# Patient Record
Sex: FEMALE | Race: WHITE | NOT HISPANIC OR LATINO | Employment: OTHER | URBAN - METROPOLITAN AREA
[De-identification: names, ages, dates, MRNs, and addresses within clinical notes are randomized per-mention and may not be internally consistent; named-entity substitution may affect disease eponyms.]

---

## 2017-01-17 ENCOUNTER — TELEPHONE (OUTPATIENT)
Dept: PEDIATRICS | Facility: CLINIC | Age: 47
End: 2017-01-17

## 2017-01-17 DIAGNOSIS — N39.46 MIXED INCONTINENCE URGE AND STRESS (MALE)(FEMALE): Primary | ICD-10-CM

## 2017-01-18 NOTE — TELEPHONE ENCOUNTER
Received notice from pharmacy that medication is not covered.     Medication: Tolterodine 4MG  Insurance phone # : [phone]  Insurance ID: [id number]    Elena Malone MA

## 2017-01-20 NOTE — TELEPHONE ENCOUNTER
PA sent via Cover My Meds for Tolterodine ER 4 mg qd. Will await response.       lv loco (Jensen: [id number])  Tolterodine Tartrate ER 4MG er capsules  Status: Sent to Plan  Created: January 20th, 2017  Sent: January 20th, 2017

## 2017-01-21 ENCOUNTER — MYC REFILL (OUTPATIENT)
Dept: PEDIATRICS | Facility: CLINIC | Age: 47
End: 2017-01-21

## 2017-01-21 ENCOUNTER — MYC MEDICAL ADVICE (OUTPATIENT)
Dept: PEDIATRICS | Facility: CLINIC | Age: 47
End: 2017-01-21

## 2017-01-21 DIAGNOSIS — G47.33 OSA (OBSTRUCTIVE SLEEP APNEA): ICD-10-CM

## 2017-01-23 NOTE — TELEPHONE ENCOUNTER
Message from [employer]:  Original authorizing provider: MD Leti Mack would like a refill of the following medications:  order for DME [Mary Ellen Wise MD]    Preferred pharmacy: [facility]    Comment:

## 2017-01-23 NOTE — TELEPHONE ENCOUNTER
Patient sent a separate message through [employer] Refill request for this DME. See other encounter.

## 2017-01-23 NOTE — TELEPHONE ENCOUNTER
Patient sent another [employer] message asking for CPAP supplies.     Faxed DME to [facility]. Updated patient.

## 2017-01-26 NOTE — TELEPHONE ENCOUNTER
Received denial as patient needs to try formulary alternatives; Oxybutynin, Oxybutynin ER, Trospium, or Over the counter generic oxytrol for women.     Routing to provider for review.     Elena Malone MA

## 2017-01-27 RX ORDER — OXYBUTYNIN CHLORIDE 10 MG/1
10 TABLET, EXTENDED RELEASE ORAL DAILY
Qty: 90 TABLET | Refills: 3 | Status: SHIPPED | OUTPATIENT
Start: 2017-01-27 | End: 2018-02-22

## 2017-01-27 RX ORDER — OXYBUTYNIN CHLORIDE 5 MG/1
TABLET, EXTENDED RELEASE ORAL
Qty: 70 TABLET | Refills: 0 | Status: CANCELLED | OUTPATIENT
Start: 2017-01-27

## 2017-01-27 NOTE — TELEPHONE ENCOUNTER
Patient states she is ok with trying Oxybutynin. She would like a dose comparable to a higher dose of Detrol LA 4 mg, to better manage her symptoms, was still having some incontinence with taking 4mg.     Routing to Dr. Wise to advise.     Ok to call back [phone], ok to leave detailed message on voicemail or with .

## 2017-02-03 ENCOUNTER — MYC MEDICAL ADVICE (OUTPATIENT)
Dept: PEDIATRICS | Facility: CLINIC | Age: 47
End: 2017-02-03

## 2017-02-03 DIAGNOSIS — L98.9 NON-HEALING SKIN LESION OF NOSE: Primary | ICD-10-CM

## 2017-03-09 ENCOUNTER — OFFICE VISIT (OUTPATIENT)
Dept: PEDIATRICS | Facility: CLINIC | Age: 47
End: 2017-03-09
Payer: COMMERCIAL

## 2017-03-09 VITALS
HEART RATE: 69 BPM | HEIGHT: 62 IN | OXYGEN SATURATION: 98 % | BODY MASS INDEX: 28.89 KG/M2 | WEIGHT: 157 LBS | TEMPERATURE: 97.5 F | SYSTOLIC BLOOD PRESSURE: 106 MMHG | DIASTOLIC BLOOD PRESSURE: 76 MMHG

## 2017-03-09 DIAGNOSIS — M79.641 BILATERAL HAND PAIN: ICD-10-CM

## 2017-03-09 DIAGNOSIS — F33.1 MAJOR DEPRESSIVE DISORDER, RECURRENT EPISODE, MODERATE (H): Primary | ICD-10-CM

## 2017-03-09 DIAGNOSIS — M79.642 BILATERAL HAND PAIN: ICD-10-CM

## 2017-03-09 PROCEDURE — 99214 OFFICE O/P EST MOD 30 MIN: CPT | Performed by: PEDIATRICS

## 2017-03-09 RX ORDER — FLUOXETINE 10 MG/1
30 CAPSULE ORAL DAILY
Qty: 270 CAPSULE | Refills: 3 | Status: SHIPPED | OUTPATIENT
Start: 2017-03-09 | End: 2017-10-24

## 2017-03-09 ASSESSMENT — ANXIETY QUESTIONNAIRES
IF YOU CHECKED OFF ANY PROBLEMS ON THIS QUESTIONNAIRE, HOW DIFFICULT HAVE THESE PROBLEMS MADE IT FOR YOU TO DO YOUR WORK, TAKE CARE OF THINGS AT HOME, OR GET ALONG WITH OTHER PEOPLE: SOMEWHAT DIFFICULT
GAD7 TOTAL SCORE: 2
6. BECOMING EASILY ANNOYED OR IRRITABLE: SEVERAL DAYS
5. BEING SO RESTLESS THAT IT IS HARD TO SIT STILL: SEVERAL DAYS
2. NOT BEING ABLE TO STOP OR CONTROL WORRYING: NOT AT ALL
7. FEELING AFRAID AS IF SOMETHING AWFUL MIGHT HAPPEN: NOT AT ALL
3. WORRYING TOO MUCH ABOUT DIFFERENT THINGS: NOT AT ALL
1. FEELING NERVOUS, ANXIOUS, OR ON EDGE: NOT AT ALL

## 2017-03-09 ASSESSMENT — PATIENT HEALTH QUESTIONNAIRE - PHQ9: 5. POOR APPETITE OR OVEREATING: NOT AT ALL

## 2017-03-09 NOTE — MR AVS SNAPSHOT
After Visit Summary   3/9/2017    Leti Grover    MRN: [id number]           Patient Information     Date Of Birth          1970        Visit Information        Provider Department      3/9/2017 9:00 AM Mary Ellen Wise MD [facility]        Today's Diagnoses     Major depressive disorder, recurrent episode, moderate (H)    -  1      Care Instructions    Continue to troubleshoot things that you see as problems    Stay active    Trial of increased dose of prozac - go to 30mg daily.  Send me a message with how this is going in about 4 weeks - sooner with problems        Follow-ups after your visit        Your next 10 appointments already scheduled     Mar 21, 2017 11:15 AM CDT   New Visit with Mary Ellen Moyer MD   [facility] ([facility]    [address]  [address]  [place] [facility] [place] [address] [phone]              Who to contact     If you have questions or need follow up information about today's clinic visit or your schedule please contact [facility] directly at [phone].  Normal or non-critical lab and imaging results will be communicated to you by MyChart, letter or phone within 4 business days after the clinic has received the results. If you do not hear from us within 7 days, please contact the clinic through [employer] or phone. If you have a critical or abnormal lab result, we will notify you by phone as soon as possible.  Submit refill requests through [employer] or call your pharmacy and they will forward the refill request to us. Please allow 3 business days for your refill to be completed.          Additional Information About Your Visit        MyChart Information     [employer] gives you secure access to your electronic health record. If you see a primary care provider, you can also send messages to your care team and make appointments. If you have questions, please call your primary care clinic.  If you do not have  "a primary care provider, please call [phone] and they will assist you.        Care EveryWhere ID     This is your Care EveryWhere ID. This could be used by other organizations to access your [facility] medical records  [id number]        Your Vitals Were     Pulse Temperature Height Pulse Oximetry BMI (Body Mass Index)       69 97.5  F (36.4  C) (Tympanic) 5' 2\" (1.575 m) 98% 28.72 kg/m2        Blood Pressure from Last 3 Encounters:   03/09/17 106/76   09/12/16 110/70   03/28/16 120/78    Weight from Last 3 Encounters:   03/09/17 157 lb (71.2 kg)   09/12/16 154 lb 11.2 oz (70.2 kg)   03/28/16 154 lb (69.9 kg)              We Performed the Following     DEPRESSION ACTION PLAN (DAP)          Today's Medication Changes          These changes are accurate as of: 3/9/17  9:29 AM.  If you have any questions, ask your nurse or doctor.               These medicines have changed or have updated prescriptions.        Dose/Directions    FLUoxetine 10 MG capsule   Commonly known as:  PROzac   This may have changed:    - medication strength  - how much to take   Used for:  Major depressive disorder, recurrent episode, moderate (H)   Changed by:  Mary Ellen Wise MD        Dose:  30 mg   Take 3 capsules (30 mg) by mouth daily   Quantity:  270 capsule   Refills:  3            Where to get your medicines      These medications were sent to [facility] [id number] [place] [address] E AT [facility] [address]  [address] [place] [address] [address], [facility] [address]     Phone:  [phone]     FLUoxetine 10 MG capsule                Primary Care Provider Office Phone # Fax #    Mary Ellen Wise [phone]       [facility] [address] [place] [address]        Thank you!     Thank you for choosing [facility]  for your care. Our goal is always to provide you with excellent care. Hearing back from our patients is one way we can continue to improve our services. " Please take a few minutes to complete the written survey that you may receive in the mail after your visit with us. Thank you!             Your Updated Medication List - Protect others around you: Learn how to safely use, store and throw away your medicines at www.disposemymeds.org.          This list is accurate as of: 3/9/17  9:29 AM.  Always use your most recent med list.                   Brand Name Dispense Instructions for use    blood glucose calibration solution    no brand specified    1 each    Use to calibrate blood glucose monitor as directed.       blood glucose monitoring meter device kit     1 kit    Use to test blood sugar 1 times daily or as directed.       blood glucose monitoring test strip    no brand specified    100 each    Use to test blood sugars 1 times daily or as directed       CHOLEST OFF PO          FLUoxetine 10 MG capsule    PROzac    270 capsule    Take 3 capsules (30 mg) by mouth daily       loratadine 10 MG tablet    CLARITIN    90 tablet    Take 1 tablet (10 mg) by mouth daily       Multi-vitamin Tabs tablet     100 tablet    Take 1 tablet by mouth daily       order for DME     1 Package    Equipment being ordered: CPAP supplies. Length of Need: Lifetime       oxybutynin 10 MG 24 hr tablet    DITROPAN XL    90 tablet    Take 1 tablet (10 mg) by mouth daily       thin lancets    NO BRAND SPECIFIED    1 Box    Use to test blood sugar 1 times daily or as directed.

## 2017-03-09 NOTE — PATIENT INSTRUCTIONS
Continue to troubleshoot things that you see as problems    Stay active    Trial of increased dose of prozac - go to 30mg daily.  Send me a message with how this is going in about 4 weeks - sooner with problems

## 2017-03-09 NOTE — LETTER
My Depression Action Plan  Name: Leti Grover   Date of Birth 1970  Date: 3/9/2017    My doctor: Mary Ellen Wise   My clinic: [address]  [address]  [facility] [address] [phone]          GREEN    ZONE   Good Control    What it looks like:     Things are going generally well. You have normal up s and down s. You may even feel depressed from time to time, but bad moods usually last less than a day.   What you need to do:  1. Continue to care for yourself (see self care plan)  2. Check your depression survival kit and update it as needed  3. Follow your physician s recommendations including any medication.  4. Do not stop taking medication unless you consult with your physician first.           YELLOW         ZONE Getting Worse    What it looks like:     Depression is starting to interfere with your life.     It may be hard to get out of bed; you may be starting to isolate yourself from others.    Symptoms of depression are starting to last most all day and this has happened for several days.     You may have suicidal thoughts but they are not constant.   What you need to do:     1. Call your care team, your response to treatment will improve if you keep your care team informed of your progress. Yellow periods are signs an adjustment may need to be made.     2. Continue your self-care, even if you have to fake it!    3. Talk to someone in your support network    4. Open up your depression survival kit           RED    ZONE Medical Alert - Get Help    What it looks like:     Depression is seriously interfering with your life.     You may experience these or other symptoms: You can t get out of bed most days, can t work or engage in other necessary activities, you have trouble taking care of basic hygiene, or basic responsibilities, thoughts of suicide or death that will not go away, self-injurious behavior.     What you need to do:  1. Call  your care team and request a same-day appointment. If they are not available (weekends or after hours) call your local crisis line, emergency room or 911.      Electronically signed by: Elena Malone, March 9, 2017    Depression Self Care Plan / Survival Kit    Self-Care for Depression  Here s the deal. Your body and mind are really not as separate as most people think.  What you do and think affects how you feel and how you feel influences what you do and think. This means if you do things that people who feel good do, it will help you feel better.  Sometimes this is all it takes.  There is also a place for medication and therapy depending on how severe your depression is, so be sure to consult with your medical provider and/ or Behavioral Health Consultant if your symptoms are worsening or not improving.     In order to better manage my stress, I will:    Exercise  Get some form of exercise, every day. This will help reduce pain and release endorphins, the  feel good  chemicals in your brain. This is almost as good as taking antidepressants!  This is not the same as joining a gym and then never going! (they count on that by the way ) It can be as simple as just going for a walk or doing some gardening, anything that will get you moving.      Hygiene   Maintain good hygiene (Get out of bed in the morning, Make your bed, Brush your teeth, Take a shower, and Get dressed like you were going to work, even if you are unemployed).  If your clothes don't fit try to get ones that do.    Diet  I will strive to eat foods that are good for me, drink plenty of water, and avoid excessive sugar, caffeine, alcohol, and other mood-altering substances.  Some foods that are helpful in depression are: complex carbohydrates, B vitamins, flaxseed, fish or fish oil, fresh fruits and vegetables.    Psychotherapy  I agree to participate in Individual Therapy (if recommended).    Medication  If prescribed medications, I agree  to take them.  Missing doses can result in serious side effects.  I understand that drinking alcohol, or other illicit drug use, may cause potential side effects.  I will not stop my medication abruptly without first discussing it with my provider.    Staying Connected With Others  I will stay in touch with my friends, family members, and my primary care provider/team.    Use your imagination  Be creative.  We all have a creative side; it doesn t matter if it s oil painting, sand castles, or mud pies! This will also kick up the endorphins.    Witness Beauty  (AKA stop and smell the roses) Take a look outside, even in mid-winter. Notice colors, textures. Watch the squirrels and birds.     Service to others  Be of service to others.  There is always someone else in need.  By helping others we can  get out of ourselves  and remember the really important things.  This also provides opportunities for practicing all the other parts of the program.    Humor  Laugh and be silly!  Adjust your TV habits for less news and crime-drama and more comedy.    Control your stress  Try breathing deep, massage therapy, biofeedback, and meditation. Find time to relax each day.     My support system    Clinic Contact:  Phone number:    Contact 1:  Phone number:    Contact 2:  Phone number:    Amish/:  Phone number:    Therapist:  Phone number:    Local crisis center:    Phone number:    Other community support:  Phone number:

## 2017-03-09 NOTE — NURSING NOTE
"Chief Complaint   Patient presents with     Depression     Recheck Medication       Initial /76 (BP Location: Right arm, Patient Position: Chair, Cuff Size: Adult Regular)  Pulse 69  Temp 97.5  F (36.4  C) (Tympanic)  Ht 5' 2\" (1.575 m)  Wt 157 lb (71.2 kg)  SpO2 98%  BMI 28.72 kg/m2 Estimated body mass index is 28.72 kg/(m^2) as calculated from the following:    Height as of this encounter: 5' 2\" (1.575 m).    Weight as of this encounter: 157 lb (71.2 kg).  Medication Reconciliation: complete  "

## 2017-03-09 NOTE — PROGRESS NOTES
"  SUBJECTIVE:                                                    Leti Grover is a 46 year old female who presents to clinic today for the following health issues:      Depression Followup    Status since last visit: Stable     See PHQ-9 for current symptoms.  Other associated symptoms: None    Complicating factors:   Significant life event:  No   Current substance abuse:  None  Anxiety or Panic symptoms:  No    PHQ-9  English PHQ-9   Any Language            Amount of exercise or physical activity: 1-2 times a week     Problems taking medications regularly: No    Medication side effects: none  Diet: regular (no restrictions)    Mood stable.  Biggest concern now is overeating.  Focusing on scripture reading and devotional on food. [employer] is a huge support to her - also in women's bible study group.   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    Has been feeling ok on the prozac, but has some down days and thinks she may benefit from a dose increase.  No thoughts of self harm.  No new anxiety concerns.      Has been having some increased hand aching.  No swelling or redness.  No increased warmth or stiffness.    Otherwise, has been feeling well with no recent infections.    Problem list and histories reviewed & adjusted, as indicated.  Additional history: as documented    Reviewed and updated as needed this visit by clinical staff  Tobacco  Allergies  Meds  Med Hx  Surg Hx  Fam Hx  Soc Hx      Reviewed and updated as needed this visit by Provider           OBJECTIVE:                                                    /76 (BP Location: Right arm, Patient Position: Chair, Cuff Size: Adult Regular)  Pulse 69  Temp 97.5  F (36.4  C) (Tympanic)  Ht 5' 2\" (1.575 m)  Wt 157 lb (71.2 kg)  SpO2 98%  BMI 28.72 kg/m2  Body mass index is 28.72 kg/(m^2).  GENERAL: healthy, alert and no distress  MS: hands with full range of motion, no swelling or " warmth  PSYCH: mentation appears normal, affect normal/bright       ASSESSMENT/PLAN:                                                        ICD-10-CM    1. Major depressive disorder, recurrent episode, moderate (H) F33.1 FLUoxetine (PROZAC) 10 MG capsule    Plan to increase dose of prozac slightly to 30mg daily in effort to boost mood.  Patient to contact me with how she is doing via [employer] in 4 weeks.     2. Bilateral hand pain M79.641 Discussed today - reviewed signs/symptoms for which to watch and reasons to return.  Suspect start of osteoarthritis symptoms as the cause of her current complaints.    M79.642        See Patient Instructions    Mary Ellen Wise MD  [facility]

## 2017-03-10 ASSESSMENT — ANXIETY QUESTIONNAIRES: GAD7 TOTAL SCORE: 2

## 2017-03-10 ASSESSMENT — PATIENT HEALTH QUESTIONNAIRE - PHQ9: SUM OF ALL RESPONSES TO PHQ QUESTIONS 1-9: 5

## 2017-03-21 ENCOUNTER — OFFICE VISIT (OUTPATIENT)
Dept: DERMATOLOGY | Facility: CLINIC | Age: 47
End: 2017-03-21
Payer: COMMERCIAL

## 2017-03-21 DIAGNOSIS — L57.0 AK (ACTINIC KERATOSIS): Primary | ICD-10-CM

## 2017-03-21 DIAGNOSIS — B07.8 OTHER VIRAL WARTS: ICD-10-CM

## 2017-03-21 PROCEDURE — 99243 OFF/OP CNSLTJ NEW/EST LOW 30: CPT | Mod: 25 | Performed by: DERMATOLOGY

## 2017-03-21 PROCEDURE — 17110 DESTRUCTION B9 LES UP TO 14: CPT | Performed by: DERMATOLOGY

## 2017-03-21 RX ORDER — FLUOROURACIL 50 MG/G
CREAM TOPICAL
Qty: 40 G | Refills: 1 | Status: SHIPPED | OUTPATIENT
Start: 2017-03-21 | End: 2017-09-18

## 2017-03-21 NOTE — PATIENT INSTRUCTIONS
Pediatric Dermatology  [facility]  [address]  1st [place]  [address]  Phone: [phone]    Pediatric & Adult Dermatology  [facility]  [address]   2nd Floor  [facility] [address]  [phone]                  General information: Dr. Mary Ellen Moyer is a board-certified dermatologist with subspecialty certification in pediatric dermatology.     Scheduling and Nurse Triage: Dr. Moyer sees pediatric patients on Mondays in [place] and adult and pediatric patients on Tuesdays in [place]. The remainder of the week she practices at the [facility]. Please call the above phone numbers to schedule or to talk to a nurse.     -For scheduling at the [place] or [place] locations, or to talk to the triage nurse please call the above phone number at the clinic where you were seen.     -For medication refills, please call your pharmacy.           -Small amount of efudex to nose nightly for 3 weeks.   -Home wart treatment below  -return in 4-6 weeks        Over The Counter at Home Wart Instructions:    Please follow instructions closely and do not skip days of treatment.  1. Soak warts for 10 minutes in warm water (this can be while bathing or showering).   2. Pat area dry with a towel.   3. Gently remove any whitish dead skin from the surface of the warts. Stop if it becomes painful or starts to bleed.   a. Nail files or pumice stones can be used, but should not be reused on normal skin and should not be used with others.   4. Apply Dr. Isai vences, Compound W, DuoFilm, Wart-off or other 17% salicylic acid-containing product to cover each wart.  a. Do not apply to normal surrounding skin.  5. Cover warts with duct tape. Most patients choose to apply this at bedtime and leave overnight.   6. Repeat the steps daily if possible.     What is NORMAL?     When the tape is  removed, it may pull off dead layers of skin from the wart and surrounding normal skin.     A  whitish  color to the wart and surrounding normal skin is to be expected.      Stop treatment if skin becomes too irritated.     You should continue treatment until the warts are no longer present.

## 2017-03-21 NOTE — LETTER
3/21/2017      RE: Leti Grover  [address]  [facility] [address]       Dermatology Clinic Note    CC: warts    DPL:  1. Hand warts  2. AK nose    HPI:   Leti Grover is a 46 year old initial presenting for initial evaluation of warts. The patient is seen a the request of Mary Ellen Wise MD. Lesions have been present for >5-10 years.     Past treatments: Cryotherapy, salicyclic acid  Symptoms: irritation  Locations: L hand  Patient has had many treatments with cryotherapy by Dr. Greene. 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     Other Concerns: Scaling on the nose. Present for many months. No past hx of skin cancer. No past treatment. Has had sunburns to the area.     Patient Active Problem List   Diagnosis     Hyperlipidemia     Elevated blood pressure reading without diagnosis of hypertension     Moderate Major Depression - Jennifer Deactivated     YAKELIN (obstructive sleep apnea)     HYPERLIPIDEMIA LDL GOAL <160     ASCUS favoring dysplasia     Chronic rhinitis     Obesity     History of gestational diabetes       No Known Allergies      Current Outpatient Prescriptions   Medication     fluorouracil (EFUDEX) 5 % cream     Plant Sterols and Stanols (CHOLEST OFF PO)     FLUoxetine (PROZAC) 10 MG capsule     oxybutynin (DITROPAN XL) 10 MG 24 hr tablet     order for DME     blood glucose calibration (NO BRAND SPECIFIED) solution     blood glucose monitoring (NO BRAND SPECIFIED) test strip     blood glucose monitoring (TOM CONTOUR NEXT MONITOR) meter device kit     loratadine (CLARITIN) 10 MG tablet     thin (NO BRAND SPECIFIED) lancets     multivitamin, therapeutic with minerals (MULTI-VITAMIN) TABS     No current facility-administered medications for this visit.        Social HX:  . Lives in [place]      Family History: No other family members with skin infections.      ROS: Negative for fever, weight loss, change in appetite, bone  pain/swelling, headaches, vision or hearing problems, cough, rhinorrhea, nausea, vomiting, diarrhea, or mood changes.     PHYSICAL EXAMINATION:     VITAL SIGNS:  There were no vitals taken for this visit.  GENERAL:  Well appearing and well nourished, in no acute distress.     HEAD:  Normocephalic, atraumatic.   EYES:  Clear.  Conjunctivae normal.     NECK:  Supple.   RESPIRATORY:  Patient is breathing comfortably in room air.   CARDIOVASCULAR:  Well perfused in all extremities.  No peripheral edema.    ABDOMEN:  Nondistended.   EXTREMITIES:  No clubbing or cyanosis.  Nails normal.   SKIN:  Exam of the face and hands:  Exam was notable for:  --Verrucous hyperkeratotic plaques on the L dorsal 3rd finger and thumb approx 1 cm. Verrucous 4 mm papule at the base of the L thumb4 mm,  --Several gritty papules on the nasal tip and dorsum    Assessment and Plan:  1. 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 No past biopsy. The presence of multiple lesions argues against verrucous carcinoma, but biopsy should be considered if they do not respond to this line of therapy. Discussed a variety of options. Will used combined home tx with salicyclic acid nightly. Ln applied to a single wart on the L thumb base for 3 10 sec freeze cycles. A total of 0.2 ml of candida antigen was injected into a single plaque on the L dorsal 3rd finger    2. AKs on nose: Recommended nightly efudex for 3 weeks. Will recheck at next visit.     RTC in 4-6 weeks.     Thank you for involving me in this patient's care.     Mary Ellen Moyer MD  Pediatric Dermatology Staff    CC: Dr. Wise.

## 2017-03-21 NOTE — MR AVS SNAPSHOT
After Visit Summary   3/21/2017    Leti Grover    MRN: [id number]           Patient Information     Date Of Birth          1970        Visit Information        Provider Department      3/21/2017 11:15 AM Mary Ellen Moyer MD [facility]        Today's Diagnoses     AK (actinic keratosis)    -  1      Care Instructions                    Pediatric Dermatology  [facility]  [address]  1st [place]  [address]  Phone: [phone]    Pediatric & Adult Dermatology  [facility]  [address]  2nd Floor  [facility] [address]  [phone]                  General information: Dr. Mary Ellen Moyer is a board-certified dermatologist with subspecialty certification in pediatric dermatology.     Scheduling and Nurse Triage: Dr. Moyer sees pediatric patients on Mondays in [place] and adult and pediatric patients on Tuesdays in [place]. The remainder of the week she practices at the [facility]. Please call the above phone numbers to schedule or to talk to a nurse.     -For scheduling at the [place] or [place] locations, or to talk to the triage nurse please call the above phone number at the clinic where you were seen.     -For medication refills, please call your pharmacy.           -Small amount of efudex to nose nightly for 3 weeks.   -Home wart treatment below  -return in 4-6 weeks        Over The Counter at Home Wart Instructions:    Please follow instructions closely and do not skip days of treatment.  1. Soak warts for 10 minutes in warm water (this can be while bathing or showering).   2. Pat area dry with a towel.   3. Gently remove any whitish dead skin from the surface of the warts. Stop if it becomes painful or starts to bleed.   a. Nail files or pumice stones can be used, but should not be reused on normal skin and should not be used with  others.   4. Apply Dr. Isai vences, Compound W, DuoFilm, Wart-off or other 17% salicylic acid-containing product to cover each wart.  a. Do not apply to normal surrounding skin.  5. Cover warts with duct tape. Most patients choose to apply this at bedtime and leave overnight.   6. Repeat the steps daily if possible.     What is NORMAL?     When the tape is removed, it may pull off dead layers of skin from the wart and surrounding normal skin.     A  whitish  color to the wart and surrounding normal skin is to be expected.      Stop treatment if skin becomes too irritated.     You should continue treatment until the warts are no longer present.           Follow-ups after your visit        Who to contact     If you have questions or need follow up information about today's clinic visit or your schedule please contact [facility] directly at [phone].  Normal or non-critical lab and imaging results will be communicated to you by MyChart, letter or phone within 4 business days after the clinic has received the results. If you do not hear from us within 7 days, please contact the clinic through [employer]en[employer] or phone. If you have a critical or abnormal lab result, we will notify you by phone as soon as possible.  Submit refill requests through [employer]ios or call your pharmacy and they will forward the refill request to us. Please allow 3 business days for your refill to be completed.          Additional Information About Your Visit        MyChart Information     D[employer]ios gives you secure access to your electronic health record. If you see a primary care provider, you can also send messages to your care team and make appointments. If you have questions, please call your primary care clinic.  If you do not have a primary care provider, please call [phone] and they will assist you.        Care EveryWhere ID     This is your Care EveryWhere ID. This could be used by other organizations to access your [facility]  records  [id number]         Blood Pressure from Last 3 Encounters:   03/09/17 106/76   09/12/16 110/70   03/28/16 120/78    Weight from Last 3 Encounters:   03/09/17 157 lb (71.2 kg)   09/12/16 154 lb 11.2 oz (70.2 kg)   03/28/16 154 lb (69.9 kg)              Today, you had the following     No orders found for display         Today's Medication Changes          These changes are accurate as of: 3/21/17 11:51 AM.  If you have any questions, ask your nurse or doctor.               Start taking these medicines.        Dose/Directions    fluorouracil 5 % cream   Commonly known as:  EFUDEX   Used for:  AK (actinic keratosis)   Started by:  Mary Ellen Moyer MD        Small amount to nose nightly for 3 weeks   Quantity:  40 g   Refills:  1            Where to get your medicines      These medications were sent to [facility] [address] [place] [address] AT [facility] [address]  [address] [place] [address], [facility] [address]     Phone:  [phone]     fluorouracil 5 % cream                Primary Care Provider Office Phone # Fax #    Mary Ellen Wise [phone]       [address] [place] [address]        Thank you!     Thank you for choosing [facility]  for your care. Our goal is always to provide you with excellent care. Hearing back from our patients is one way we can continue to improve our services. Please take a few minutes to complete the written survey that you may receive in the mail after your visit with us. Thank you!             Your Updated Medication List - Protect others around you: Learn how to safely use, store and throw away your medicines at www.disposemymeds.org.          This list is accurate as of: 3/21/17 11:51 AM.  Always use your most recent med list.                   Brand Name Dispense Instructions for use    blood glucose calibration solution    no brand specified    1 each    Use to calibrate  blood glucose monitor as directed.       blood glucose monitoring meter device kit     1 kit    Use to test blood sugar 1 times daily or as directed.       blood glucose monitoring test strip    no brand specified    100 each    Use to test blood sugars 1 times daily or as directed       CHOLEST OFF PO          fluorouracil 5 % cream    EFUDEX    40 g    Small amount to nose nightly for 3 weeks       FLUoxetine 10 MG capsule    PROzac    270 capsule    Take 3 capsules (30 mg) by mouth daily       loratadine 10 MG tablet    CLARITIN    90 tablet    Take 1 tablet (10 mg) by mouth daily       Multi-vitamin Tabs tablet     100 tablet    Take 1 tablet by mouth daily       order for DME     1 Package    Equipment being ordered: CPAP supplies. Length of Need: Lifetime       oxybutynin 10 MG 24 hr tablet    DITROPAN XL    90 tablet    Take 1 tablet (10 mg) by mouth daily       thin lancets    NO BRAND SPECIFIED    1 Box    Use to test blood sugar 1 times daily or as directed.

## 2017-03-21 NOTE — NURSING NOTE
"Chief Complaint   Patient presents with     Consult     spots on nose and warts on left hand        Initial There were no vitals taken for this visit. Estimated body mass index is 28.72 kg/(m^2) as calculated from the following:    Height as of 3/9/17: 5' 2\" (1.575 m).    Weight as of 3/9/17: 157 lb (71.2 kg).  Medication Reconciliation: complete   Jessica Zabala MA      "

## 2017-03-21 NOTE — PROGRESS NOTES
Dermatology Clinic Note    CC: warts    DPL:  1. Hand warts  2. AK nose    HPI:   Leti Grover is a 46 year old initial presenting for initial evaluation of warts. The patient is seen a the request of Mary Ellen Wise MD. Lesions have been present for >5-10 years.     Past treatments: Cryotherapy, salicyclic acid  Symptoms: irritation  Locations: L hand  Patient has had many treatments with cryotherapy by Dr. Greene. 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     Other Concerns: Scaling on the nose. Present for many months. No past hx of skin cancer. No past treatment. Has had sunburns to the area.     Patient Active Problem List   Diagnosis     Hyperlipidemia     Elevated blood pressure reading without diagnosis of hypertension     Moderate Major Depression - Jennifer Deactivated     YAKELIN (obstructive sleep apnea)     HYPERLIPIDEMIA LDL GOAL <160     ASCUS favoring dysplasia     Chronic rhinitis     Obesity     History of gestational diabetes       No Known Allergies      Current Outpatient Prescriptions   Medication     fluorouracil (EFUDEX) 5 % cream     Plant Sterols and Stanols (CHOLEST OFF PO)     FLUoxetine (PROZAC) 10 MG capsule     oxybutynin (DITROPAN XL) 10 MG 24 hr tablet     order for DME     blood glucose calibration (NO BRAND SPECIFIED) solution     blood glucose monitoring (NO BRAND SPECIFIED) test strip     blood glucose monitoring (TOM CONTOUR NEXT MONITOR) meter device kit     loratadine (CLARITIN) 10 MG tablet     thin (NO BRAND SPECIFIED) lancets     multivitamin, therapeutic with minerals (MULTI-VITAMIN) TABS     No current facility-administered medications for this visit.        Social HX:  . Lives in [place]      Family History: No other family members with skin infections.      ROS: Negative for fever, weight loss, change in appetite, bone pain/swelling, headaches, vision or hearing problems, cough, rhinorrhea, nausea, vomiting, diarrhea,  or mood changes.     PHYSICAL EXAMINATION:     VITAL SIGNS:  There were no vitals taken for this visit.  GENERAL:  Well appearing and well nourished, in no acute distress.     HEAD:  Normocephalic, atraumatic.   EYES:  Clear.  Conjunctivae normal.     NECK:  Supple.   RESPIRATORY:  Patient is breathing comfortably in room air.   CARDIOVASCULAR:  Well perfused in all extremities.  No peripheral edema.    ABDOMEN:  Nondistended.   EXTREMITIES:  No clubbing or cyanosis.  Nails normal.   SKIN:  Exam of the face and hands:  Exam was notable for:  --Verrucous hyperkeratotic plaques on the L dorsal 3rd finger and thumb approx 1 cm. Verrucous 4 mm papule at the base of the L thumb4 mm,  --Several gritty papules on the nasal tip and dorsum    Assessment and Plan:  1. 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 No past biopsy. The presence of multiple lesions argues against verrucous carcinoma, but biopsy should be considered if they do not respond to this line of therapy. Discussed a variety of options. Will used combined home tx with salicyclic acid nightly. Ln applied to a single wart on the L thumb base for 3 10 sec freeze cycles. A total of 0.2 ml of candida antigen was injected into a single plaque on the L dorsal 3rd finger    2. AKs on nose: Recommended nightly efudex for 3 weeks. Will recheck at next visit.     RTC in 4-6 weeks.     Thank you for involving me in this patient's care.     Mary Ellen Moyer MD  Pediatric Dermatology Staff    CC: Dr. Wise.

## 2017-03-25 ENCOUNTER — MYC MEDICAL ADVICE (OUTPATIENT)
Dept: PEDIATRICS | Facility: CLINIC | Age: 47
End: 2017-03-25

## 2017-03-25 DIAGNOSIS — R73.03 PREDIABETES: ICD-10-CM

## 2017-03-28 RX ORDER — LANCETS
EACH MISCELLANEOUS
Qty: 1 BOX | Refills: 3 | Status: SHIPPED | OUTPATIENT
Start: 2017-03-28 | End: 2019-10-04

## 2017-03-28 NOTE — TELEPHONE ENCOUNTER
Patient asking for tests strips and lancets.    Prescription approved per [facility] Refill Protocol.  Traci James RN  Message handled by Nurse Triage.

## 2017-04-18 ENCOUNTER — OFFICE VISIT (OUTPATIENT)
Dept: DERMATOLOGY | Facility: CLINIC | Age: 47
End: 2017-04-18
Payer: COMMERCIAL

## 2017-04-18 VITALS — BODY MASS INDEX: 28.79 KG/M2 | WEIGHT: 157.41 LBS

## 2017-04-18 DIAGNOSIS — B07.8 OTHER VIRAL WARTS: ICD-10-CM

## 2017-04-18 DIAGNOSIS — L57.0 AK (ACTINIC KERATOSIS): Primary | ICD-10-CM

## 2017-04-18 PROCEDURE — 99213 OFFICE O/P EST LOW 20 MIN: CPT | Mod: 25 | Performed by: DERMATOLOGY

## 2017-04-18 PROCEDURE — 17110 DESTRUCTION B9 LES UP TO 14: CPT | Performed by: DERMATOLOGY

## 2017-04-18 NOTE — LETTER
4/18/2017      RE: Leti Grover  [address]  [facility] [address]       Dermatology Clinic Note    CC: warts    DPL:  1. Hand warts  2. AK nose- S/p efudex 3/17    HPI:   Leit Grover is a 46 year old initial presenting for re-evaluation of warts. Lesions have been present for >5-10 years. At last visit one month ago I advised use of home sal acid nightly. Injected candida and treated with liquid nitrogen. Warts slightly smaller.     Leti completed 3 weeks of efudex to nose which resulted in erythema and scaling.     Past treatments: Cryotherapy, salicyclic acid  Symptoms: irritation  Locations: L hand  Patient has had many treatments with cryotherapy by Dr. Greene. 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         Patient Active Problem List   Diagnosis     Hyperlipidemia     Elevated blood pressure reading without diagnosis of hypertension     Moderate Major Depression - Jennifer Deactivated     YAKELIN (obstructive sleep apnea)     HYPERLIPIDEMIA LDL GOAL <160     ASCUS favoring dysplasia     Chronic rhinitis     Obesity     History of gestational diabetes       No Known Allergies      Current Outpatient Prescriptions   Medication     blood glucose monitoring (NO BRAND SPECIFIED) test strip     thin (NO BRAND SPECIFIED) lancets     fluorouracil (EFUDEX) 5 % cream     Plant Sterols and Stanols (CHOLEST OFF PO)     FLUoxetine (PROZAC) 10 MG capsule     oxybutynin (DITROPAN XL) 10 MG 24 hr tablet     order for DME     blood glucose calibration (NO BRAND SPECIFIED) solution     blood glucose monitoring (TOM CONTOUR NEXT MONITOR) meter device kit     loratadine (CLARITIN) 10 MG tablet     multivitamin, therapeutic with minerals (MULTI-VITAMIN) TABS     No current facility-administered medications for this visit.        Social HX:  . Lives in [place]. 2 children.       Family History: No other family members with skin infections.      ROS: Feeling  well, no other skin concerns.     PHYSICAL EXAMINATION:     VITAL SIGNS:  Wt 157 lb 6.5 oz (71.4 kg)  BMI 28.79 kg/m2  GENERAL:  Well appearing and well nourished, in no acute distress.     HEAD:  Normocephalic, atraumatic.   EYES:  Clear.  Conjunctivae normal.     NECK:  Supple.   RESPIRATORY:  Patient is breathing comfortably in room air.   CARDIOVASCULAR:  Well perfused in all extremities.  No peripheral edema.    ABDOMEN:  Nondistended.   EXTREMITIES:  No clubbing or cyanosis.  Nails normal.   SKIN:  Exam of the face and hands:  Exam was notable for:  --Verrucous hyperkeratotic plaques on the L dorsal 3rd finger and thumb approx 1 cm. Verrucous 4 mm papule at the base of the L thumb4 mm,  --Nasal dorsum and tip with erythema and RBC crusting.     Assessment and Plan:  1. 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 No past biopsy.  Will used combined home tx with salicyclic acid nightly. Ln applied to a  wart on the L thumb base and L dorsal 3rd finger for 2 10 sec freeze cycles. A total of 0.2 ml of candida antigen was injected into a single plaque on the L dorsal 3rd finger    2. AKs on nose: Good response to efudex with erythema and peeling. Advised Vaseline BID until clear.     RTC in 4-6 weeks.     Thank you for involving me in this patient's care.     Mary Ellen Moyer MD  Pediatric Dermatology Staff    CC: Dr. Wise.

## 2017-04-18 NOTE — NURSING NOTE
"Chief Complaint   Patient presents with     RECHECK     follow up on warts have improved but still are on left thumb and middle ringer and AK's       Initial Wt 157 lb 6.5 oz (71.4 kg)  BMI 28.79 kg/m2 Estimated body mass index is 28.79 kg/(m^2) as calculated from the following:    Height as of 3/9/17: 5' 2\" (157.5 cm).    Weight as of this encounter: 157 lb 6.5 oz (71.4 kg).  Medication Reconciliation: complete   Jessica Zabala MA      "

## 2017-04-18 NOTE — MR AVS SNAPSHOT
After Visit Summary   4/18/2017    Leti Grover    MRN: [id number]           Patient Information     Date Of Birth          1970        Visit Information        Provider Department      4/18/2017 9:00 AM Mary Ellen Moyer MD [facility]        Today's Diagnoses     AK (actinic keratosis)    -  1    Other viral warts           Follow-ups after your visit        Your next 10 appointments already scheduled     May 23, 2017  9:15 AM CDT   Office Visit with Mary Ellen Moyer MD   [facility] ([facility])    [address] [place] [address]  [address]  [facility] [address]   [phone]           Bring a current list of meds and any records pertaining to this visit.  For Physicals, please bring immunization records and any forms needing to be filled out.  Please arrive 10 minutes early to complete paperwork.              Who to contact     If you have questions or need follow up information about today's clinic visit or your schedule please contact [facility] directly at [phone].  Normal or non-critical lab and imaging results will be communicated to you by Agendahart, letter or phone within 4 business days after the clinic has received the results. If you do not hear from us within 7 days, please contact the clinic through [employer] or phone. If you have a critical or abnormal lab result, we will notify you by phone as soon as possible.  Submit refill requests through [employer] or call your pharmacy and they will forward the refill request to us. Please allow 3 business days for your refill to be completed.          Additional Information About Your Visit        Agendahart Information     [employer] gives you secure access to your electronic health record. If you see a primary care provider, you can also send messages to your care team and make appointments. If you have questions, please call your primary care clinic.  If you do not have a primary care  provider, please call [phone] and they will assist you.        Care EveryWhere ID     This is your Care EveryWhere ID. This could be used by other organizations to access your [place] medical records  [id number]        Your Vitals Were     BMI (Body Mass Index)                   28.79 kg/m2            Blood Pressure from Last 3 Encounters:   03/09/17 106/76   09/12/16 110/70   03/28/16 120/78    Weight from Last 3 Encounters:   04/18/17 157 lb 6.5 oz (71.4 kg)   03/09/17 157 lb (71.2 kg)   09/12/16 154 lb 11.2 oz (70.2 kg)              We Performed the Following     DESTRUCT BENIGN LESION, UP TO 14        Primary Care Provider Office Phone # Fax #    Mary Ellen Wise [phone]       [facility] [address] [place] [address] [address] [place] [address]        Thank you!     Thank you for choosing [facility]  for your care. Our goal is always to provide you with excellent care. Hearing back from our patients is one way we can continue to improve our services. Please take a few minutes to complete the written survey that you may receive in the mail after your visit with us. Thank you!             Your Updated Medication List - Protect others around you: Learn how to safely use, store and throw away your medicines at www.disposemymeds.org.          This list is accurate as of: 4/18/17  9:15 AM.  Always use your most recent med list.                   Brand Name Dispense Instructions for use    blood glucose calibration solution    no brand specified    1 each    Use to calibrate blood glucose monitor as directed.       blood glucose monitoring meter device kit     1 kit    Use to test blood sugar 1 times daily or as directed.       blood glucose monitoring test strip    no brand specified    100 each    Use to test blood sugars 1 times daily or as directed       CHOLEST OFF PO          fluorouracil 5 % cream    EFUDEX    40 g    Small amount to nose nightly for 3 weeks        FLUoxetine 10 MG capsule    PROzac    270 capsule    Take 3 capsules (30 mg) by mouth daily       loratadine 10 MG tablet    CLARITIN    90 tablet    Take 1 tablet (10 mg) by mouth daily       Multi-vitamin Tabs tablet     100 tablet    Take 1 tablet by mouth daily       order for DME     1 Package    Equipment being ordered: CPAP supplies. Length of Need: Lifetime       oxybutynin 10 MG 24 hr tablet    DITROPAN XL    90 tablet    Take 1 tablet (10 mg) by mouth daily       thin lancets    NO BRAND SPECIFIED    1 Box    Use to test blood sugar 1 times daily or as directed.

## 2017-04-18 NOTE — PROGRESS NOTES
Dermatology Clinic Note    CC: warts    DPL:  1. Hand warts  2. 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 Lesions have been present for >5-10 years. At last visit one month ago I advised use of home sal acid nightly. Injected candida and treated with liquid nitrogen. Warts slightly smaller.     Leti completed 3 weeks of efudex to nose which resulted in erythema and scaling.     Past treatments: Cryotherapy, salicyclic acid  Symptoms: irritation  Locations: L hand  Patient has had many treatments with cryotherapy by Dr. Greene. 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         Patient Active Problem List   Diagnosis     Hyperlipidemia     Elevated blood pressure reading without diagnosis of hypertension     Moderate Major Depression - Jennifer Deactivated     YAKELIN (obstructive sleep apnea)     HYPERLIPIDEMIA LDL GOAL <160     ASCUS favoring dysplasia     Chronic rhinitis     Obesity     History of gestational diabetes       No Known Allergies      Current Outpatient Prescriptions   Medication     blood glucose monitoring (NO BRAND SPECIFIED) test strip     thin (NO BRAND SPECIFIED) lancets     fluorouracil (EFUDEX) 5 % cream     Plant Sterols and Stanols (CHOLEST OFF PO)     FLUoxetine (PROZAC) 10 MG capsule     oxybutynin (DITROPAN XL) 10 MG 24 hr tablet     order for DME     blood glucose calibration (NO BRAND SPECIFIED) solution     blood glucose monitoring (TOM CONTOUR NEXT MONITOR) meter device kit     loratadine (CLARITIN) 10 MG tablet     multivitamin, therapeutic with minerals (MULTI-VITAMIN) TABS     No current facility-administered medications for this visit.        Social HX:  . Lives in [place]. 2 children.       Family History: No other family members with skin infections.      ROS: Feeling well, no other skin concerns.     PHYSICAL EXAMINATION:     VITAL SIGNS:  Wt 157 lb 6.5 oz  (71.4 kg)  BMI 28.79 kg/m2  GENERAL:  Well appearing and well nourished, in no acute distress.     HEAD:  Normocephalic, atraumatic.   EYES:  Clear.  Conjunctivae normal.     NECK:  Supple.   RESPIRATORY:  Patient is breathing comfortably in room air.   CARDIOVASCULAR:  Well perfused in all extremities.  No peripheral edema.    ABDOMEN:  Nondistended.   EXTREMITIES:  No clubbing or cyanosis.  Nails normal.   SKIN:  Exam of the face and hands:  Exam was notable for:  --Verrucous hyperkeratotic plaques on the L dorsal 3rd finger and thumb approx 1 cm. Verrucous 4 mm papule at the base of the L thumb4 mm,  --Nasal dorsum and tip with erythema and RBC crusting.     Assessment and Plan:  1. 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 No past biopsy.  Will used combined home tx with salicyclic acid nightly. Ln applied to a  wart on the L thumb base and L dorsal 3rd finger for 2 10 sec freeze cycles. A total of 0.2 ml of candida antigen was injected into a single plaque on the L dorsal 3rd finger    2. AKs on nose: Good response to efudex with erythema and peeling. Advised Vaseline BID until clear.     RTC in 4-6 weeks.     Thank you for involving me in this patient's care.     Mary Ellen Moyer MD  Pediatric Dermatology Staff    CC: Dr. Wise.

## 2017-05-23 ENCOUNTER — OFFICE VISIT (OUTPATIENT)
Dept: DERMATOLOGY | Facility: CLINIC | Age: 47
End: 2017-05-23
Payer: COMMERCIAL

## 2017-05-23 DIAGNOSIS — B07.8 OTHER VIRAL WARTS: Primary | ICD-10-CM

## 2017-05-23 PROCEDURE — 11900 INJECT SKIN LESIONS </W 7: CPT | Performed by: DERMATOLOGY

## 2017-05-23 RX ORDER — CANDIDA ALBICANS 1000 [PNU]/ML
0.1 INJECTION, SOLUTION INTRADERMAL ONCE
Qty: 1 ML | Refills: 0 | OUTPATIENT
Start: 2017-05-23 | End: 2017-05-23

## 2017-05-23 NOTE — LETTER
5/23/2017      RE: Leti Grover  [address]  [facility] [address]       Dermatology Clinic Note    CC: warts    DPL:  1. Hand warts  2. 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 Lesions have been present for >5-10 years. At last visit one month ago I advised use of home sal acid nightly. Injected candida and treated with liquid nitrogen.  Patient uses home cryotherapy under duct tape, but no sa.       Past treatments: Cryotherapy, salicyclic acid  Symptoms: irritation  Locations: L hand  Patient has had many treatments with cryotherapy by Dr. Greene. 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         Patient Active Problem List   Diagnosis     Hyperlipidemia     Elevated blood pressure reading without diagnosis of hypertension     Moderate Major Depression - Jennifer Deactivated     YAKELIN (obstructive sleep apnea)     HYPERLIPIDEMIA LDL GOAL <160     ASCUS favoring dysplasia     Chronic rhinitis     Obesity     History of gestational diabetes       No Known Allergies      Current Outpatient Prescriptions   Medication     blood glucose monitoring (NO BRAND SPECIFIED) test strip     thin (NO BRAND SPECIFIED) lancets     fluorouracil (EFUDEX) 5 % cream     Plant Sterols and Stanols (CHOLEST OFF PO)     FLUoxetine (PROZAC) 10 MG capsule     oxybutynin (DITROPAN XL) 10 MG 24 hr tablet     order for DME     blood glucose calibration (NO BRAND SPECIFIED) solution     blood glucose monitoring (TOM CONTOUR NEXT MONITOR) meter device kit     loratadine (CLARITIN) 10 MG tablet     multivitamin, therapeutic with minerals (MULTI-VITAMIN) TABS     No current facility-administered medications for this visit.        Social HX:  . Lives in [place]. 2 children.       Family History: son with hand warts    ROS: Feeling well, no other skin concerns.     PHYSICAL EXAMINATION:     VITAL SIGNS:   There were no vitals taken for this visit.  GENERAL:  Well appearing and well nourished, in no acute distress.     HEAD:  Normocephalic, atraumatic.   EYES:  Clear.  Conjunctivae normal.     NECK:  Supple.   RESPIRATORY:  Patient is breathing comfortably in room air.   CARDIOVASCULAR:  Well perfused in all extremities.  No peripheral edema.    ABDOMEN:  Nondistended.   EXTREMITIES:  No clubbing or cyanosis.  Nails normal.   SKIN:  Exam of the face and hands:  Exam was notable for:  --Verrucous hyperkeratotic plaques on the L dorsal 3rd finger and thumb approx 1 cm. Verrucous 4 mm papule at the base of the L thumb4 mm,    Assessment and Plan:  1. 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 No past biopsy.  Will used combined home tx with salicyclic acid nightly. Ln applied to a  wart on the L thumb base, L dorsal thumb, and L dorsal 3rd finger for 2 10 sec freeze cycles. A total of 0.2 ml of candida antigen was injected into a single plaque on the L 1st finger. Advised home SA treatment under tape.       RTC in 4-6 weeks.     Thank you for involving me in this patient's care.     Mary Ellen Moyer MD  Pediatric Dermatology Staff    CC: Dr. Wise.

## 2017-05-23 NOTE — NURSING NOTE
"Chief Complaint   Patient presents with     RECHECK     viral warts slight improvement but still the same no new concerns        Initial There were no vitals taken for this visit. Estimated body mass index is 28.79 kg/(m^2) as calculated from the following:    Height as of 3/9/17: 5' 2\" (1.575 m).    Weight as of 4/18/17: 157 lb 6.5 oz (71.4 kg).  Medication Reconciliation: complete   Jessica Zabala MA      "

## 2017-05-23 NOTE — MR AVS SNAPSHOT
After Visit Summary   5/23/2017    Leti Grover    MRN: [id number]           Patient Information     Date Of Birth          1970        Visit Information        Provider Department      5/23/2017 9:15 AM Mary Ellen Moyer MD [facility]        Today's Diagnoses     Other viral warts    -  1       Follow-ups after your visit        Your next 10 appointments already scheduled     Jul 11, 2017  9:15 AM CDT   Return Visit with Mary Ellen Moyer MD   [facility] ([facility])    [address]  [address]  [facility] [address] [phone]              Who to contact     If you have questions or need follow up information about today's clinic visit or your schedule please contact [facility] directly at [phone].  Normal or non-critical lab and imaging results will be communicated to you by MyChart, letter or phone within 4 business days after the clinic has received the results. If you do not hear from us within 7 days, please contact the clinic through MyChart or phone. If you have a critical or abnormal lab result, we will notify you by phone as soon as possible.  Submit refill requests through [facility] or call your pharmacy and they will forward the refill request to us. Please allow 3 business days for your refill to be completed.          Additional Information About Your Visit        MyChart Information     [facility] gives you secure access to your electronic health record. If you see a primary care provider, you can also send messages to your care team and make appointments. If you have questions, please call your primary care clinic.  If you do not have a primary care provider, please call [phone] and they will assist you.        Care EveryWhere ID     This is your Care EveryWhere ID. This could be used by other organizations to access your [place] medical records  [id number]         Blood Pressure from Last 3  Encounters:   03/09/17 106/76   09/12/16 110/70   03/28/16 120/78    Weight from Last 3 Encounters:   04/18/17 157 lb 6.5 oz (71.4 kg)   03/09/17 157 lb (71.2 kg)   09/12/16 154 lb 11.2 oz (70.2 kg)              We Performed the Following     INJECTION INTO SKIN LESIONS <=7          Today's Medication Changes          These changes are accurate as of: 5/23/17 10:15 AM.  If you have any questions, ask your nurse or doctor.               Start taking these medicines.        Dose/Directions    candida albicans skin test injection   Used for:  Other viral warts   Started by:  Mary Ellen Moyer MD        Dose:  0.1 mL   Inject 0.1 mLs into the skin once for 1 dose   Quantity:  1 mL   Refills:  0            Where to get your medicines      Some of these will need a paper prescription and others can be bought over the counter.  Ask your nurse if you have questions.     You don't need a prescription for these medications     candida albicans skin test injection                Primary Care Provider Office Phone # Fax #    Mary Ellen Wise [phone]       [address] [place] [address]        Thank you!     Thank you for choosing [facility]  for your care. Our goal is always to provide you with excellent care. Hearing back from our patients is one way we can continue to improve our services. Please take a few minutes to complete the written survey that you may receive in the mail after your visit with us. Thank you!             Your Updated Medication List - Protect others around you: Learn how to safely use, store and throw away your medicines at www.disposemymeds.org.          This list is accurate as of: 5/23/17 10:15 AM.  Always use your most recent med list.                   Brand Name Dispense Instructions for use    blood glucose calibration solution    no brand specified    1 each    Use to calibrate blood glucose monitor as directed.        blood glucose monitoring meter device kit     1 kit    Use to test blood sugar 1 times daily or as directed.       blood glucose monitoring test strip    no brand specified    100 each    Use to test blood sugars 1 times daily or as directed       candida albicans skin test injection     1 mL    Inject 0.1 mLs into the skin once for 1 dose       CHOLEST OFF PO          fluorouracil 5 % cream    EFUDEX    40 g    Small amount to nose nightly for 3 weeks       FLUoxetine 10 MG capsule    PROzac    270 capsule    Take 3 capsules (30 mg) by mouth daily       loratadine 10 MG tablet    CLARITIN    90 tablet    Take 1 tablet (10 mg) by mouth daily       Multi-vitamin Tabs tablet     100 tablet    Take 1 tablet by mouth daily       order for DME     1 Package    Equipment being ordered: CPAP supplies. Length of Need: Lifetime       oxybutynin 10 MG 24 hr tablet    DITROPAN XL    90 tablet    Take 1 tablet (10 mg) by mouth daily       thin lancets    NO BRAND SPECIFIED    1 Box    Use to test blood sugar 1 times daily or as directed.

## 2017-05-23 NOTE — PROGRESS NOTES
Dermatology Clinic Note    CC: warts    DPL:  1. Hand warts  2. 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 Lesions have been present for >5-10 years. At last visit one month ago I advised use of home sal acid nightly. Injected candida and treated with liquid nitrogen.  Patient uses home cryotherapy under duct tape, but no sa.       Past treatments: Cryotherapy, salicyclic acid  Symptoms: irritation  Locations: L hand  Patient has had many treatments with cryotherapy by Dr. Greene. 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         Patient Active Problem List   Diagnosis     Hyperlipidemia     Elevated blood pressure reading without diagnosis of hypertension     Moderate Major Depression - Jennifer Deactivated     YAKELIN (obstructive sleep apnea)     HYPERLIPIDEMIA LDL GOAL <160     ASCUS favoring dysplasia     Chronic rhinitis     Obesity     History of gestational diabetes       No Known Allergies      Current Outpatient Prescriptions   Medication     blood glucose monitoring (NO BRAND SPECIFIED) test strip     thin (NO BRAND SPECIFIED) lancets     fluorouracil (EFUDEX) 5 % cream     Plant Sterols and Stanols (CHOLEST OFF PO)     FLUoxetine (PROZAC) 10 MG capsule     oxybutynin (DITROPAN XL) 10 MG 24 hr tablet     order for DME     blood glucose calibration (NO BRAND SPECIFIED) solution     blood glucose monitoring (TOM CONTOUR NEXT MONITOR) meter device kit     loratadine (CLARITIN) 10 MG tablet     multivitamin, therapeutic with minerals (MULTI-VITAMIN) TABS     No current facility-administered medications for this visit.        Social HX:  . Lives in [place]. 2 children.       Family History: son with hand warts    ROS: Feeling well, no other skin concerns.     PHYSICAL EXAMINATION:     VITAL SIGNS:  There were no vitals taken for this visit.  GENERAL:  Well appearing and well nourished, in no  acute distress.     HEAD:  Normocephalic, atraumatic.   EYES:  Clear.  Conjunctivae normal.     NECK:  Supple.   RESPIRATORY:  Patient is breathing comfortably in room air.   CARDIOVASCULAR:  Well perfused in all extremities.  No peripheral edema.    ABDOMEN:  Nondistended.   EXTREMITIES:  No clubbing or cyanosis.  Nails normal.   SKIN:  Exam of the face and hands:  Exam was notable for:  --Verrucous hyperkeratotic plaques on the L dorsal 3rd finger and thumb approx 1 cm. Verrucous 4 mm papule at the base of the L thumb4 mm,    Assessment and Plan:  1. 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 No past biopsy.  Will used combined home tx with salicyclic acid nightly. Ln applied to a  wart on the L thumb base, L dorsal thumb, and L dorsal 3rd finger for 2 10 sec freeze cycles. A total of 0.2 ml of candida antigen was injected into a single plaque on the L 1st finger. Advised home SA treatment under tape.       RTC in 4-6 weeks.     Thank you for involving me in this patient's care.     Mary Ellen Moyer MD  Pediatric Dermatology Staff    CC: Dr. Wise.

## 2017-05-30 DIAGNOSIS — R09.82 POST-NASAL DRIP: ICD-10-CM

## 2017-05-30 RX ORDER — LORATADINE 10 MG/1
10 TABLET ORAL DAILY
Qty: 90 TABLET | Refills: 3 | Status: SHIPPED | OUTPATIENT
Start: 2017-05-30 | End: 2018-05-15

## 2017-05-30 NOTE — TELEPHONE ENCOUNTER
LORATADINE 10MG      Last Written Prescription Date: 5/13/2016  Last Fill Quantity: 90,  # refills: 3   Last Office Visit with [facility], [facility] or [facility] prescribing provider: 3/9/2017                                         Next 5 appointments (look out 90 days)     Jul 11, 2017  9:15 AM CDT   Return Visit with Mary Ellen Moyer MD   [facility] ([facility])    [address] [place] [address]  [address] [place] [address] [phone]

## 2017-07-11 ENCOUNTER — OFFICE VISIT (OUTPATIENT)
Dept: DERMATOLOGY | Facility: CLINIC | Age: 47
End: 2017-07-11
Payer: COMMERCIAL

## 2017-07-11 DIAGNOSIS — B07.8 OTHER VIRAL WARTS: ICD-10-CM

## 2017-07-11 DIAGNOSIS — D48.5 NEOPLASM OF UNCERTAIN BEHAVIOR OF SKIN: Primary | ICD-10-CM

## 2017-07-11 PROCEDURE — 88305 TISSUE EXAM BY PATHOLOGIST: CPT | Performed by: DERMATOLOGY

## 2017-07-11 PROCEDURE — 11100 HC BIOPSY SKIN/SUBQ/MUC MEM, SINGLE LESION: CPT | Mod: 59 | Performed by: DERMATOLOGY

## 2017-07-11 PROCEDURE — 17110 DESTRUCTION B9 LES UP TO 14: CPT | Performed by: DERMATOLOGY

## 2017-07-11 RX ORDER — LIDOCAINE HYDROCHLORIDE AND EPINEPHRINE 10; 10 MG/ML; UG/ML
3 INJECTION, SOLUTION INFILTRATION; PERINEURAL ONCE
Qty: 3 ML | Refills: 0 | OUTPATIENT
Start: 2017-07-11 | End: 2017-07-11

## 2017-07-11 NOTE — MR AVS SNAPSHOT
After Visit Summary   7/11/2017    Leti Grover    MRN: [id number]           Patient Information     Date Of Birth          1970        Visit Information        Provider Department      7/11/2017 9:15 AM Mary Ellen Moyer MD [facility]        Today's Diagnoses     Neoplasm of uncertain behavior of skin    -  1      Care Instructions                    Pediatric Dermatology  [facility] [address] [facility]  [address]  1st [place]  [address]  Phone: [phone]    Pediatric & Adult Dermatology  [facility]  [address]   2nd Floor  [facility] [address]  [phone]                  General information: Dr. Mary Ellen Moyer is a board-certified dermatologist with subspecialty certification in pediatric dermatology.     Scheduling and Nurse Triage: Dr. Moyer sees pediatric patients on Mondays in [place] and adult and pediatric patients on Tuesdays in [place]. The remainder of the week she practices at the [facility]. Please call the above phone numbers to schedule or to talk to a nurse.     -For scheduling at the [place] or [place] locations, or to talk to the triage nurse please call the above phone number at the clinic where you were seen.     -For medication refills, please call your pharmacy.   WOUND CARE: Wound Care After a Biopsy    How should I care for my wound for the first 24 hours?    If it bleeds, hold direct pressure on the area for 10 minutes    Acetaminophen (TylenolTM) as needed for pain    How should I care for the wound after 24 hours?    Remove the bandage     You may bathe or shower as normal    How do I clean my wound?    Use white petroleum/Vaseline  to keep sutures moist twice daily.     Replace the Bandaid /bandage to keep the wound covered until it is completely healed (not needed in the scalp)    What should I use to clean my wound?      White petroleum jelly (Vaseline )     Bandaids   as needed    How should I care for my wound after a shave biopsy?    Keep up with wound care for one week or until the area is healed     How should I care for my wound after a punch biopsy?    Complete wound care until the stitches are removed     Stitches need to be removed in 14 days. You may return to our clinic for this or you may have it done locally at your doctor s office.    When should I call my doctor?    If you have increased:   o Pain or swelling    o Pus or drainage  o Temperature over 101? F (38.3 ? C)   Redness or warmth  around wound              Follow-ups after your visit        Who to contact     If you have questions or need follow up information about today's clinic visit or your schedule please contact [facility] directly at [phone].  Normal or non-critical lab and imaging results will be communicated to you by [email or web address], letter or phone within 4 business days after the clinic has received the results. If you do not hear from us within 7 days, please contact the clinic through [email or web address] or phone. If you have a critical or abnormal lab result, we will notify you by phone as soon as possible.  Submit refill requests through [employer] or call your pharmacy and they will forward the refill request to us. Please allow 3 business days for your refill to be completed.          Additional Information About Your Visit        [employer] Information     [employer] gives you secure access to your electronic health record. If you see a primary care provider, you can also send messages to your care team and make appointments. If you have questions, please call your primary care clinic.  If you do not have a primary care provider, please call [phone] and they will assist you.        Care EveryWhere ID     This is your Care EveryWhere ID. This could be used by other organizations to access your [place] medical records  [id number]         Blood  Pressure from Last 3 Encounters:   03/09/17 106/76   09/12/16 110/70   03/28/16 120/78    Weight from Last 3 Encounters:   04/18/17 157 lb 6.5 oz (71.4 kg)   03/09/17 157 lb (71.2 kg)   09/12/16 154 lb 11.2 oz (70.2 kg)              We Performed the Following     Surgical pathology exam        Primary Care Provider Office Phone # Fax #    Mary Ellen Wise [phone]       [facility] [address] [place] [address]        Equal Access to Services     [facility]: Hadii aad ku hadasho Soomaali, waaxda luqadaha, qaybta kaalmada adeegyada, gabriela yu hayclinton stein . So [facility] [phone].    ATENCIÓN: Si habla español, tiene a lin disposición servicios gratuitos de asistencia lingüística. Llame al [phone].    We comply with applicable federal civil rights laws and [place] laws. We do not discriminate on the basis of race, color, national origin, age, disability sex, sexual orientation or gender identity.            Thank you!     Thank you for choosing [facility]  for your care. Our goal is always to provide you with excellent care. Hearing back from our patients is one way we can continue to improve our services. Please take a few minutes to complete the written survey that you may receive in the mail after your visit with us. Thank you!             Your Updated Medication List - Protect others around you: Learn how to safely use, store and throw away your medicines at www.disposemymeds.org.          This list is accurate as of: 7/11/17 10:09 AM.  Always use your most recent med list.                   Brand Name Dispense Instructions for use Diagnosis    blood glucose calibration solution    no brand specified    1 each    Use to calibrate blood glucose monitor as directed.    Prediabetes       blood glucose monitoring meter device kit     1 kit    Use to test blood sugar 1 times daily or as directed.    Prediabetes       blood glucose  monitoring test strip    no brand specified    100 each    Use to test blood sugars 1 times daily or as directed    Prediabetes       CHOLEST OFF PO           COMPOUND W EX           fluorouracil 5 % cream    EFUDEX    40 g    Small amount to nose nightly for 3 weeks    AK (actinic keratosis)       FLUoxetine 10 MG capsule    PROzac    270 capsule    Take 3 capsules (30 mg) by mouth daily    Major depressive disorder, recurrent episode, moderate (H)       loratadine 10 MG tablet    CLARITIN    90 tablet    Take 1 tablet (10 mg) by mouth daily    Post-nasal drip       Multi-vitamin Tabs tablet     100 tablet    Take 1 tablet by mouth daily        order for DME     1 Package    Equipment being ordered: CPAP supplies. Length of Need: Lifetime    YAKELIN (obstructive sleep apnea)       oxybutynin 10 MG 24 hr tablet    DITROPAN XL    90 tablet    Take 1 tablet (10 mg) by mouth daily    Mixed incontinence urge and stress (male)(female)       thin lancets    NO BRAND SPECIFIED    1 Box    Use to test blood sugar 1 times daily or as directed.    Prediabetes

## 2017-07-11 NOTE — LETTER
7/11/2017      RE: Leti Grover  [address]  [facility] [address]       Dermatology Clinic Note    CC: warts    DPL:  1. Hand warts  2. 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 Lesions have been present for >5-10 years. At last visit one month ago I advised use of home sal acid nightly. Injected candida and treated with liquid nitrogenx3 .  Warts persist and may be increased in size. Salicylic acid causing irritation.         Patient Active Problem List   Diagnosis     Hyperlipidemia     Elevated blood pressure reading without diagnosis of hypertension     Moderate Major Depression - Jennifer Deactivated     AYKELIN (obstructive sleep apnea)     HYPERLIPIDEMIA LDL GOAL <160     ASCUS favoring dysplasia     Chronic rhinitis     Obesity     History of gestational diabetes       No Known Allergies      Current Outpatient Prescriptions   Medication     Salicylic Acid (COMPOUND W EX)     loratadine (CLARITIN) 10 MG tablet     blood glucose monitoring (NO BRAND SPECIFIED) test strip     thin (NO BRAND SPECIFIED) lancets     fluorouracil (EFUDEX) 5 % cream     Plant Sterols and Stanols (CHOLEST OFF PO)     FLUoxetine (PROZAC) 10 MG capsule     oxybutynin (DITROPAN XL) 10 MG 24 hr tablet     order for DME     blood glucose calibration (NO BRAND SPECIFIED) solution     blood glucose monitoring (TOM CONTOUR NEXT MONITOR) meter device kit     multivitamin, therapeutic with minerals (MULTI-VITAMIN) TABS     No current facility-administered medications for this visit.        Social HX:  . Lives in [place]. 2 children.       Family History: son with hand warts    ROS: Feeling well, no other skin concerns.     PHYSICAL EXAMINATION:     VITAL SIGNS:  There were no vitals taken for this visit.  GENERAL:  Well appearing and well nourished, in no acute distress.     HEAD:  Normocephalic, atraumatic.   EYES:  Clear.  Conjunctivae  normal.     NECK:  Supple.   RESPIRATORY:  Patient is breathing comfortably in room air.   CARDIOVASCULAR:  Well perfused in all extremities.  No peripheral edema.    ABDOMEN:  Nondistended.   EXTREMITIES:  No clubbing or cyanosis.  Nails normal.   SKIN:  Exam of the face and hands:  Exam was notable for:  --Verrucous hyperkeratotic plaques on the L dorsal 3rd finger and thumb approx 1 cm. Verrucous 4 mm papule at the base of the L thumb4 mm. Erythema surrounding all lesions.     Shave biopsy procedure note:    After discussion of benefits and risks including but not limited to bleeding, infection, scar, incomplete removal, recurrence, and non-diagnostic biopsy, written consent and photographs were obtained. The area was cleaned with isopropyl alcohol. 1mL of 1% lidocaine with epinephrine was injected to obtain adequate anesthesia of the lesion on the L 3rd finger. A shave biopsy was performed. Hemostasis was achieved with aluminium chloride. Vaseline and a sterile dressing were applied. The patient tolerated the procedure and no complications were noted. The patient was provided with verbal and written post care instructions.       Assessment and Plan:  1. 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 Biopsy performed on the 3rd finger due to refractory nature to ensure no signs of  Verrucous carcinoma. All lesions treated with liquid nitrogen x3 cycles.       RTC in 4-6 weeks.     Thank you for involving me in this patient's care.     Mary Ellen Moyer MD  Pediatric Dermatology Staff    CC: Dr. Wise.

## 2017-07-11 NOTE — NURSING NOTE
"Chief Complaint   Patient presents with     RECHECK     follow up on warts on left hand on middle and thumb finger, middle finger is worse and thumb is the same, peeling and white tx: compound w gel       Initial There were no vitals taken for this visit. Estimated body mass index is 28.79 kg/(m^2) as calculated from the following:    Height as of 3/9/17: 5' 2\" (157.5 cm).    Weight as of 4/18/17: 157 lb 6.5 oz (71.4 kg).  Medication Reconciliation: complete   Jessica Zabala MA      "

## 2017-07-11 NOTE — PATIENT INSTRUCTIONS
Pediatric Dermatology  [facility]  [address]  1st [place]  [address]  Phone: [phone]    Pediatric & Adult Dermatology  [facility]  [email or web address]   2nd [address]  [facility] [address]  [phone]                  General information: Dr. Mary Ellen Moyer is a board-certified dermatologist with subspecialty certification in pediatric dermatology.     Scheduling and Nurse Triage: Dr. Moyer sees pediatric patients on Mondays in [place] and adult and pediatric patients on Tuesdays in [place]. The remainder of the week she practices at the [facility]. Please call the above phone numbers to schedule or to talk to a nurse.     -For scheduling at the [place] or [place] locations, or to talk to the triage nurse please call the above phone number at the clinic where you were seen.     -For medication refills, please call your pharmacy.   WOUND CARE: Wound Care After a Biopsy    How should I care for my wound for the first 24 hours?    If it bleeds, hold direct pressure on the area for 10 minutes    Acetaminophen (TylenolTM) as needed for pain    How should I care for the wound after 24 hours?    Remove the bandage     You may bathe or shower as normal    How do I clean my wound?    Use white petroleum/Vaseline  to keep sutures moist twice daily.     Replace the Bandaid /bandage to keep the wound covered until it is completely healed (not needed in the scalp)    What should I use to clean my wound?     White petroleum jelly (Vaseline )     Bandaids   as needed    How should I care for my wound after a shave biopsy?    Keep up with wound care for one week or until the area is healed     How should I care for my wound after a punch biopsy?    Complete wound care until the stitches are removed     Stitches need to be removed in 14 days. You may return to our clinic for  this or you may have it done locally at your doctor s office.    When should I call my doctor?    If you have increased:   o Pain or swelling    o Pus or drainage  o Temperature over 101? F (38.3 ? C)   Redness or warmth  around wound

## 2017-07-11 NOTE — PROGRESS NOTES
Dermatology Clinic Note    CC: warts    DPL:  1. Hand warts  2. 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 Lesions have been present for >5-10 years. At last visit one month ago I advised use of home sal acid nightly. Injected candida and treated with liquid nitrogenx3 .  Warts persist and may be increased in size. Salicylic acid causing irritation.         Patient Active Problem List   Diagnosis     Hyperlipidemia     Elevated blood pressure reading without diagnosis of hypertension     Moderate Major Depression - Jennifer Deactivated     YAKELIN (obstructive sleep apnea)     HYPERLIPIDEMIA LDL GOAL <160     ASCUS favoring dysplasia     Chronic rhinitis     Obesity     History of gestational diabetes       No Known Allergies      Current Outpatient Prescriptions   Medication     Salicylic Acid (COMPOUND W EX)     loratadine (CLARITIN) 10 MG tablet     blood glucose monitoring (NO BRAND SPECIFIED) test strip     thin (NO BRAND SPECIFIED) lancets     fluorouracil (EFUDEX) 5 % cream     Plant Sterols and Stanols (CHOLEST OFF PO)     FLUoxetine (PROZAC) 10 MG capsule     oxybutynin (DITROPAN XL) 10 MG 24 hr tablet     order for DME     blood glucose calibration (NO BRAND SPECIFIED) solution     blood glucose monitoring (TOM CONTOUR NEXT MONITOR) meter device kit     multivitamin, therapeutic with minerals (MULTI-VITAMIN) TABS     No current facility-administered medications for this visit.        Social HX:  . Lives in [place]. 2 children.       Family History: son with hand warts    ROS: Feeling well, no other skin concerns.     PHYSICAL EXAMINATION:     VITAL SIGNS:  There were no vitals taken for this visit.  GENERAL:  Well appearing and well nourished, in no acute distress.     HEAD:  Normocephalic, atraumatic.   EYES:  Clear.  Conjunctivae normal.     NECK:  Supple.   RESPIRATORY:  Patient is breathing comfortably in room air.    CARDIOVASCULAR:  Well perfused in all extremities.  No peripheral edema.    ABDOMEN:  Nondistended.   EXTREMITIES:  No clubbing or cyanosis.  Nails normal.   SKIN:  Exam of the face and hands:  Exam was notable for:  --Verrucous hyperkeratotic plaques on the L dorsal 3rd finger and thumb approx 1 cm. Verrucous 4 mm papule at the base of the L thumb4 mm. Erythema surrounding all lesions.     Shave biopsy procedure note:    After discussion of benefits and risks including but not limited to bleeding, infection, scar, incomplete removal, recurrence, and non-diagnostic biopsy, written consent and photographs were obtained. The area was cleaned with isopropyl alcohol. 1mL of 1% lidocaine with epinephrine was injected to obtain adequate anesthesia of the lesion on the L 3rd finger. A shave biopsy was performed. Hemostasis was achieved with aluminium chloride. Vaseline and a sterile dressing were applied. The patient tolerated the procedure and no complications were noted. The patient was provided with verbal and written post care instructions.       Assessment and Plan:  1. 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 Biopsy performed on the 3rd finger due to refractory nature to ensure no signs of  Verrucous carcinoma. All lesions treated with liquid nitrogen x3 cycles.       RTC in 4-6 weeks.     Thank you for involving me in this patient's care.     Mary Ellen Moyer MD  Pediatric Dermatology Staff    CC: Dr. Wise.

## 2017-07-14 LAB — COPATH REPORT: NORMAL

## 2017-08-29 ENCOUNTER — OFFICE VISIT (OUTPATIENT)
Dept: DERMATOLOGY | Facility: CLINIC | Age: 47
End: 2017-08-29
Payer: COMMERCIAL

## 2017-08-29 DIAGNOSIS — B07.8 OTHER VIRAL WARTS: Primary | ICD-10-CM

## 2017-08-29 PROCEDURE — 17110 DESTRUCTION B9 LES UP TO 14: CPT | Performed by: DERMATOLOGY

## 2017-08-29 NOTE — PROGRESS NOTES
Dermatology Clinic Note    CC: warts    DPL:  1. Hand warts x 5-10 yr. S/p biopsy, candida x3, home sa, starting home 5FU, cryotherapy monthly  2. 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 Lesions have been present for >5-10 years. At last visit one month ago biopsy performed that showed wart. Ran out of sal acid which she repurchased today. No improvement in warts.         Patient Active Problem List   Diagnosis     Hyperlipidemia     Elevated blood pressure reading without diagnosis of hypertension     Moderate Major Depression - Jennifer Deactivated     YAKELIN (obstructive sleep apnea)     HYPERLIPIDEMIA LDL GOAL <160     ASCUS favoring dysplasia     Chronic rhinitis     Obesity     History of gestational diabetes       No Known Allergies      Current Outpatient Prescriptions   Medication     Salicylic Acid (COMPOUND W EX)     loratadine (CLARITIN) 10 MG tablet     blood glucose monitoring (NO BRAND SPECIFIED) test strip     thin (NO BRAND SPECIFIED) lancets     fluorouracil (EFUDEX) 5 % cream     Plant Sterols and Stanols (CHOLEST OFF PO)     FLUoxetine (PROZAC) 10 MG capsule     oxybutynin (DITROPAN XL) 10 MG 24 hr tablet     order for DME     blood glucose calibration (NO BRAND SPECIFIED) solution     blood glucose monitoring (TOM CONTOUR NEXT MONITOR) meter device kit     multivitamin, therapeutic with minerals (MULTI-VITAMIN) TABS     No current facility-administered medications for this visit.        Social HX:  . Lives in [place]. 2 children.       Family History: son with hand warts    ROS: Feeling well, no other skin concerns.     PHYSICAL EXAMINATION:     VITAL SIGNS:  There were no vitals taken for this visit.  GENERAL:  Well appearing and well nourished, in no acute distress.     HEAD:  Normocephalic, atraumatic.   EYES:  Clear.  Conjunctivae normal.     NECK:  Supple.   RESPIRATORY:  Patient is breathing comfortably in  room air.   CARDIOVASCULAR:  Well perfused in all extremities.  No peripheral edema.    ABDOMEN:  Nondistended.   EXTREMITIES:  No clubbing or cyanosis.  Nails normal.   SKIN:  Exam of the face and hands:  Exam was notable for:  --Verrucous hyperkeratotic plaques on the L dorsal 3rd finger and thumb approx 1 cm.     Assessment and Plan:  1. 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 After paring warts on the L 1st and 3rd fingers were treated with liquid nitrogen x3 cycles. Advised used of Sal acid alternating with 5FU nightly under occlusion.       RTC in 4-6 weeks.     Thank you for involving me in this patient's care.     Mary Ellen Moyer MD  Pediatric Dermatology Staff    CC: Dr. Wise.

## 2017-08-29 NOTE — LETTER
8/29/2017      RE: Leti Grover  [address]  [facility] [address]       Dermatology Clinic Note    CC: warts    DPL:  1. Hand warts x 5-10 yr. S/p biopsy, candida x3, home sa, starting home 5FU, cryotherapy monthly  2. 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 Lesions have been present for >5-10 years. At last visit one month ago biopsy performed that showed wart. Ran out of sal acid which she repurchased today. No improvement in warts.         Patient Active Problem List   Diagnosis     Hyperlipidemia     Elevated blood pressure reading without diagnosis of hypertension     Moderate Major Depression - Jennifer Deactivated     YAKELIN (obstructive sleep apnea)     HYPERLIPIDEMIA LDL GOAL <160     ASCUS favoring dysplasia     Chronic rhinitis     Obesity     History of gestational diabetes       No Known Allergies      Current Outpatient Prescriptions   Medication     Salicylic Acid (COMPOUND W EX)     loratadine (CLARITIN) 10 MG tablet     blood glucose monitoring (NO BRAND SPECIFIED) test strip     thin (NO BRAND SPECIFIED) lancets     fluorouracil (EFUDEX) 5 % cream     Plant Sterols and Stanols (CHOLEST OFF PO)     FLUoxetine (PROZAC) 10 MG capsule     oxybutynin (DITROPAN XL) 10 MG 24 hr tablet     order for DME     blood glucose calibration (NO BRAND SPECIFIED) solution     blood glucose monitoring (TOM CONTOUR NEXT MONITOR) meter device kit     multivitamin, therapeutic with minerals (MULTI-VITAMIN) TABS     No current facility-administered medications for this visit.        Social HX:  . Lives in [place]. 2 children.       Family History: son with hand warts    ROS: Feeling well, no other skin concerns.     PHYSICAL EXAMINATION:     VITAL SIGNS:  There were no vitals taken for this visit.  GENERAL:  Well appearing and well nourished, in no acute distress.     HEAD:  Normocephalic, atraumatic.   EYES:  Clear.   Conjunctivae normal.     NECK:  Supple.   RESPIRATORY:  Patient is breathing comfortably in room air.   CARDIOVASCULAR:  Well perfused in all extremities.  No peripheral edema.    ABDOMEN:  Nondistended.   EXTREMITIES:  No clubbing or cyanosis.  Nails normal.   SKIN:  Exam of the face and hands:  Exam was notable for:  --Verrucous hyperkeratotic plaques on the L dorsal 3rd finger and thumb approx 1 cm.     Assessment and Plan:  1. 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 After paring warts on the L 1st and 3rd fingers were treated with liquid nitrogen x3 cycles. Advised used of Sal acid alternating with 5FU nightly under occlusion.       RTC in 4-6 weeks.     Thank you for involving me in this patient's care.     Mary Ellen Moyer MD  Pediatric Dermatology Staff    CC: Dr. Wise.

## 2017-08-29 NOTE — MR AVS SNAPSHOT
After Visit Summary   8/29/2017    Leti Grover    MRN: [id number]           Patient Information     Date Of Birth          1970        Visit Information        Provider Department      8/29/2017 9:00 AM Mary Ellen Moyer MD [facility]        Today's Diagnoses     Other viral warts    -  1       Follow-ups after your visit        Your next 10 appointments already scheduled     Sep 18, 2017  8:20 AM CDT   MyChart Long with Mary Ellen Wise MD   [facility]    [address] [place] [address]  [address]  [place] [address] [phone]            Sep 26, 2017  1:30 PM CDT   Return Visit with Mary Ellen Moyer MD   [facility] ([facility]    [address] [place] [address]  [address]  [facility] [address] [phone]              Who to contact     If you have questions or need follow up information about today's clinic visit or your schedule please contact [facility] directly at [phone].  Normal or non-critical lab and imaging results will be communicated to you by MyChart, letter or phone within 4 business days after the clinic has received the results. If you do not hear from us within 7 days, please contact the clinic through [employer] or phone. If you have a critical or abnormal lab result, we will notify you by phone as soon as possible.  Submit refill requests through [employer] or call your pharmacy and they will forward the refill request to us. Please allow 3 business days for your refill to be completed.          Additional Information About Your Visit        MyChart Information     [employer] gives you secure access to your electronic health record. If you see a primary care provider, you can also send messages to your care team and make appointments. If you have questions, please call your primary care clinic.  If you do not have a primary care provider, please call [phone] and they  will assist you.        Care EveryWhere ID     This is your Care EveryWhere ID. This could be used by other organizations to access your [place] medical records  [id number]         Blood Pressure from Last 3 Encounters:   03/09/17 106/76   09/12/16 110/70   03/28/16 120/78    Weight from Last 3 Encounters:   04/18/17 157 lb 6.5 oz (71.4 kg)   03/09/17 157 lb (71.2 kg)   09/12/16 154 lb 11.2 oz (70.2 kg)              We Performed the Following     DESTRUCT BENIGN LESION, UP TO 14        Primary Care Provider Office Phone # Fax #    Mary Ellen Wise [phone] [address] [place] [address]        Equal Access to Services     [facility]: Hadii aad ku hadasho Soomaali, waaxda luqadaha, qaybta kaalmada adeegyada, waxjohnny whipplein hayclinton stein . So [facility] [phone].    ATENCIÓN: Si habla español, tiene a lin disposición servicios gratuitos de asistencia lingüística. Llame al [phone].    We comply with applicable federal civil rights laws and [place] laws. We do not discriminate on the basis of race, color, national origin, age, disability sex, sexual orientation or gender identity.            Thank you!     Thank you for choosing [facility]  for your care. Our goal is always to provide you with excellent care. Hearing back from our patients is one way we can continue to improve our services. Please take a few minutes to complete the written survey that you may receive in the mail after your visit with us. Thank you!             Your Updated Medication List - Protect others around you: Learn how to safely use, store and throw away your medicines at www.disposemymeds.org.          This list is accurate as of: 8/29/17  9:26 AM.  Always use your most recent med list.                   Brand Name Dispense Instructions for use Diagnosis    blood glucose calibration solution    no brand specified    1 each    Use to calibrate blood glucose monitor as  directed.    Prediabetes       blood glucose monitoring meter device kit     1 kit    Use to test blood sugar 1 times daily or as directed.    Prediabetes       blood glucose monitoring test strip    no brand specified    100 each    Use to test blood sugars 1 times daily or as directed    Prediabetes       CHOLEST OFF PO           COMPOUND W EX           fluorouracil 5 % cream    EFUDEX    40 g    Small amount to nose nightly for 3 weeks    AK (actinic keratosis)       FLUoxetine 10 MG capsule    PROzac    270 capsule    Take 3 capsules (30 mg) by mouth daily    Major depressive disorder, recurrent episode, moderate (H)       loratadine 10 MG tablet    CLARITIN    90 tablet    Take 1 tablet (10 mg) by mouth daily    Post-nasal drip       Multi-vitamin Tabs tablet     100 tablet    Take 1 tablet by mouth daily        order for DME     1 Package    Equipment being ordered: CPAP supplies. Length of Need: Lifetime    YAKELIN (obstructive sleep apnea)       oxybutynin 10 MG 24 hr tablet    DITROPAN XL    90 tablet    Take 1 tablet (10 mg) by mouth daily    Mixed incontinence urge and stress (male)(female)       thin lancets    NO BRAND SPECIFIED    1 Box    Use to test blood sugar 1 times daily or as directed.    Prediabetes

## 2017-08-29 NOTE — NURSING NOTE
"Chief Complaint   Patient presents with     RECHECK     return for warts, left hand and middle finger still there with slight improvement not spreading or growing in size        Initial There were no vitals taken for this visit. Estimated body mass index is 28.79 kg/(m^2) as calculated from the following:    Height as of 3/9/17: 5' 2\" (157.5 cm).    Weight as of 4/18/17: 157 lb 6.5 oz (71.4 kg).  Medication Reconciliation: complete   Jessica Zabala MA      "

## 2017-09-13 ENCOUNTER — TELEPHONE (OUTPATIENT)
Dept: PEDIATRICS | Facility: CLINIC | Age: 47
End: 2017-09-13

## 2017-09-13 NOTE — TELEPHONE ENCOUNTER
PA was requested for Fluoxetine 10mg Capsules; called to initiate PA and was informed there has been a paid claim through the pharmacy.     Called pharmacy and they confirmed I do not need to complete a PA.   Closing encounter.     Olya Fields CMA ([facility])

## 2017-09-18 ENCOUNTER — OFFICE VISIT (OUTPATIENT)
Dept: PEDIATRICS | Facility: CLINIC | Age: 47
End: 2017-09-18
Payer: COMMERCIAL

## 2017-09-18 VITALS
WEIGHT: 151.7 LBS | OXYGEN SATURATION: 96 % | TEMPERATURE: 97.9 F | HEART RATE: 64 BPM | SYSTOLIC BLOOD PRESSURE: 120 MMHG | HEIGHT: 62 IN | BODY MASS INDEX: 27.92 KG/M2 | DIASTOLIC BLOOD PRESSURE: 78 MMHG

## 2017-09-18 DIAGNOSIS — F33.1 MODERATE RECURRENT MAJOR DEPRESSION (H): Primary | ICD-10-CM

## 2017-09-18 DIAGNOSIS — J06.9 VIRAL URI WITH COUGH: ICD-10-CM

## 2017-09-18 DIAGNOSIS — R76.0 ANTINUCLEAR ANTIBODY (ANA) TITER GREATER THAN 1:80: ICD-10-CM

## 2017-09-18 DIAGNOSIS — M13.0 POLYARTHROPATHY: ICD-10-CM

## 2017-09-18 DIAGNOSIS — R73.01 IMPAIRED FASTING GLUCOSE: ICD-10-CM

## 2017-09-18 DIAGNOSIS — Z23 NEED FOR PROPHYLACTIC VACCINATION AND INOCULATION AGAINST INFLUENZA: ICD-10-CM

## 2017-09-18 LAB
ANION GAP SERPL CALCULATED.3IONS-SCNC: 11 MMOL/L (ref 3–14)
BUN SERPL-MCNC: 11 MG/DL (ref 7–30)
CALCIUM SERPL-MCNC: 9 MG/DL (ref 8.5–10.1)
CHLORIDE SERPL-SCNC: 103 MMOL/L (ref 94–109)
CO2 SERPL-SCNC: 24 MMOL/L (ref 20–32)
CREAT SERPL-MCNC: 0.82 MG/DL (ref 0.52–1.04)
ERYTHROCYTE [SEDIMENTATION RATE] IN BLOOD BY WESTERGREN METHOD: 16 MM/H (ref 0–20)
GFR SERPL CREATININE-BSD FRML MDRD: 74 ML/MIN/1.7M2
GLUCOSE SERPL-MCNC: 84 MG/DL (ref 70–99)
HBA1C MFR BLD: 6.3 % (ref 4.3–6)
POTASSIUM SERPL-SCNC: 3.8 MMOL/L (ref 3.4–5.3)
SODIUM SERPL-SCNC: 138 MMOL/L (ref 133–144)

## 2017-09-18 PROCEDURE — 86039 ANTINUCLEAR ANTIBODIES (ANA): CPT | Performed by: PEDIATRICS

## 2017-09-18 PROCEDURE — 90471 IMMUNIZATION ADMIN: CPT | Performed by: PEDIATRICS

## 2017-09-18 PROCEDURE — 80048 BASIC METABOLIC PNL TOTAL CA: CPT | Performed by: PEDIATRICS

## 2017-09-18 PROCEDURE — 90686 IIV4 VACC NO PRSV 0.5 ML IM: CPT | Performed by: PEDIATRICS

## 2017-09-18 PROCEDURE — 86038 ANTINUCLEAR ANTIBODIES: CPT | Performed by: PEDIATRICS

## 2017-09-18 PROCEDURE — 83036 HEMOGLOBIN GLYCOSYLATED A1C: CPT | Performed by: PEDIATRICS

## 2017-09-18 PROCEDURE — 85652 RBC SED RATE AUTOMATED: CPT | Performed by: PEDIATRICS

## 2017-09-18 PROCEDURE — 86200 CCP ANTIBODY: CPT | Performed by: PEDIATRICS

## 2017-09-18 PROCEDURE — 86431 RHEUMATOID FACTOR QUANT: CPT | Performed by: PEDIATRICS

## 2017-09-18 PROCEDURE — 36415 COLL VENOUS BLD VENIPUNCTURE: CPT | Performed by: PEDIATRICS

## 2017-09-18 PROCEDURE — 99214 OFFICE O/P EST MOD 30 MIN: CPT | Mod: 25 | Performed by: PEDIATRICS

## 2017-09-18 ASSESSMENT — ANXIETY QUESTIONNAIRES
6. BECOMING EASILY ANNOYED OR IRRITABLE: SEVERAL DAYS
IF YOU CHECKED OFF ANY PROBLEMS ON THIS QUESTIONNAIRE, HOW DIFFICULT HAVE THESE PROBLEMS MADE IT FOR YOU TO DO YOUR WORK, TAKE CARE OF THINGS AT HOME, OR GET ALONG WITH OTHER PEOPLE: SOMEWHAT DIFFICULT
2. NOT BEING ABLE TO STOP OR CONTROL WORRYING: NOT AT ALL
1. FEELING NERVOUS, ANXIOUS, OR ON EDGE: NOT AT ALL
GAD7 TOTAL SCORE: 2
7. FEELING AFRAID AS IF SOMETHING AWFUL MIGHT HAPPEN: NOT AT ALL
5. BEING SO RESTLESS THAT IT IS HARD TO SIT STILL: NOT AT ALL
3. WORRYING TOO MUCH ABOUT DIFFERENT THINGS: NOT AT ALL

## 2017-09-18 ASSESSMENT — PATIENT HEALTH QUESTIONNAIRE - PHQ9
SUM OF ALL RESPONSES TO PHQ QUESTIONS 1-9: 5
5. POOR APPETITE OR OVEREATING: SEVERAL DAYS

## 2017-09-18 NOTE — PROGRESS NOTES
SUBJECTIVE:   Leti Grover is a 47 year old female who presents to clinic today for the following health issues:      Depression Followup    Status since last visit: Stable     See PHQ-9 for current symptoms.  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    Depression symptoms have been relatively well controlled - no thoughts of self harm.    No side effects.      Allergies/cold - nose stuffed up for about one week. Having a little bit of sinus pain/pressure.  No ear pain.  Little bit of cough.  No improvement over last several days.  Using allergy meds.  Can't tolerate any nasal sprays.        Musculoskeletal problem/pain      Duration: ongoing for the last few months     Description  Location: knees, wrist, ankles     Intensity:  moderate    Accompanying signs and symptoms: left side, aching feeling     History  Previous similar problem: no   Previous evaluation:  none    Precipitating or alleviating factors:  Trauma or overuse: no   Aggravating factors include: none    Therapies tried and outcome: massage and stretching      Joints have become more sore - mostly on the left side.  They are achy for the last few months.  Symptoms wax and wane.  Is helpful to stretch feet.  No joint redness or swelling.   Gets stiff in the morning - unclear amount of time.   Joints bothered - small joints in fingers, feet, and ankles bilaterally.  No known family hx of arthritis.  Feels like losing a little bit of hand strength.          Problem list and histories reviewed & adjusted, as indicated.  Additional history: as documented      Reviewed and updated as needed this visit by clinical staffTobacco  " Allergies  Med Hx  Surg Hx  Fam Hx  Soc Hx      Reviewed and updated as needed this visit by Provider         ROS: negative for other CV, HEENT, pulm, psych, joint, neuro complaints apart from those above    OBJECTIVE:     /78 (BP Location: Right arm, Patient Position: Right side, Cuff Size: Adult Regular)  Pulse 64  Temp 97.9  F (36.6  C) (Tympanic)  Ht 5' 2\" (1.575 m)  Wt 151 lb 11.2 oz (68.8 kg)  SpO2 96%  BMI 27.75 kg/m2  Body mass index is 27.75 kg/(m^2).  GENERAL: alert and no distress  EYES: Eyes grossly normal to inspection, PERRL and conjunctivae and sclerae normal  HENT: normal cephalic/atraumatic, ear canals and TM's normal, nasal mucosa edematous , oropharynx clear, oral mucous membranes moist and sinuses: not tender  NECK: no adenopathy, no asymmetry, masses, or scars and thyroid normal to palpation  RESP: lungs clear to auscultation - no rales, rhonchi or wheezes  CV: regular rate and rhythm, normal S1 S2, no S3 or S4, no murmur, click or rub, no peripheral edema and peripheral pulses strong  MS: no gross musculoskeletal defects noted, no edema, no synovitis, full range of motion in hands, ankles, feet without any swelling or bruising observed  SKIN: no suspicious lesions or rashes  PSYCH: mentation appears normal, affect normal/bright    Diagnostic Test Results:  pending    ASSESSMENT/PLAN:         ICD-10-CM    1. Moderate recurrent major depression (H) F33.1 Well controlled, continue current medications     2. Impaired fasting glucose R73.01 HEMOGLOBIN A1C  Congratulated on weight loss - recheck labs today   3. 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        4. " Viral URI with cough J06.9 To alert me if not improving or worsening over next week for antibiotic consideration - 2 boys in school - multiple sick contacts    B97.89    5. Need for prophylactic vaccination and inoculation against influenza Z23 HC FLU VAC PRESRV FREE QUAD SPLIT VIR 3+YRS IM     Vaccine Administration, Initial [[id number]]       See Patient Instructions    Mary Ellen Wise MD  [facility]

## 2017-09-18 NOTE — PROGRESS NOTES
Injectable Influenza Immunization Documentation    1.  Is the person to be vaccinated sick today? no    2. Does the person to be vaccinated have an allergy to a component   of the vaccine? no    3. Has the person to be vaccinated ever had a serious reaction   to influenza vaccine in the past? no    4. Has the person to be vaccinated ever had Guillain-Barré syndrome? no    Form completed by patient

## 2017-09-18 NOTE — MR AVS SNAPSHOT
After Visit Summary   9/18/2017    Leti Grover    MRN: [id number]           Patient Information     Date Of Birth          1970        Visit Information        Provider Department      9/18/2017 8:20 AM Mary Ellen Wise MD [facility]        Today's Diagnoses     Moderate recurrent major depression (H)    -  1    Impaired fasting glucose        Polyarthropathy        Viral URI with cough          Care Instructions    Continue current dose of fluoxetine    Alert me if your cold/cough do not improve over the next week and we will start you on augmentin for a sinus infection    Stop for labs on your way out today -we will add arthritis labs.   Try using over the counter topical capsaicin creams for your joint pain.    Flu shot today          Follow-ups after your visit        Your next 10 appointments already scheduled     Sep 26, 2017  1:30 PM CDT   Return Visit with Mary Ellen Moyer MD   [facility] ([facility])    [address] [place] [address]  [address]  [facility] [address] [phone]              Who to contact     If you have questions or need follow up information about today's clinic visit or your schedule please contact [facility] directly at [phone].  Normal or non-critical lab and imaging results will be communicated to you by MyChart, letter or phone within 4 business days after the clinic has received the results. If you do not hear from us within 7 days, please contact the clinic through [employer] or phone. If you have a critical or abnormal lab result, we will notify you by phone as soon as possible.  Submit refill requests through [employer] or call your pharmacy and they will forward the refill request to us. Please allow 3 business days for your refill to be completed.          Additional Information About Your Visit        MyChart Information     [employer] gives you secure access to your electronic health record. If  "you see a primary care provider, you can also send messages to your care team and make appointments. If you have questions, please call your primary care clinic.  If you do not have a primary care provider, please call [phone] and they will assist you.        Care EveryWhere ID     This is your Care EveryWhere ID. This could be used by other organizations to access your [facility] medical records  [id number]        Your Vitals Were     Pulse Temperature Height Pulse Oximetry BMI (Body Mass Index)       64 97.9  F (36.6  C) (Tympanic) 5' 2\" (1.575 m) 96% 27.75 kg/m2        Blood Pressure from Last 3 Encounters:   09/18/17 120/78   03/09/17 106/76   09/12/16 110/70    Weight from Last 3 Encounters:   09/18/17 151 lb 11.2 oz (68.8 kg)   04/18/17 157 lb 6.5 oz (71.4 kg)   03/09/17 157 lb (71.2 kg)              We Performed the Following     ANNETTE Scrn Rflx to Titer and Ptrn ([facility])     Basic metabolic panel     Cyclic Citrullinated Peptide Antibody IgG     Erythrocyte sedimentation rate auto     HEMOGLOBIN A1C     Rheumatoid factor        Primary Care Provider Office Phone # Fax #    Mary Ellen Wise [phone]       [facility] [place] [address]        Equal Access to Services     LEDA RAMIREZ AH: Hadii aad ku hadasho Soomaali, waaxda luqadaha, qaybta kaalmada adeegyada, waxay idiin hayaan sandra khyordan la'ericn . So [facility] [phone].    ATENCIÓN: Si habla español, tiene a lin disposición servicios gratuitos de asistencia lingüística. Llame al [phone].    We comply with applicable federal civil rights laws and [place] laws. We do not discriminate on the basis of race, color, national origin, age, disability sex, sexual orientation or gender identity.            Thank you!     Thank you for choosing [facility]  for your care. Our goal is always to provide you with excellent care. Hearing back from our patients is one way we can continue to improve our services. " Please take a few minutes to complete the written survey that you may receive in the mail after your visit with us. Thank you!             Your Updated Medication List - Protect others around you: Learn how to safely use, store and throw away your medicines at www.disposemymeds.org.          This list is accurate as of: 9/18/17  8:55 AM.  Always use your most recent med list.                   Brand Name Dispense Instructions for use Diagnosis    blood glucose calibration solution    no brand specified    1 each    Use to calibrate blood glucose monitor as directed.    Prediabetes       blood glucose monitoring meter device kit     1 kit    Use to test blood sugar 1 times daily or as directed.    Prediabetes       blood glucose monitoring test strip    no brand specified    100 each    Use to test blood sugars 1 times daily or as directed    Prediabetes       CHOLEST OFF PO           COMPOUND W EX           FLUoxetine 10 MG capsule    PROzac    270 capsule    Take 3 capsules (30 mg) by mouth daily    Major depressive disorder, recurrent episode, moderate (H)       loratadine 10 MG tablet    CLARITIN    90 tablet    Take 1 tablet (10 mg) by mouth daily    Post-nasal drip       Multi-vitamin Tabs tablet     100 tablet    Take 1 tablet by mouth daily        order for DME     1 Package    Equipment being ordered: CPAP supplies. Length of Need: Lifetime    YAKELIN (obstructive sleep apnea)       oxybutynin 10 MG 24 hr tablet    DITROPAN XL    90 tablet    Take 1 tablet (10 mg) by mouth daily    Mixed incontinence urge and stress (male)(female)       thin lancets    NO BRAND SPECIFIED    1 Box    Use to test blood sugar 1 times daily or as directed.    Prediabetes

## 2017-09-18 NOTE — PATIENT INSTRUCTIONS
Continue current dose of fluoxetine    Alert me if your cold/cough do not improve over the next week and we will start you on augmentin for a sinus infection    Stop for labs on your way out today -we will add arthritis labs.   Try using over the counter topical capsaicin creams for your joint pain.    Flu shot today

## 2017-09-18 NOTE — NURSING NOTE
"Chief Complaint   Patient presents with     Depression     Recheck Medication       Initial /78 (BP Location: Right arm, Patient Position: Right side, Cuff Size: Adult Regular)  Pulse 64  Temp 97.9  F (36.6  C) (Tympanic)  Ht 5' 2\" (1.575 m)  Wt 151 lb 11.2 oz (68.8 kg)  SpO2 96%  BMI 27.75 kg/m2 Estimated body mass index is 27.75 kg/(m^2) as calculated from the following:    Height as of this encounter: 5' 2\" (1.575 m).    Weight as of this encounter: 151 lb 11.2 oz (68.8 kg).  Medication Reconciliation: complete  "

## 2017-09-19 LAB
CCP AB SER IA-ACNC: 1 U/ML
RHEUMATOID FACT SER NEPH-ACNC: <20 IU/ML (ref 0–20)

## 2017-09-19 ASSESSMENT — ANXIETY QUESTIONNAIRES: GAD7 TOTAL SCORE: 2

## 2017-09-20 LAB
ANA PAT SER IF-IMP: ABNORMAL
ANA SER QL IF: POSITIVE
ANA TITR SER IF: ABNORMAL {TITER}

## 2017-09-22 DIAGNOSIS — R76.0 ANTINUCLEAR ANTIBODY (ANA) TITER GREATER THAN 1:80: ICD-10-CM

## 2017-09-22 PROCEDURE — 86235 NUCLEAR ANTIGEN ANTIBODY: CPT | Performed by: PEDIATRICS

## 2017-09-22 PROCEDURE — 86225 DNA ANTIBODY NATIVE: CPT | Performed by: PEDIATRICS

## 2017-09-22 PROCEDURE — 36415 COLL VENOUS BLD VENIPUNCTURE: CPT | Performed by: PEDIATRICS

## 2017-09-25 LAB
DSDNA AB SER-ACNC: 6 IU/ML
ENA RNP IGG SER IA-ACNC: <0.2 AI (ref 0–0.9)
ENA SCL70 IGG SER IA-ACNC: <0.2 AI (ref 0–0.9)
ENA SM IGG SER-ACNC: <0.2 AI (ref 0–0.9)
ENA SS-A IGG SER IA-ACNC: 0.6 AI (ref 0–0.9)
ENA SS-B IGG SER IA-ACNC: <0.2 AI (ref 0–0.9)

## 2017-09-26 ENCOUNTER — OFFICE VISIT (OUTPATIENT)
Dept: DERMATOLOGY | Facility: CLINIC | Age: 47
End: 2017-09-26
Payer: COMMERCIAL

## 2017-09-26 VITALS
OXYGEN SATURATION: 97 % | HEART RATE: 76 BPM | RESPIRATION RATE: 16 BRPM | BODY MASS INDEX: 27.44 KG/M2 | WEIGHT: 150 LBS

## 2017-09-26 DIAGNOSIS — B07.8 OTHER VIRAL WARTS: Primary | ICD-10-CM

## 2017-09-26 DIAGNOSIS — L30.9 DERMATITIS: ICD-10-CM

## 2017-09-26 PROCEDURE — 99213 OFFICE O/P EST LOW 20 MIN: CPT | Performed by: DERMATOLOGY

## 2017-09-26 RX ORDER — TRIAMCINOLONE ACETONIDE 1 MG/G
OINTMENT TOPICAL
Qty: 30 G | Refills: 1 | Status: SHIPPED | OUTPATIENT
Start: 2017-09-26 | End: 2019-10-01

## 2017-09-26 NOTE — NURSING NOTE
"Chief Complaint   Patient presents with     Wart     checking up on warts, states that there hasn't been much change since last visit. Also notes she would like to have a spot behind left ear looked at.       Initial Pulse 76  Resp 16  Wt 150 lb (68 kg)  SpO2 97%  BMI 27.44 kg/m2 Estimated body mass index is 27.44 kg/(m^2) as calculated from the following:    Height as of 9/18/17: 5' 2\" (157.5 cm).    Weight as of this encounter: 150 lb (68 kg).  Medication Reconciliation: complete  Aniyah Mckeon, KEN  "

## 2017-09-26 NOTE — MR AVS SNAPSHOT
After Visit Summary   9/26/2017    Leti Grover    MRN: [id number]           Patient Information     Date Of Birth          1970        Visit Information        Provider Department      9/26/2017 1:30 PM Mary Ellen Moyer MD [facility]        Today's Diagnoses     Dermatitis    -  1      Care Instructions    Pediatric Dermatology  [address] [place] [address]  [phone]    [facility]      You have been prescribed a medication(s) that [facility]     Please call the pharmacy at [phone] with 24-48 hours of being seen in clinic to get registered in their system and provide them with your insurance information.    Once you are registered in their system, they will do a benefits check and contact you with cost or questions before any medication is billed or mailed.     The medication will be mailed to you. This is done at no additional cost to you.    Squaric Acid Dibutylester (SADBE) for Home Immunotherapy of Common Warts    General Information:  Squaric acid is an immunotherapy used to treat common wart or alopecia areata. This treatment is non-toxic and easy to use. The treatment activates your immune system which may cause irritation or dermatitis.  The immune response helps your body recognize and attack the virus that is causing the warts. This reaction can be managed with a topical steroid ointment. Blistering and lightening of the skin may occur, but rarely.    FIRST STEP: Office Sensitization: 2% SABDE will be applied to a dime-sized area of the upper arm by the doctor or nurse.  This will remain in place until the following morning at which time you can wash it off, sooner if significant redness or irritation noted. A small amount of topical steroid, such as over the counter hydrocortisone 1% ointment, can be used to treat this if it gets red or itchy.     SECOND STEP: Get squaric acid from  the pharmacy: Your doctor will send a prescription of diluted squaric acid to the compounding pharmacy. Once the medication is ready, it will be mailed to your home. From the time it is ordered, it takes 7-14 days for the pharmacy to prepare it and mail it to your home. Sometimes this medication is not covered by insurance.  If this happens and you are unable or do not wish to pay out of pocket for the medication, please call our clinic to discuss different treatment options.    Starting therapy:  1. Patient to start applying a small amount of 0.2% squaric acid (SADBE) by using a cotton-tipped applicator. You should start out using it every other night. If tolerating and not experiencing significant redness and irritation, increase application to nightly.  2. Apply a small amount with a cotton-tipped applicator to warts each day.  3. Avoid application to facial or genital warts unless instructed to do so by the doctor.  4. The medicine is drippy and evaporates easily, so application should be quick and be performed with care not to spill it or drip it. Keep this medication in the refrigerator.    Note: If you develop blistering or severe irritation during therapy with SADBE, discontinue therapy and contact the Pediatric Dermatology Clinic for instructions.    What to expect: Responders can expect improvements as early as one month, and clearance as early as three months. No change will generally be seen for the first 4 weeks of therapy. If there is no reaction, do not stop the medication because you do not think it is working. Just be patient.     For questions: If you have questions or concerns, please contact the clinic at [phone].            Follow-ups after your visit        Your next 10 appointments already scheduled     Oct 09, 2017  4:30 PM CDT   New Visit with Jason Mccarty MD   [facility] ([facility])    [address] [address]    [phone]              Who to contact     If you have questions or need follow up information about today's clinic visit or your schedule please contact [facility] directly at [phone].  Normal or non-critical lab and imaging results will be communicated to you by MyChart, letter or phone within 4 business days after the clinic has received the results. If you do not hear from us within 7 days, please contact the clinic through [employer] or phone. If you have a critical or abnormal lab result, we will notify you by phone as soon as possible.  Submit refill requests through [employer] or call your pharmacy and they will forward the refill request to us. Please allow 3 business days for your refill to be completed.          Additional Information About Your Visit        [employer] Information     [employer] gives you secure access to your electronic health record. If you see a primary care provider, you can also send messages to your care team and make appointments. If you have questions, please call your primary care clinic.  If you do not have a primary care provider, please call [phone] and they will assist you.        Care EveryWhere ID     This is your Care EveryWhere ID. This could be used by other organizations to access your [facility] medical records  [id number]        Your Vitals Were     Pulse Respirations Pulse Oximetry BMI (Body Mass Index)          76 16 97% 27.44 kg/m2         Blood Pressure from Last 3 Encounters:   09/18/17 120/78   03/09/17 106/76   09/12/16 110/70    Weight from Last 3 Encounters:   09/26/17 150 lb (68 kg)   09/18/17 151 lb 11.2 oz (68.8 kg)   04/18/17 157 lb 6.5 oz (71.4 kg)              Today, you had the following     No orders found for display         Today's Medication Changes          These changes are accurate as of: 9/26/17  2:10 PM.  If you have any questions, ask your nurse or doctor.               Start taking these medicines.        Dose/Directions     triamcinolone 0.1 % ointment   Commonly known as:  KENALOG   Used for:  Dermatitis   Started by:  Mary Ellen Moyer MD        To area behind ear twice daily until rash is clear.   Quantity:  30 g   Refills:  1            Where to get your medicines      These medications were sent to [id number] - [place], [place] - [address] AT [facility] [address]  [address], [facility] [address]     Phone:  [phone]     triamcinolone 0.1 % ointment                Primary Care Provider Office Phone # Fax #    Mary Ellen Wise [phone] [address] [place] [address]        Equal Access to Services     LEONID RAMIREZ AH: Hadii aad ku hadasho Soomaali, waaxda luqadaha, qaybta kaalmada adeegyada, waxay idiin hayericn sandra stein . So [facility] [phone].    ATENCIÓN: Si habla español, tiene a lin disposición servicios gratuitos de asistencia lingüística. [facility] [phone].    We comply with applicable federal civil rights laws and [place] laws. We do not discriminate on the basis of race, color, national origin, age, disability sex, sexual orientation or gender identity.            Thank you!     Thank you for choosing [facility]  for your care. Our goal is always to provide you with excellent care. Hearing back from our patients is one way we can continue to improve our services. Please take a few minutes to complete the written survey that you may receive in the mail after your visit with us. Thank you!             Your Updated Medication List - Protect others around you: Learn how to safely use, store and throw away your medicines at www.disposemymeds.org.          This list is accurate as of: 9/26/17  2:10 PM.  Always use your most recent med list.                   Brand Name Dispense Instructions for use Diagnosis    blood glucose calibration solution    no brand specified    1 each    Use to calibrate blood glucose monitor  as directed.    Prediabetes       blood glucose monitoring meter device kit     1 kit    Use to test blood sugar 1 times daily or as directed.    Prediabetes       blood glucose monitoring test strip    no brand specified    100 each    Use to test blood sugars 1 times daily or as directed    Prediabetes       CHOLEST OFF PO           COMPOUND W EX           FLUoxetine 10 MG capsule    PROzac    270 capsule    Take 3 capsules (30 mg) by mouth daily    Major depressive disorder, recurrent episode, moderate (H)       loratadine 10 MG tablet    CLARITIN    90 tablet    Take 1 tablet (10 mg) by mouth daily    Post-nasal drip       Multi-vitamin Tabs tablet     100 tablet    Take 1 tablet by mouth daily        order for DME     1 Package    Equipment being ordered: CPAP supplies. Length of Need: Lifetime    YAKELIN (obstructive sleep apnea)       oxybutynin 10 MG 24 hr tablet    DITROPAN XL    90 tablet    Take 1 tablet (10 mg) by mouth daily    Mixed incontinence urge and stress (male)(female)       thin lancets    NO BRAND SPECIFIED    1 Box    Use to test blood sugar 1 times daily or as directed.    Prediabetes       triamcinolone 0.1 % ointment    KENALOG    30 g    To area behind ear twice daily until rash is clear.    Dermatitis

## 2017-09-26 NOTE — LETTER
9/26/2017      RE: Leti Grover  [address]  [facility] [address]       DERMATOLOGY CLINIC VISIT NOTE      CHIEF COMPLAINT:  Followup warts.      HISTORY OF PRESENT ILLNESS:  Ms. Grover is a 47-year-old female returning to Dermatology Clinic for ongoing evaluation and treatment of chronic hand warts.  She was last seen in clinic on 08/29/2017.  Since that time, she has noted no improvement in her warts.      DERMATOLOGY PROBLEM LIST:   1.  Hand warts for 5-10 years, status post biopsy, Candida antigen x3, home salicylic acid, home 5-fluoroucil, cryotherapy monthly.   2.  Actinic keratosis on the nose, status post Efudex in 03/2017.      FAMILY HISTORY:  Son with hand wart.      SOCIAL HISTORY:  , lives in [place], 2 children.      REVIEW OF SYSTEMS:  Feels well without additional skin concerns.      PHYSICAL EXAMINATION:   Pulse 76  Resp 16  Wt 150 lb (68 kg)  SpO2 97%  BMI 27.44 kg/m2    GENERAL:  The patient is a healthy-appearing 47-year-old female in no distress.   HEENT:  Conjunctivae are clear.   PULMONARY:  Breathing comfortably on room air.   SKIN:  Exam was focused on the hands.  Examination of the left third dorsal finger as well as the left first finger with approximately 1 cm hyperkeratotic verrucous plaques.      ASSESSMENT AND PLAN:   1.  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  Recommended a trial of squaric acid.  Prescribed 0.2% squaric acid compounded in acetone to be applied every other night to warts.  I advised that if excessive irritation develops patient to decreased frequency of use.  The patient to return to clinic in 2 months' time for reevaluation.      Mary Ellen Moyer MD  Pediatric Dermatology Staff

## 2017-09-26 NOTE — LETTER
9/26/2017      RE: Leti Grover  [address]  [facility] [address]       DERMATOLOGY CLINIC VISIT NOTE      CHIEF COMPLAINT:  Followup warts.      HISTORY OF PRESENT ILLNESS:  Ms. Grover is a 47-year-old female returning to Dermatology Clinic for ongoing evaluation and treatment of chronic hand warts.  She was last seen in clinic on 08/29/2017.  Since that time, she has noted no improvement in her warts.      DERMATOLOGY PROBLEM LIST:   1.  Hand warts for 5-10 years, status post biopsy, Candida antigen x3, home salicylic acid, home 5-fluoroucil, cryotherapy monthly.   2.  Actinic keratosis on the nose, status post Efudex in 03/2017.      FAMILY HISTORY:  Son with hand wart.      SOCIAL HISTORY:  , lives in [place], 2 children.      REVIEW OF SYSTEMS:  Feels well without additional skin concerns.      PHYSICAL EXAMINATION:   Pulse 76  Resp 16  Wt 150 lb (68 kg)  SpO2 97%  BMI 27.44 kg/m2    GENERAL:  The patient is a healthy-appearing 47-year-old female in no distress.   HEENT:  Conjunctivae are clear.   PULMONARY:  Breathing comfortably on room air.   SKIN:  Exam was focused on the hands.  Examination of the left third dorsal finger as well as the left first finger with approximately 1 cm hyperkeratotic verrucous plaques.      ASSESSMENT AND PLAN:   1.  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  Recommended a trial of squaric acid.  Prescribed 0.2% squaric acid compounded in acetone to be applied every other night to warts.  I advised that if excessive irritation develops patient to decreased frequency of use.  The patient to return to clinic in 2 months' time for reevaluation.      Mary Ellen Moyer MD  Pediatric Dermatology Staff        Mary Ellen Moyer MD

## 2017-09-27 NOTE — PROGRESS NOTES
DERMATOLOGY CLINIC VISIT NOTE      CHIEF COMPLAINT:  Followup warts.      HISTORY OF PRESENT ILLNESS:  Ms. Grover is a 47-year-old female returning to Dermatology Clinic for ongoing evaluation and treatment of chronic hand warts.  She was last seen in clinic on 08/29/2017.  Since that time, she has noted no improvement in her warts.      DERMATOLOGY PROBLEM LIST:   1.  Hand warts for 5-10 years, status post biopsy, Candida antigen x3, home salicylic acid, home 5-fluoroucil, cryotherapy monthly.   2.  Actinic keratosis on the nose, status post Efudex in 03/2017.      FAMILY HISTORY:  Son with hand wart.      SOCIAL HISTORY:  , lives in [place], 2 children.      REVIEW OF SYSTEMS:  Feels well without additional skin concerns.      PHYSICAL EXAMINATION:   Pulse 76  Resp 16  Wt 150 lb (68 kg)  SpO2 97%  BMI 27.44 kg/m2    GENERAL:  The patient is a healthy-appearing 47-year-old female in no distress.   HEENT:  Conjunctivae are clear.   PULMONARY:  Breathing comfortably on room air.   SKIN:  Exam was focused on the hands.  Examination of the left third dorsal finger as well as the left first finger with approximately 1 cm hyperkeratotic verrucous plaques.      ASSESSMENT AND PLAN:   1.  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  Recommended a trial of squaric acid.  Prescribed 0.2% squaric acid compounded in acetone to be applied every other night to warts.  I advised that if excessive irritation develops patient to decreased frequency of use.  The patient to return to clinic in 2 months' time for reevaluation.      Mary Ellen Moyer MD  Pediatric Dermatology Staff

## 2017-10-06 NOTE — PROGRESS NOTES
[facility]ology Clinic Visit     Leti Grover MRN# [id number]   YOB: 1970    Primary care provider: Mary Ellen Wise  Oct 9, 2017          Assessment and Plan:   # Polyarthralgia X 6 weeks  # Weight loss  # ANNETTE 1:640 homogenous; dsDNA, THOMPSON panel neg.   # ALT elevation    RF and CCP antibodies are normal.   Sed rate within normal limits     Weight loss present. But appetite is good. We will watch this.   Mammogram - 2016- negative  Pap smear - due    We will do the following work up for polyarthralgia in the setting of positive ANNETTE.   Patient was not interested in starting any daily med for polyarthalgia. We will reassess in 6 weeks.      The labs, imaging from patient records are reviewed.     I will be back in touch with the patient through mychart/letter when results are available.     Most Recent Immunizations   Administered Date(s) Administered     HEPA 08/12/2009     HepB 05/11/2005     Influenza Vaccine IM 3yrs+ 4 Valent IIV4 09/18/2017     Mantoux 05/11/2005     Rhogam 09/23/2011     TD (ADULT, 7+) 05/11/2005     TDAP Vaccine (Adacel) 08/12/2009       Orders Placed This Encounter   Procedures     Complement C4     Complement C3     CBC with platelets differential     TSH with free T4 reflex     Return in about 4 weeks (around 11/6/2017).    Medications Discontinued During This Encounter   Medication Reason     COMPOUNDED NON-CONTROLLED SUBSTANCE (CMPD RX) - PHARMACY TO MIX COMPOUNDED MEDICATION      Salicylic Acid (COMPOUND W EX)      Current Outpatient Prescriptions   Medication Sig Dispense Refill     triamcinolone (KENALOG) 0.1 % ointment To area behind ear twice daily until rash is clear. 30 g 1     loratadine (CLARITIN) 10 MG tablet Take 1 tablet (10 mg) by mouth daily 90 tablet 3     blood glucose monitoring (NO BRAND SPECIFIED) test strip Use to test blood sugars 1 times daily or as directed 100 each 3     thin (NO BRAND SPECIFIED) lancets Use to test blood sugar 1 times  daily or as directed. 1 Box 3     Plant Sterols and Stanols (CHOLEST OFF PO)        FLUoxetine (PROZAC) 10 MG capsule Take 3 capsules (30 mg) by mouth daily 270 capsule 3     oxybutynin (DITROPAN XL) 10 MG 24 hr tablet Take 1 tablet (10 mg) by mouth daily 90 tablet 3     order for DME Equipment being ordered: CPAP supplies. Length of Need: Lifetime 1 Package 99     blood glucose calibration (NO BRAND SPECIFIED) solution Use to calibrate blood glucose monitor as directed. 1 each 0     blood glucose monitoring ([employer] CONTOUR NEXT MONITOR) meter device kit Use to test blood sugar 1 times daily or as directed. 1 kit 0     multivitamin, therapeutic with minerals (MULTI-VITAMIN) TABS Take 1 tablet by mouth daily 100 tablet 3       Jason Mccarty MD  [facility]          Active Problem List:     Patient Active Problem List    Diagnosis Date Noted     History of gestational diabetes 09/12/2016     Priority: Medium     Obesity 03/10/2014     Priority: Medium     Problem list name updated by automated process. Provider to review       Chronic rhinitis 09/20/2013     Priority: Medium     ASCUS favoring dysplasia 12/02/2010     Priority: Medium     8/12/09 pap = ASC-H  10/22/09 COLP = RADHA 1  5/13/10 pap = ASCUS, HPV neg  6/24/10 COLP = RADHA 1  12/2/10 pap = ASCUS, HPV neg  6/3/11 pap = Neg pap and neg hpv screening---r/p pap in 1 yr or at postpartum visit (EDC -12/23/11)  05/10/13 MyChart reminder letter sent. Patient plans to repeat pap in July 2013  09/15/14 Pap= NIL, Neg HPV. 3 yr co-testing       HYPERLIPIDEMIA LDL GOAL <160 02/10/2010     Priority: Medium     YAKELIN (obstructive sleep apnea) 01/28/2010     Priority: Medium     Moderate Major Depression - Jennifer Deactivated 12/27/2009     Priority: Medium     Elevated blood pressure reading without diagnosis of hypertension 08/28/2008     Priority: Medium     Hyperlipidemia      Priority: Medium     Problem list name updated by automated process. Provider  to review              History of Present Illness:     Chief Complaint   Patient presents with     [facility] Care       October 6, 2017  Have you ever seen a rheumatologist No Who NA When NA  Joint pain history  Onset: pt states that she has stiff and sore joints  Involved joints: fingers (PIPs), ankles, knees X > 6 weeks (even before she started a new job)  Pain scale:  6/10     Wakes the patient from sleep : No  Morning stiffness:Yes for 5 minutes  Meds used:none      Interim history  Since last visit:  1. Infections - No  2. New symptoms/medical problem - No  3. Any side effects from Rheum medications -NA  3. ER visits/Hospitalizations/surgeries - No  4. Last PCP visit: 9/18/17    More active in the last one month  Wt Readings from Last 4 Encounters:   10/09/17 67.9 kg (149 lb 12.8 oz)   09/26/17 68 kg (150 lb)   09/18/17 68.8 kg (151 lb 11.2 oz)   04/18/17 71.4 kg (157 lb 6.5 oz)    fatigue because of sleep issues per patient    No h/o loss of appetite, fevers, rash, swollen glands  No h/o gout  No family or personal history of psoriasis, ulcerative colitis or chron's disease. No h/o iritis.   Patient denies any raynauds, malar rash, photosensitivity, recurrent mouth ulcers or arterial/venous thrombosis in the past  No h/o persistent shortness of breath, cough, chest pain  No h/o persistent nausea, vomiting, constipation, diarrhea, abdominal pain  No h/o hematochezia, hematuria, hemoptysis, hematemesis  No h/o seizures or strokes  No h/o cancer    BP Readings from Last 3 Encounters:   10/09/17 118/84   09/18/17 120/78   03/09/17 106/76              Review of Systems:   Complete ROS negative except for symptoms mentioned in the HPI          Past Medical History:     Past Medical History:   Diagnosis Date     Abnormal Pap smear      Cervical dysplasia 10/22/09    RADHA 1     Other and unspecified hyperlipidemia      Preeclampsia      Wounds and injuries      Past Surgical History:   Procedure Laterality Date     NO  HISTORY OF SURGERY              Social History:     Social History     Occupational History      Homemaker           [employer]     Social History Main Topics     Smoking status: Never Smoker     Smokeless tobacco: Never Used     Alcohol use No     Drug use: No     Sexual activity: Yes     Partners: Male      Comment:  had a vasectomy          Family History:     Family History   Problem Relation Age of Onset     CANCER Father      skin cancer     DIABETES Other      C.A.D. No family hx of      Breast Cancer No family hx of      Cancer - colorectal No family hx of             Allergies:   No Known Allergies         Medications:     Current Outpatient Prescriptions   Medication Sig Dispense Refill     triamcinolone (KENALOG) 0.1 % ointment To area behind ear twice daily until rash is clear. 30 g 1     loratadine (CLARITIN) 10 MG tablet Take 1 tablet (10 mg) by mouth daily 90 tablet 3     blood glucose monitoring (NO BRAND SPECIFIED) test strip Use to test blood sugars 1 times daily or as directed 100 each 3     thin (NO BRAND SPECIFIED) lancets Use to test blood sugar 1 times daily or as directed. 1 Box 3     Plant Sterols and Stanols (CHOLEST OFF PO)        FLUoxetine (PROZAC) 10 MG capsule Take 3 capsules (30 mg) by mouth daily 270 capsule 3     oxybutynin (DITROPAN XL) 10 MG 24 hr tablet Take 1 tablet (10 mg) by mouth daily 90 tablet 3     order for DME Equipment being ordered: CPAP supplies. Length of Need: Lifetime 1 Package 99     blood glucose calibration (NO BRAND SPECIFIED) solution Use to calibrate blood glucose monitor as directed. 1 each 0     blood glucose monitoring ([employer] CONTOUR NEXT MONITOR) meter device kit Use to test blood sugar 1 times daily or as directed. 1 kit 0     multivitamin, therapeutic with minerals (MULTI-VITAMIN) TABS Take 1 tablet by mouth daily 100 tablet 3            Physical Exam:   Blood pressure 118/84, pulse 70, temperature 97.8  F (36.6  C), temperature source Oral,  "height 1.575 m (5' 2\"), weight 67.9 kg (149 lb 12.8 oz), SpO2 97 %, not currently breastfeeding.  Wt Readings from Last 4 Encounters:   10/09/17 67.9 kg (149 lb 12.8 oz)   09/26/17 68 kg (150 lb)   09/18/17 68.8 kg (151 lb 11.2 oz)   04/18/17 71.4 kg (157 lb 6.5 oz)       Constitutional: well-developed, appearing stated age; cooperative  Eyes: PERRLA, normal conjunctiva, sclera  ENT: nl external ears, nose, lips.No mucous membrane lesions, normal saliva pool  Neck: no cervical lymphadenopathy  Resp: lungs clear to auscultation,   CV: RRR, no added sounds, no edema  GI: Abdomen soft and no tenderness  : not tested  Lymph: no cervical, supraclavicular or epitrochlear nodes  MS: warts in left 1st and 3rd fingers. Tenderness in left 2nd MCP.   All shoulder, elbow, wrist, PIP/DIP, hip, knee, ankle, and foot MTP/IP joints were examined and  found normal. No active synovitis or deformity. Full ROM.  Fist 100%.  No dactylitis,  tenosynovitis, enthesopathy.  Skin: no rash in exposed areas  Psych: nl judgement, orientation, memory, affect.         Data:     CBC RESULTS:   Recent Labs   Lab Test  12/01/11   0605  11/30/11   0855   WBC   --   12.1*   RBC   --   5.01   HGB  13.8  15.2   HCT   --   46.3   MCV   --   92   MCH   --   30.3   MCHC   --   32.8   RDW   --   14.1   PLT   --   177       Creatinine   Date Value Ref Range Status   09/18/2017 0.82 0.52 - 1.04 mg/dL Final   ]    Uric Acid   Date Value Ref Range Status   11/30/2011 3.9 2.5 - 7.0 mg/dL Final   ]    HLA-B27  No results for input(s): [id number], B1 in the last 12396 hours.  RF/CCP  Recent Labs   Lab Test  09/18/17   0903   CCPIGG  1   RHF  <20     ANNETTE/RNP/Sm/SSA/SSB  Recent Labs   Lab Test  09/22/17   0853  05/24/11   0929   RNPIGG  <0.2   --    SMIGG  <0.2   --    SSAIGG  0.6   --    SSBIGG  <0.2   --    SCLIGG  <0.2   --    TREPAB   --   Negative       Jason Mccarty MD    [facility]    "

## 2017-10-09 ENCOUNTER — OFFICE VISIT (OUTPATIENT)
Dept: RHEUMATOLOGY | Facility: CLINIC | Age: 47
End: 2017-10-09
Attending: PEDIATRICS
Payer: COMMERCIAL

## 2017-10-09 VITALS
BODY MASS INDEX: 27.57 KG/M2 | WEIGHT: 149.8 LBS | SYSTOLIC BLOOD PRESSURE: 118 MMHG | HEART RATE: 70 BPM | HEIGHT: 62 IN | DIASTOLIC BLOOD PRESSURE: 84 MMHG | TEMPERATURE: 97.8 F | OXYGEN SATURATION: 97 %

## 2017-10-09 DIAGNOSIS — R76.8 POSITIVE ANA (ANTINUCLEAR ANTIBODY): Primary | ICD-10-CM

## 2017-10-09 LAB
BASOPHILS # BLD AUTO: 0.1 10E9/L (ref 0–0.2)
BASOPHILS NFR BLD AUTO: 0.4 %
DIFFERENTIAL METHOD BLD: ABNORMAL
EOSINOPHIL # BLD AUTO: 0.3 10E9/L (ref 0–0.7)
EOSINOPHIL NFR BLD AUTO: 2.2 %
ERYTHROCYTE [DISTWIDTH] IN BLOOD BY AUTOMATED COUNT: 12.7 % (ref 10–15)
HCT VFR BLD AUTO: 44.7 % (ref 35–47)
HGB BLD-MCNC: 14.7 G/DL (ref 11.7–15.7)
LYMPHOCYTES # BLD AUTO: 3.9 10E9/L (ref 0.8–5.3)
LYMPHOCYTES NFR BLD AUTO: 32.4 %
MCH RBC QN AUTO: 30.6 PG (ref 26.5–33)
MCHC RBC AUTO-ENTMCNC: 32.9 G/DL (ref 31.5–36.5)
MCV RBC AUTO: 93 FL (ref 78–100)
MONOCYTES # BLD AUTO: 0.5 10E9/L (ref 0–1.3)
MONOCYTES NFR BLD AUTO: 4.4 %
NEUTROPHILS # BLD AUTO: 7.3 10E9/L (ref 1.6–8.3)
NEUTROPHILS NFR BLD AUTO: 60.6 %
PLATELET # BLD AUTO: 233 10E9/L (ref 150–450)
RBC # BLD AUTO: 4.81 10E12/L (ref 3.8–5.2)
WBC # BLD AUTO: 12.1 10E9/L (ref 4–11)

## 2017-10-09 PROCEDURE — 36415 COLL VENOUS BLD VENIPUNCTURE: CPT | Performed by: INTERNAL MEDICINE

## 2017-10-09 PROCEDURE — 85025 COMPLETE CBC W/AUTO DIFF WBC: CPT | Performed by: INTERNAL MEDICINE

## 2017-10-09 PROCEDURE — 84443 ASSAY THYROID STIM HORMONE: CPT | Performed by: INTERNAL MEDICINE

## 2017-10-09 PROCEDURE — 86160 COMPLEMENT ANTIGEN: CPT | Performed by: INTERNAL MEDICINE

## 2017-10-09 PROCEDURE — 99204 OFFICE O/P NEW MOD 45 MIN: CPT | Performed by: INTERNAL MEDICINE

## 2017-10-09 ASSESSMENT — ROUTINE ASSESSMENT OF PATIENT INDEX DATA (RAPID3)
RAPID3 INTERPRETATION: HIGH > 12.0
TOTAL RAPID3 SCORE: 13.3

## 2017-10-09 NOTE — MR AVS SNAPSHOT
After Visit Summary   10/9/2017    Leti Grover    MRN: [id number]           Patient Information     Date Of Birth          1970        Visit Information        Provider Department      10/9/2017 4:30 PM Jason Mccarty MD [facility]        Today's Diagnoses     Positive ANNETTE (antinuclear antibody)    -  1       Follow-ups after your visit        Follow-up notes from your care team     Return in about 4 weeks (around 11/6/2017).      Your next 10 appointments already scheduled     Nov 14, 2017  8:00 AM CST   Return Visit with Mary Ellen Moyer MD   [facility]    [address]  [address]  [place] [address] [phone]              Who to contact     If you have questions or need follow up information about today's clinic visit or your schedule please contact [facility] directly at [phone].  Normal or non-critical lab and imaging results will be communicated to you by MyChart, letter or phone within 4 business days after the clinic has received the results. If you do not hear from us within 7 days, please contact the clinic through [employer] or phone. If you have a critical or abnormal lab result, we will notify you by phone as soon as possible.  Submit refill requests through [employer] or call your pharmacy and they will forward the refill request to us. Please allow 3 business days for your refill to be completed.          Additional Information About Your Visit        MyChart Information     [employer] gives you secure access to your electronic health record. If you see a primary care provider, you can also send messages to your care team and make appointments. If you have questions, please call your primary care clinic.  If you do not have a primary care provider, please call [phone] and they will assist you.        Care EveryWhere ID     This is your Care EveryWhere ID. This could be used by  "other organizations to access your [facility] medical records  [id number]        Your Vitals Were     Pulse Temperature Height Pulse Oximetry BMI (Body Mass Index)       70 97.8  F (36.6  C) (Oral) 1.575 m (5' 2\") 97% 27.4 kg/m2        Blood Pressure from Last 3 Encounters:   10/09/17 118/84   09/18/17 120/78   03/09/17 106/76    Weight from Last 3 Encounters:   10/09/17 67.9 kg (149 lb 12.8 oz)   09/26/17 68 kg (150 lb)   09/18/17 68.8 kg (151 lb 11.2 oz)              We Performed the Following     CBC with platelets differential     Complement C3     Complement C4     TSH with free T4 reflex          Today's Medication Changes          These changes are accurate as of: 10/9/17  5:10 PM.  If you have any questions, ask your nurse or doctor.               Stop taking these medicines if you haven't already. Please contact your care team if you have questions.     COMPOUND W EX   Stopped by:  Jason Mccarty MD           COMPOUNDED NON-CONTROLLED SUBSTANCE - PHARMACY TO MIX COMPOUNDED MEDICATION   Commonly known as:  CMPD RX   Stopped by:  Jason Mccarty MD                    Primary Care Provider Office Phone # Fax #    Mary Ellen Wise [phone] [address] [place] [address]        Equal Access to Services     [facility] AH: Hadii uli rosen hadleslio Somaverick, waaxda luqadaha, qaybta kaalmada gabriela xie. So [facility] [phone].    ATENCIÓN: Si habla español, tiene a lin disposición servicios gratuitos de asistencia lingüística. Lljahaira al [phone].    We comply with applicable federal civil rights laws and [place] laws. We do not discriminate on the basis of race, color, national origin, age, disability, sex, sexual orientation, or gender identity.            Thank you!     Thank you for choosing [facility]  for your care. Our goal is always to provide you with excellent care. Hearing back from our " patients is one way we can continue to improve our services. Please take a few minutes to complete the written survey that you may receive in the mail after your visit with us. Thank you!             Your Updated Medication List - Protect others around you: Learn how to safely use, store and throw away your medicines at www.disposemymeds.org.          This list is accurate as of: 10/9/17  5:10 PM.  Always use your most recent med list.                   Brand Name Dispense Instructions for use Diagnosis    blood glucose calibration solution    no brand specified    1 each    Use to calibrate blood glucose monitor as directed.    Prediabetes       blood glucose monitoring meter device kit     1 kit    Use to test blood sugar 1 times daily or as directed.    Prediabetes       blood glucose monitoring test strip    no brand specified    100 each    Use to test blood sugars 1 times daily or as directed    Prediabetes       CHOLEST OFF PO           FLUoxetine 10 MG capsule    PROzac    270 capsule    Take 3 capsules (30 mg) by mouth daily    Major depressive disorder, recurrent episode, moderate (H)       loratadine 10 MG tablet    CLARITIN    90 tablet    Take 1 tablet (10 mg) by mouth daily    Post-nasal drip       Multi-vitamin Tabs tablet     100 tablet    Take 1 tablet by mouth daily        order for DME     1 Package    Equipment being ordered: CPAP supplies. Length of Need: Lifetime    YAKELIN (obstructive sleep apnea)       oxybutynin 10 MG 24 hr tablet    DITROPAN XL    90 tablet    Take 1 tablet (10 mg) by mouth daily    Mixed incontinence urge and stress (male)(female)       thin lancets    NO BRAND SPECIFIED    1 Box    Use to test blood sugar 1 times daily or as directed.    Prediabetes       triamcinolone 0.1 % ointment    KENALOG    30 g    To area behind ear twice daily until rash is clear.    Dermatitis

## 2017-10-09 NOTE — NURSING NOTE
"Chief Complaint   Patient presents with     [facility] Care       Initial /84 (BP Location: Right arm, Patient Position: Chair, Cuff Size: Adult Regular)  Pulse 70  Temp 97.8  F (36.6  C) (Oral)  Ht 1.575 m (5' 2\")  Wt 67.9 kg (149 lb 12.8 oz)  SpO2 97%  BMI 27.4 kg/m2 Estimated body mass index is 27.4 kg/(m^2) as calculated from the following:    Height as of this encounter: 1.575 m (5' 2\").    Weight as of this encounter: 67.9 kg (149 lb 12.8 oz).  Medication Reconciliation: complete    Have you ever seen a rheumatologist No Who NA When NA  Joint pain history  Onset: pt states that she has stiff and sore joints  Involved joints: fingers, ankles, knees  Pain scale:  6/10     Wakes the patient from sleep : No  Morning stiffness:Yes for 5 minutes  Meds used:none    Interim history  Since last visit:  1. Infections - No  2. New symptoms/medical problem - No  3. Any side effects from Rheum medications -NA  3. ER visits/Hospitalizations/surgeries - No  4. Last PCP visit: 9/18/17  Wt Readings from Last 4 Encounters:   10/09/17 67.9 kg (149 lb 12.8 oz)   09/26/17 68 kg (150 lb)   09/18/17 68.8 kg (151 lb 11.2 oz)   04/18/17 71.4 kg (157 lb 6.5 oz)     BP Readings from Last 3 Encounters:   10/09/17 118/84   09/18/17 120/78   03/09/17 106/76         "

## 2017-10-10 LAB — TSH SERPL DL<=0.005 MIU/L-ACNC: 1.09 MU/L (ref 0.4–4)

## 2017-10-11 LAB
C3 SERPL-MCNC: 147 MG/DL (ref 76–169)
C4 SERPL-MCNC: 25 MG/DL (ref 15–50)

## 2017-10-16 NOTE — PROGRESS NOTES
Results released to [facility]:  Borderline white cell count elevation.   Thyroid test is normal.   Complements are normal.     Sincerely    Jason Mccarty MD  [facility]

## 2017-10-24 ENCOUNTER — MYC MEDICAL ADVICE (OUTPATIENT)
Dept: PEDIATRICS | Facility: CLINIC | Age: 47
End: 2017-10-24

## 2017-10-24 DIAGNOSIS — F33.1 MAJOR DEPRESSIVE DISORDER, RECURRENT EPISODE, MODERATE (H): ICD-10-CM

## 2017-10-24 RX ORDER — FLUOXETINE 10 MG/1
10 CAPSULE ORAL DAILY
Qty: 90 CAPSULE | Refills: 1 | Status: SHIPPED | OUTPATIENT
Start: 2017-10-24 | End: 2018-07-07

## 2017-10-24 NOTE — TELEPHONE ENCOUNTER
Patient called back, she has been taking a 20 mg cap & 10 mg cap to equal a 30 mg dose daily.   Called pharmacy, she is out of refills on a 20 mg cap, but does have refills on the 10 mg cap. Easiest to send new rx's.     New rx's sent for remaining refills on the 3/9/17 rx.

## 2017-10-24 NOTE — TELEPHONE ENCOUNTER
"Patient inquiring on status of Fluoxetine refill for \"both\" doses.     Medication list shows only Fluoxetine 10 mg caps - take 3 daily on file. There are refills on file from the 3/9/17 rx that was sent for a year.     I am unsure of the issue, patient should be able to refill without problem. I am unsure what both doses she is needing.    Left a voicemail to call back the clinic to discuss and figure out. Will send Apset message to patient.   "

## 2017-11-06 ENCOUNTER — OFFICE VISIT (OUTPATIENT)
Dept: RHEUMATOLOGY | Facility: CLINIC | Age: 47
End: 2017-11-06
Payer: COMMERCIAL

## 2017-11-06 VITALS
OXYGEN SATURATION: 97 % | BODY MASS INDEX: 27.47 KG/M2 | WEIGHT: 150.2 LBS | DIASTOLIC BLOOD PRESSURE: 82 MMHG | RESPIRATION RATE: 14 BRPM | SYSTOLIC BLOOD PRESSURE: 124 MMHG | HEART RATE: 70 BPM

## 2017-11-06 DIAGNOSIS — R76.8 POSITIVE ANA (ANTINUCLEAR ANTIBODY): Primary | ICD-10-CM

## 2017-11-06 PROCEDURE — 99213 OFFICE O/P EST LOW 20 MIN: CPT | Performed by: INTERNAL MEDICINE

## 2017-11-06 ASSESSMENT — ROUTINE ASSESSMENT OF PATIENT INDEX DATA (RAPID3)
RAPID3 INTERPRETATION: MODERATE 6.1-12.0
TOTAL RAPID3 SCORE: 11.7

## 2017-11-06 NOTE — NURSING NOTE
"Chief Complaint   Patient presents with     RECHECK       Initial /82 (BP Location: Right arm, Patient Position: Chair, Cuff Size: Adult Regular)  Pulse 70  Resp 14  Wt 68.1 kg (150 lb 3.2 oz)  SpO2 97%  BMI 27.47 kg/m2 Estimated body mass index is 27.47 kg/(m^2) as calculated from the following:    Height as of 10/9/17: 1.575 m (5' 2\").    Weight as of this encounter: 68.1 kg (150 lb 3.2 oz).  Medication Reconciliation: complete    Have you ever seen a rheumatologist NO  Joint pain history  Onset: Spring 2017  Involved joints: fingers  Pain scale:  5/10     Wakes the patient from sleep : No  Morning stiffness:al the time  Meds used: see med list, no rheum meds    Interim history  Since last visit:  1. Infections - No  2. New symptoms/medical problem - No  3. Any side effects from Rheum medications - NA  3. ER visits/Hospitalizations/surgeries - No  4. Last PCP visit: 9/18/17  Wt Readings from Last 4 Encounters:   11/06/17 68.1 kg (150 lb 3.2 oz)   10/09/17 67.9 kg (149 lb 12.8 oz)   09/26/17 68 kg (150 lb)   09/18/17 68.8 kg (151 lb 11.2 oz)     BP Readings from Last 3 Encounters:   11/06/17 124/82   10/09/17 118/84   09/18/17 120/78       "

## 2017-11-06 NOTE — MR AVS SNAPSHOT
After Visit Summary   11/6/2017    Leti Grover    MRN: [id number]           Patient Information     Date Of Birth          1970        Visit Information        Provider Department      11/6/2017 11:00 AM Jason Mccarty MD [facility]         Follow-ups after your visit        Your next 10 appointments already scheduled     Nov 14, 2017  8:00 AM CST   Return Visit with Mary Ellen Moyer MD   [facility]    [address] [place] [address]  [address]  [place] [address] [phone]            Dec 07, 2017  8:45 AM CST   MyChart OB-GYN Annual Physical with Alla Garner MD   [facility] ([facility]    [address] [place] [address]  [address]  [place] [address] [phone]              Who to contact     If you have questions or need follow up information about today's clinic visit or your schedule please contact [facility] directly at [phone].  Normal or non-critical lab and imaging results will be communicated to you by [employer], letter or phone within 4 business days after the clinic has received the results. If you do not hear from us within 7 days, please contact the clinic through [employer] or phone. If you have a critical or abnormal lab result, we will notify you by phone as soon as possible.  Submit refill requests through [employer] or call your pharmacy and they will forward the refill request to us. Please allow 3 business days for your refill to be completed.          Additional Information About Your Visit        MyChart Information     [employer] gives you secure access to your electronic health record. If you see a primary care provider, you can also send messages to your care team and make appointments. If you have questions, please call your primary care clinic.  If you do not have a primary care provider, please call [phone] and they will assist you.        Care  EveryWhere ID     This is your Care EveryWhere ID. This could be used by other organizations to access your [facility] medical records  [id number]        Your Vitals Were     Pulse Respirations Pulse Oximetry BMI (Body Mass Index)          70 14 97% 27.47 kg/m2         Blood Pressure from Last 3 Encounters:   11/06/17 124/82   10/09/17 118/84   09/18/17 120/78    Weight from Last 3 Encounters:   11/06/17 68.1 kg (150 lb 3.2 oz)   10/09/17 67.9 kg (149 lb 12.8 oz)   09/26/17 68 kg (150 lb)              Today, you had the following     No orders found for display       Primary Care Provider Office Phone # Fax #    Mary Ellen Wise [phone] [address] [place] [address]        Equal Access to Services     [facility]: Hadii aad jessika hadasho Soomaali, waaxda luqadaha, qaybta kaalmada adeegyada, gabriela stein . So [facility] [phone].    ATENCIÓN: Si habla español, tiene a lin disposición servicios gratuitos de asistencia lingüística. [facility] [phone].    We comply with applicable federal civil rights laws and [place] laws. We do not discriminate on the basis of race, color, national origin, age, disability, sex, sexual orientation, or gender identity.            Thank you!     [facility]. Our goal is always to provide you with excellent care. Hearing back from our patients is one way we can continue to improve our services. Please take a few minutes to complete the written survey that you may receive in the mail after your visit with us. Thank you!             Your Updated Medication List - Protect others around you: Learn how to safely use, store and throw away your medicines at www.disposemymeds.org.          This list is accurate as of: 11/6/17 11:00 AM.  Always use your most recent med list.                   Brand Name Dispense Instructions for use Diagnosis    blood glucose calibration  solution    no brand specified    1 each    Use to calibrate blood glucose monitor as directed.    Prediabetes       blood glucose monitoring meter device kit     1 kit    Use to test blood sugar 1 times daily or as directed.    Prediabetes       blood glucose monitoring test strip    no brand specified    100 each    Use to test blood sugars 1 times daily or as directed    Prediabetes       CHOLEST OFF PO           * FLUoxetine 10 MG capsule    PROzac    90 capsule    Take 1 capsule (10 mg) by mouth daily Take w/ 20 mg cap for total 30 mg daily    Major depressive disorder, recurrent episode, moderate (H)       * FLUoxetine 20 MG capsule    PROzac    90 capsule    Take 1 capsule (20 mg) by mouth daily Take w/ 10 mg cap for total 30 mg daily    Major depressive disorder, recurrent episode, moderate (H)       loratadine 10 MG tablet    CLARITIN    90 tablet    Take 1 tablet (10 mg) by mouth daily    Post-nasal drip       Multi-vitamin Tabs tablet     100 tablet    Take 1 tablet by mouth daily        order for DME     1 Package    Equipment being ordered: CPAP supplies. Length of Need: Lifetime    YAKELIN (obstructive sleep apnea)       oxybutynin 10 MG 24 hr tablet    DITROPAN XL    90 tablet    Take 1 tablet (10 mg) by mouth daily    Mixed incontinence urge and stress (male)(female)       thin lancets    NO BRAND SPECIFIED    1 Box    Use to test blood sugar 1 times daily or as directed.    Prediabetes       triamcinolone 0.1 % ointment    KENALOG    30 g    To area behind ear twice daily until rash is clear.    Dermatitis       * Notice:  This list has 2 medication(s) that are the same as other medications prescribed for you. Read the directions carefully, and ask your doctor or other care provider to review them with you.

## 2017-11-06 NOTE — PROGRESS NOTES
[facility] - Rheumatology Clinic Visit     Leti Grover MRN# [id number]   YOB: 1970    Primary care provider: Mary Ellen Wise  Nov 6, 2017          Assessment and Plan:   # Polyarthralgia X > 6 weeks  # Weight loss - resolved  # ANNETTE 1:640 homogenous; dsDNA, THOMPSON panel neg.   # ALT elevation    RF and CCP antibodies are normal.   Sed rate within normal limits   Complements are normal.     Borderline white cell count elevation.   Thyroid test is normal.     Etiology for the polyarthralgia and stiffness is unclear. No evidence of systemic inflammatory autoimmune rheumatic condition. This is not limited her at work or at home. Patient not interested in any chronic medication for this at this time. If her joint pain and stiffness gets worse, meloxicam 15mg PO daily may be tried. This can be done through Dr. Wise's office. However liver enzymes need to be rechecked before chronic NSAID prescription.     The labs, imaging from patient records are reviewed.     I will be back in touch with the patient through mychart/letter when results are available.     Most Recent Immunizations   Administered Date(s) Administered     HEPA 08/12/2009     HepB 05/11/2005     Influenza Vaccine IM 3yrs+ 4 Valent IIV4 09/18/2017     Mantoux 05/11/2005     Rhogam 09/23/2011     TD (ADULT, 7+) 05/11/2005     TDAP Vaccine (Adacel) 08/12/2009       No orders of the defined types were placed in this encounter.    Data Unavailable    There are no discontinued medications.  Current Outpatient Prescriptions   Medication Sig Dispense Refill     FLUoxetine (PROZAC) 10 MG capsule Take 1 capsule (10 mg) by mouth daily Take w/ 20 mg cap for total 30 mg daily 90 capsule 1     FLUoxetine (PROZAC) 20 MG capsule Take 1 capsule (20 mg) by mouth daily Take w/ 10 mg cap for total 30 mg daily 90 capsule 1     triamcinolone (KENALOG) 0.1 % ointment To area behind ear twice daily until rash is clear. 30 g 1     loratadine (CLARITIN) 10 MG  tablet Take 1 tablet (10 mg) by mouth daily 90 tablet 3     blood glucose monitoring (NO BRAND SPECIFIED) test strip Use to test blood sugars 1 times daily or as directed 100 each 3     thin (NO BRAND SPECIFIED) lancets Use to test blood sugar 1 times daily or as directed. 1 Box 3     Plant Sterols and Stanols (CHOLEST OFF PO)        oxybutynin (DITROPAN XL) 10 MG 24 hr tablet Take 1 tablet (10 mg) by mouth daily 90 tablet 3     order for DME Equipment being ordered: CPAP supplies. Length of Need: Lifetime 1 Package 99     blood glucose calibration (NO BRAND SPECIFIED) solution Use to calibrate blood glucose monitor as directed. 1 each 0     blood glucose monitoring (TOM CONTOUR NEXT MONITOR) meter device kit Use to test blood sugar 1 times daily or as directed. 1 kit 0     multivitamin, therapeutic with minerals (MULTI-VITAMIN) TABS Take 1 tablet by mouth daily 100 tablet 3       Jason Mccarty MD  [facility]          Active Problem List:     Patient Active Problem List    Diagnosis Date Noted     History of gestational diabetes 09/12/2016     Priority: Medium     Obesity 03/10/2014     Priority: Medium     Problem list name updated by automated process. Provider to review       Chronic rhinitis 09/20/2013     Priority: Medium     ASCUS favoring dysplasia 12/02/2010     Priority: Medium     8/12/09 pap = ASC-H  10/22/09 COLP = RADHA 1  5/13/10 pap = ASCUS, HPV neg  6/24/10 COLP = RADHA 1  12/2/10 pap = ASCUS, HPV neg  6/3/11 pap = Neg pap and neg hpv screening---r/p pap in 1 yr or at postpartum visit (EDC -12/23/11)  05/10/13 MyChart reminder letter sent. Patient plans to repeat pap in July 2013  09/15/14 Pap= NIL, Neg HPV. 3 yr co-testing       HYPERLIPIDEMIA LDL GOAL <160 02/10/2010     Priority: Medium     YAKELIN (obstructive sleep apnea) 01/28/2010     Priority: Medium     Moderate Major Depression - Jennifer Deactivated 12/27/2009     Priority: Medium     Elevated blood pressure reading without  diagnosis of hypertension 08/28/2008     Priority: Medium     Hyperlipidemia      Priority: Medium     Problem list name updated by automated process. Provider to review              History of Present Illness:     Chief Complaint   Patient presents with     RECHECK       October 6, 2017  Have you ever seen a rheumatologist No Who NA When NA  Joint pain history  Onset: pt states that she has stiff and sore joints  Involved joints: fingers (PIPs), ankles, knees X > 6 weeks (even before she started a new job)  Pain scale:  6/10     Wakes the patient from sleep : No  Morning stiffness:Yes for 5 minutes  Meds used:none      Interim history  Since last visit:  1. Infections - No  2. New symptoms/medical problem - No  3. Any side effects from Rheum medications -NA  3. ER visits/Hospitalizations/surgeries - No  4. Last PCP visit: 9/18/17    More active in the last one month  Wt Readings from Last 4 Encounters:   11/06/17 68.1 kg (150 lb 3.2 oz)   10/09/17 67.9 kg (149 lb 12.8 oz)   09/26/17 68 kg (150 lb)   09/18/17 68.8 kg (151 lb 11.2 oz)    fatigue because of sleep issues per patient    No h/o loss of appetite, fevers, rash, swollen glands  No h/o gout  No family or personal history of psoriasis, ulcerative colitis or chron's disease. No h/o iritis.   Patient denies any raynauds, malar rash, photosensitivity, recurrent mouth ulcers or arterial/venous thrombosis in the past  No h/o persistent shortness of breath, cough, chest pain  No h/o persistent nausea, vomiting, constipation, diarrhea, abdominal pain  No h/o hematochezia, hematuria, hemoptysis, hematemesis  No h/o seizures or strokes  No h/o cancer    November 6, 2017  Have you ever seen a rheumatologist NO  Joint pain history  Onset: Spring 2017  Involved joints: fingers  Pain scale:  5/10     Wakes the patient from sleep : No  Morning stiffness:al the time  Meds used: see med list, no rheum meds     Interim history  Since last visit:  1. Infections - No  2. New  symptoms/medical problem - No  3. Any side effects from Rheum medications - NA  3. ER visits/Hospitalizations/surgeries - No  4. Last PCP visit: 9/18/17    BP Readings from Last 3 Encounters:   11/06/17 124/82   10/09/17 118/84   09/18/17 120/78              Review of Systems:   Complete ROS negative except for symptoms mentioned in the HPI          Past Medical History:     Past Medical History:   Diagnosis Date     Abnormal Pap smear      Cervical dysplasia 10/22/09    RADHA 1     Other and unspecified hyperlipidemia      Preeclampsia      Wounds and injuries      Past Surgical History:   Procedure Laterality Date     NO HISTORY OF SURGERY              Social History:     Social History     Occupational History      [employer]     Social History Main Topics     Smoking status: Never Smoker     Smokeless tobacco: Never Used     Alcohol use No     Drug use: No     Sexual activity: Yes     Partners: Male      Comment:  had a vasectomy          Family History:     Family History   Problem Relation Age of Onset     CANCER Father      skin cancer     DIABETES Other      C.A.D. No family hx of      Breast Cancer No family hx of      Cancer - colorectal No family hx of             Allergies:   No Known Allergies         Medications:     Current Outpatient Prescriptions   Medication Sig Dispense Refill     FLUoxetine (PROZAC) 10 MG capsule Take 1 capsule (10 mg) by mouth daily Take w/ 20 mg cap for total 30 mg daily 90 capsule 1     FLUoxetine (PROZAC) 20 MG capsule Take 1 capsule (20 mg) by mouth daily Take w/ 10 mg cap for total 30 mg daily 90 capsule 1     triamcinolone (KENALOG) 0.1 % ointment To area behind ear twice daily until rash is clear. 30 g 1     loratadine (CLARITIN) 10 MG tablet Take 1 tablet (10 mg) by mouth daily 90 tablet 3     blood glucose monitoring (NO BRAND SPECIFIED) test strip Use to test blood sugars 1 times daily or as directed 100 each 3     thin (NO BRAND  SPECIFIED) lancets Use to test blood sugar 1 times daily or as directed. 1 Box 3     Plant Sterols and Stanols (CHOLEST OFF PO)        oxybutynin (DITROPAN XL) 10 MG 24 hr tablet Take 1 tablet (10 mg) by mouth daily 90 tablet 3     order for DME Equipment being ordered: CPAP supplies. Length of Need: Lifetime 1 Package 99     blood glucose calibration (NO BRAND SPECIFIED) solution Use to calibrate blood glucose monitor as directed. 1 each 0     blood glucose monitoring (TOM CONTOUR NEXT MONITOR) meter device kit Use to test blood sugar 1 times daily or as directed. 1 kit 0     multivitamin, therapeutic with minerals (MULTI-VITAMIN) TABS Take 1 tablet by mouth daily 100 tablet 3            Physical Exam:   Blood pressure 124/82, pulse 70, resp. rate 14, weight 68.1 kg (150 lb 3.2 oz), SpO2 97 %, not currently breastfeeding.  Wt Readings from Last 4 Encounters:   11/06/17 68.1 kg (150 lb 3.2 oz)   10/09/17 67.9 kg (149 lb 12.8 oz)   09/26/17 68 kg (150 lb)   09/18/17 68.8 kg (151 lb 11.2 oz)       Constitutional: well-developed, appearing stated age; cooperative  Psych: nl judgement, orientation, memory, affect.         Data:     CBC RESULTS:   Recent Labs   Lab Test  12/01/11   0605  11/30/11   0855   WBC   --   12.1*   RBC   --   5.01   HGB  13.8  15.2   HCT   --   46.3   MCV   --   92   MCH   --   30.3   MCHC   --   32.8   RDW   --   14.1   PLT   --   177       Creatinine   Date Value Ref Range Status   09/18/2017 0.82 0.52 - 1.04 mg/dL Final   ]    Uric Acid   Date Value Ref Range Status   11/30/2011 3.9 2.5 - 7.0 mg/dL Final   ]    HLA-B27  No results for input(s): T17XXOQEMT, B1 in the last 99677 hours.  RF/CCP  Recent Labs   Lab Test  09/18/17   0903   CCPIGG  1   RHF  <20     ANNETTE/RNP/Sm/SSA/SSB  Recent Labs   Lab Test  09/22/17   0853  05/24/11   0929   RNPIGG  <0.2   --    SMIGG  <0.2   --    SSAIGG  0.6   --    SSBIGG  <0.2   --    SCLIGG  <0.2   --    TREPAB   --   Negative       Jason Mccarty,  MD [facility]

## 2017-11-14 ENCOUNTER — OFFICE VISIT (OUTPATIENT)
Dept: DERMATOLOGY | Facility: CLINIC | Age: 47
End: 2017-11-14
Payer: COMMERCIAL

## 2017-11-14 DIAGNOSIS — B07.8 OTHER VIRAL WARTS: Primary | ICD-10-CM

## 2017-11-14 PROCEDURE — 99213 OFFICE O/P EST LOW 20 MIN: CPT | Performed by: DERMATOLOGY

## 2017-11-14 NOTE — LETTER
11/14/2017      RE: Leti Grover  [address]  [facility] [place] [facility] [address]       DERMATOLOGY CLINIC VISIT NOTE      CHIEF COMPLAINT:  Followup warts.      HISTORY OF PRESENT ILLNESS:  Ms. Grover is a 47-year-old female returning to Dermatology Clinic for ongoing evaluation and treatment of chronic hand warts.  She was last seen in clinic on 08/29/2017.  Since that time, she has noted no improvement in her warts.      DERMATOLOGY PROBLEM LIST:   1.  Hand warts for 5-10 years, status post biopsy, Candida antigen x3, home salicylic acid, home 5-fluoroucil, cryotherapy monthly. Squaric acid prescribed but cost prohibitive. Investigating PDL.   2.  Actinic keratosis on the nose, status post Efudex in 03/2017.      FAMILY HISTORY:  Son with hand wart.      SOCIAL HISTORY:  , lives in [place], 2 children.      REVIEW OF SYSTEMS:  Feels well without additional skin concerns.      PHYSICAL EXAMINATION:   [facility] 08/20/2014    GENERAL:  The patient is a healthy-appearing 47-year-old female in no distress.   HEENT:  Conjunctivae are clear.   PULMONARY:  Breathing comfortably on room air.   SKIN:  Exam was focused on the hands.  Examination of the left third dorsal finger as well as the left first finger with approximately 1 cm hyperkeratotic verrucous plaques.      ASSESSMENT AND PLAN:   1.  Verruca vulgaris, recalcitrant to many past treatment modalities.Unable to get squaric acid due to cost and Wart Peel also too expensive. Recommending alternating sal acid at home with 5FU. Next steps could include imiquimod or PDL. I will contact patient with coverage details.      Mary Ellen Moyer MD  Pediatric Dermatology Staff

## 2017-11-14 NOTE — PROGRESS NOTES
DERMATOLOGY CLINIC VISIT NOTE      CHIEF COMPLAINT:  Followup warts.      HISTORY OF PRESENT ILLNESS:  Ms. Grover is a 47-year-old female returning to Dermatology Clinic for ongoing evaluation and treatment of chronic hand warts.  She was last seen in clinic on 08/29/2017.  Since that time, she has noted no improvement in her warts.      DERMATOLOGY PROBLEM LIST:   1.  Hand warts for 5-10 years, status post biopsy, Candida antigen x3, home salicylic acid, home 5-fluoroucil, cryotherapy monthly. Squaric acid prescribed but cost prohibitive. Investigating PDL.   2.  Actinic keratosis on the nose, status post Efudex in 03/2017.      FAMILY HISTORY:  Son with hand wart.      SOCIAL HISTORY:  , lives in [place], 2 children.      REVIEW OF SYSTEMS:  Feels well without additional skin concerns.      PHYSICAL EXAMINATION:   [facility] 08/20/2014    GENERAL:  The patient is a healthy-appearing 47-year-old female in no distress.   HEENT:  Conjunctivae are clear.   PULMONARY:  Breathing comfortably on room air.   SKIN:  Exam was focused on the hands.  Examination of the left third dorsal finger as well as the left first finger with approximately 1 cm hyperkeratotic verrucous plaques.      ASSESSMENT AND PLAN:   1.  Verruca vulgaris, recalcitrant to many past treatment modalities.Unable to get squaric acid due to cost and Wart Peel also too expensive. Recommending alternating sal acid at home with 5FU. Next steps could include imiquimod or PDL. I will contact patient with coverage details.      Mary Ellen Moyer MD  Pediatric Dermatology Staff

## 2017-11-14 NOTE — NURSING NOTE
"Chief Complaint   Patient presents with     RECHECK     wart f/u-hasn't been doing much but will start tx again, they have gotten flatter, the ear getting better doing tx on and off        Initial LMP 08/20/2014 Estimated body mass index is 27.47 kg/(m^2) as calculated from the following:    Height as of 10/9/17: 5' 2\" (157.5 cm).    Weight as of 11/6/17: 150 lb 3.2 oz (68.1 kg).  Medication Reconciliation: complete   Jessica Zabala MA      "

## 2017-11-14 NOTE — PATIENT INSTRUCTIONS
Pediatric Dermatology  [facility]  [address]  1st [place]  [address]  Phone: [phone]    Pediatric & Adult Dermatology  [facility]  [address] [facility]   2nd Floor  [facility] [address]  [phone]                  General information: Dr. Mary Ellen Moyer is a board-certified dermatologist with subspecialty certification in pediatric dermatology.     Scheduling and Nurse Triage: Dr. Moyer sees pediatric patients on Mondays in [place] and adult and pediatric patients on Tuesdays in [place]. The remainder of the week she practices at the [facility]. Please call the above phone numbers to schedule or to talk to a nurse.     -For scheduling at the [place] or [place] locations, or to talk to the triage nurse please call the above phone number at the clinic where you were seen.     -For medication refills, please call your pharmacy.

## 2017-11-18 ENCOUNTER — HEALTH MAINTENANCE LETTER (OUTPATIENT)
Age: 47
End: 2017-11-18

## 2017-11-29 ENCOUNTER — TELEPHONE (OUTPATIENT)
Dept: DERMATOLOGY | Facility: CLINIC | Age: 47
End: 2017-11-29

## 2017-11-29 NOTE — TELEPHONE ENCOUNTER
LVM for Leti to schedule appointment, see note from provider below.      normally see Leti in [place], but I would like to use the pulsed dye laser for her warts at the [facility]. Please call and schedule her. OK to overbook.

## 2017-12-07 ENCOUNTER — RESULT FOLLOW UP (OUTPATIENT)
Dept: OBGYN | Facility: CLINIC | Age: 47
End: 2017-12-07

## 2017-12-07 ENCOUNTER — OFFICE VISIT (OUTPATIENT)
Dept: OBGYN | Facility: CLINIC | Age: 47
End: 2017-12-07
Payer: COMMERCIAL

## 2017-12-07 VITALS
SYSTOLIC BLOOD PRESSURE: 104 MMHG | WEIGHT: 153 LBS | HEART RATE: 64 BPM | DIASTOLIC BLOOD PRESSURE: 70 MMHG | BODY MASS INDEX: 27.98 KG/M2

## 2017-12-07 DIAGNOSIS — R87.611 PAPANICOLAOU SMEAR OF CERVIX WITH ATYPICAL SQUAMOUS CELLS CANNOT EXCLUDE HIGH GRADE SQUAMOUS INTRAEPITHELIAL LESION (ASC-H): ICD-10-CM

## 2017-12-07 DIAGNOSIS — Z01.419 ENCOUNTER FOR GYNECOLOGICAL EXAMINATION WITHOUT ABNORMAL FINDING: Primary | ICD-10-CM

## 2017-12-07 DIAGNOSIS — N95.1 CLIMACTERIC: ICD-10-CM

## 2017-12-07 PROCEDURE — G0124 SCREEN C/V THIN LAYER BY MD: HCPCS | Performed by: OBSTETRICS & GYNECOLOGY

## 2017-12-07 PROCEDURE — G0476 HPV COMBO ASSAY CA SCREEN: HCPCS | Performed by: OBSTETRICS & GYNECOLOGY

## 2017-12-07 PROCEDURE — G0145 SCR C/V CYTO,THINLAYER,RESCR: HCPCS | Performed by: OBSTETRICS & GYNECOLOGY

## 2017-12-07 PROCEDURE — 99396 PREV VISIT EST AGE 40-64: CPT | Performed by: OBSTETRICS & GYNECOLOGY

## 2017-12-07 RX ORDER — ESTRADIOL 0.05 MG/D
1 PATCH, EXTENDED RELEASE TRANSDERMAL
Qty: 24 PATCH | Refills: 3 | Status: SHIPPED | OUTPATIENT
Start: 2017-12-07 | End: 2018-02-08 | Stop reason: ALTCHOICE

## 2017-12-07 NOTE — PROGRESS NOTES
HPI: Leti Grover is a 47 year old female Obstetric History      T1  P1  TAB0  SAB0  E0  M0  L2    Patient's last menstrual period was 2014. who presents for annual health maintenance.        Postmenopausal from ; denies significant hot flashes, night sweats, nor vaginal dryness.      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    Is trying to eat healthy and often uses her [employer] discount to eat from the menu, trying to select healthy foods.  States she is not exercising significantly at this time.    Mammogram completed ; declines mammogram this year, will plan next year.  Routine labs completed this year including hemoglobin A1c and lipid panel.        PGYN: +history of chlamydia--remote; denies other STDs.  Pap: ASCUS neg hPV , neg pap, neg HPV .  Normal Pap/negative HPV .    Past Medical History:   Diagnosis Date     Abnormal Pap smear      Cervical dysplasia 10/22/09    RADHA 1     Other and unspecified hyperlipidemia      Preeclampsia      Wounds and injuries          Past Surgical History:   Procedure Laterality Date     NO HISTORY OF SURGERY           Family History   Problem Relation Age of Onset     CANCER Father      skin cancer     DIABETES Other      C.A.D. No family hx of      Breast Cancer No family hx of      Cancer - colorectal No family hx of          Social History     Social History     Marital status:      Spouse name: Peyman     Number of children: 2     Years of education: 14     Occupational History      Homemaker           PCA for seniors     Social History Main Topics     Smoking status: Never Smoker     Smokeless tobacco: Never Used     Alcohol use No     Drug use: No     Sexual activity: Yes     Partners: Male      Comment:  had a vasectomy     Other Topics Concern     Parent/Sibling W/ Cabg, Mi Or Angioplasty Before 65f 55m? No     Social History  Narrative       ROS:  CONSTITUTIONAL:NEGATIVE for fever, chills, change in weight  INTEGUMENTARY/SKIN: NEGATIVE for worrisome rashes, moles or lesions  RESP:NEGATIVE for significant cough or SOB  BREAST: NEGATIVE for masses, tenderness or discharge  CV: NEGATIVE for chest pain, palpitations or peripheral edema  GI: NEGATIVE for nausea, abdominal pain, heartburn, or change in bowel habits  MUSCULOSKELETAL: NEGATIVE for significant arthralgias or myalgia  PSYCHIATRIC: NEGATIVE for changes in mood or affect         Physical exam:   GENERAL APPEARANCE: healthy, alert and no distress  NECK: no adenopathy, no asymmetry, masses, or scars and thyroid normal to palpation  RESP: lungs clear to auscultation - no rales, rhonchi or wheezes  CV: regular rates and rhythm, normal S1 S2, no S3 or S4 and no murmur, click or rub -  Breast: No axillary lymphadenopathy, no lumps bilaterally no nipple discharge.  Normal skin overlying bilaterally.  ABDOMEN:  soft, nontender, no HSM or masses and bowel sounds normal  PELVIC:   Vulva: normal external female genitalia,   Urethra meatus: normal, non-tender  Vagina: normal vaginal mucosa, rugated, no lesions, normal physiologic discharge.    Cervix: multiparous cervix, no lesions.    Uterus: Normal contour, size, position; non-tender  Adnexa: No adnexal masses palpated, non-tender  Perineum: normal, no lesions, intact  Anus: normal, no lesions, hemorrhoids          Assessment/Plan:  (Z01.419) Encounter for gynecological examination without abnormal finding  (primary encounter diagnosis)  Comment: Discussed healthy eating; encouraged emphasis on whole food, plant-based eating, minimizing intake of animal protein and dairy as tolerated.  Encouraged regular exercise; discussed recommendations for moderate intensity exercise, 150 minutes per week.    We discussed combining cardiovascular and strengthening exercises.  Plan: Follow-up in 1 year    (N95.1) Climacteric  Comment: We discussed options  for hormone replacement, including potential benefits, risks, timing hypothesis.  Patient desires to initiate.  We discussed dosage and route of administration, and potential side effects.  Plan: progesterone (PROMETRIUM) 100 MG capsule,         estradiol (VIVELLE-DOT) 0.05 MG/24HR BIW Patch        Follow-up as needed           This encounter was dictated using voice-recognition software; undetected errors may be present.    Alla Garner M.D.

## 2017-12-07 NOTE — LETTER
January 25, 2019      Leti ROXANNE Lenore  [address] [address] [place], [place] [address]  [facility] [address]    Dear ,      At [facility], your health and wellness is our primary concern. That is why we are following up on a colposcopy from 02/08/18. Your provider had recommended that you have a Pap smear and HPV test completed by 02/08/19. Our records do not show that this has been scheduled.    It is important to complete the follow up that your provider has suggested for you to ensure that there are no worsening changes which may, over time, develop into cancer.      Please contact our office at  [phone] to schedule an appointment for a Pap smear and HPV test at your earliest convenience. If you have questions or concerns, please call the clinic and we will be happy to assist you.    If you have completed the tests outside of [facility], please have the results forwarded to our office. We will update the chart for your primary Physician to review before your next annual physical.     Thank you for choosing [facility]!    Sincerely,      Dr. Mary Ellen Wise/[facility]

## 2017-12-07 NOTE — MR AVS SNAPSHOT
After Visit Summary   12/7/2017    Leti Grover    MRN: [id number]           Patient Information     Date Of Birth          1970        Visit Information        Provider Department      12/7/2017 8:45 AM Alla Garner MD [facility]        Today's Diagnoses     Encounter for gynecological examination without abnormal finding    -  1    Climacteric           Follow-ups after your visit        Your next 10 appointments already scheduled     Dec 08, 2017  8:00 AM CST   (Arrive by 7:45 AM)   New Patient Visit with Mary Ellen Moyer MD   [facility] ([facility])    [address] [place] [address]  3rd [facility] [address] [phone]              Who to contact     If you have questions or need follow up information about today's clinic visit or your schedule please contact [facility] directly at [phone].  Normal or non-critical lab and imaging results will be communicated to you by MyChart, letter or phone within 4 business days after the clinic has received the results. If you do not hear from us within 7 days, please contact the clinic through MyChart or phone. If you have a critical or abnormal lab result, we will notify you by phone as soon as possible.  Submit refill requests through [employer] or call your pharmacy and they will forward the refill request to us. Please allow 3 business days for your refill to be completed.          Additional Information About Your Visit        MyChart Information     [employer] gives you secure access to your electronic health record. If you see a primary care provider, you can also send messages to your care team and make appointments. If you have questions, please call your primary care clinic.  If you do not have a primary care provider, please call [phone] and they will assist you.        Care EveryWhere ID     This is your Care EveryWhere ID. This could be used by other  organizations to access your [facility] medical records  [id number]        Your Vitals Were     Pulse Last Period BMI (Body Mass Index)             64 08/20/2014 27.98 kg/m2          Blood Pressure from Last 3 Encounters:   12/07/17 104/70   11/06/17 124/82   10/09/17 118/84    Weight from Last 3 Encounters:   12/07/17 153 lb (69.4 kg)   11/06/17 150 lb 3.2 oz (68.1 kg)   10/09/17 149 lb 12.8 oz (67.9 kg)              Today, you had the following     No orders found for display         Today's Medication Changes          These changes are accurate as of: 12/7/17  9:48 AM.  If you have any questions, ask your nurse or doctor.               Start taking these medicines.        Dose/Directions    estradiol 0.05 MG/24HR BIW patch   Commonly known as:  VIVELLE-DOT   Used for:  Climacteric   Started by:  Alla Garner MD        Dose:  1 patch   Place 1 patch onto the skin twice a week   Quantity:  24 patch   Refills:  3       progesterone 100 MG capsule   Commonly known as:  PROMETRIUM   Used for:  Climacteric   Started by:  Alla Garner MD        Dose:  100 mg   Take 1 capsule (100 mg) by mouth daily   Quantity:  90 capsule   Refills:  3            Where to get your medicines      These medications were sent to [facility] [address] [place] [address] [address] [place] [address] [address] AT [facility] [address]  [address] [facility] [address], [facility] [address]     Phone:  [phone]     estradiol 0.05 MG/24HR BIW patch    progesterone 100 MG capsule                Primary Care Provider Office Phone # Fax #    Mary Ellen Wise [phone] [address] [place] [address]        Equal Access to Services     LEONID RAMIREZ AH: Tanmay Tompkins, stephanie helms, lizbeth xie, gabriela velazquez. So [facility] [phone].    ATENCIÓN: Si habla español, tiene a lin disposición servicios gratuitos de asistencia lingüística. Llame al  [phone].    We comply with applicable federal civil rights laws and [place] laws. We do not discriminate on the basis of race, color, national origin, age, disability, sex, sexual orientation, or gender identity.            Thank you!     Thank you for choosing [facility]  for your care. Our goal is always to provide you with excellent care. Hearing back from our patients is one way we can continue to improve our services. Please take a few minutes to complete the written survey that you may receive in the mail after your visit with us. Thank you!             Your Updated Medication List - Protect others around you: Learn how to safely use, store and throw away your medicines at www.disposemymeds.org.          This list is accurate as of: 12/7/17  9:48 AM.  Always use your most recent med list.                   Brand Name Dispense Instructions for use Diagnosis    blood glucose calibration solution    no brand specified    1 each    Use to calibrate blood glucose monitor as directed.    Prediabetes       blood glucose monitoring meter device kit     1 kit    Use to test blood sugar 1 times daily or as directed.    Prediabetes       blood glucose monitoring test strip    no brand specified    100 each    Use to test blood sugars 1 times daily or as directed    Prediabetes       CHOLEST OFF PO           estradiol 0.05 MG/24HR BIW patch    VIVELLE-DOT    24 patch    Place 1 patch onto the skin twice a week    Climacteric       * FLUoxetine 10 MG capsule    PROzac    90 capsule    Take 1 capsule (10 mg) by mouth daily Take w/ 20 mg cap for total 30 mg daily    Major depressive disorder, recurrent episode, moderate (H)       * FLUoxetine 20 MG capsule    PROzac    90 capsule    Take 1 capsule (20 mg) by mouth daily Take w/ 10 mg cap for total 30 mg daily    Major depressive disorder, recurrent episode, moderate (H)       loratadine 10 MG tablet    CLARITIN    90 tablet    Take 1 tablet (10 mg) by  mouth daily    Post-nasal drip       Multi-vitamin Tabs tablet     100 tablet    Take 1 tablet by mouth daily        order for DME     1 Package    Equipment being ordered: CPAP supplies. Length of Need: Lifetime    YAKELIN (obstructive sleep apnea)       oxybutynin 10 MG 24 hr tablet    DITROPAN XL    90 tablet    Take 1 tablet (10 mg) by mouth daily    Mixed incontinence urge and stress (male)(female)       progesterone 100 MG capsule    PROMETRIUM    90 capsule    Take 1 capsule (100 mg) by mouth daily    Climacteric       thin lancets    NO BRAND SPECIFIED    1 Box    Use to test blood sugar 1 times daily or as directed.    Prediabetes       triamcinolone 0.1 % ointment    KENALOG    30 g    To area behind ear twice daily until rash is clear.    Dermatitis       * Notice:  This list has 2 medication(s) that are the same as other medications prescribed for you. Read the directions carefully, and ask your doctor or other care provider to review them with you.

## 2017-12-07 NOTE — NURSING NOTE
"Chief Complaint   Patient presents with     Gyn Exam     pap smear only - last pap 09/15/14       Initial /70 (BP Location: Right arm, Patient Position: Chair, Cuff Size: Adult Regular)  Pulse 64  Wt 153 lb (69.4 kg)  LMP 08/20/2014  BMI 27.98 kg/m2 Estimated body mass index is 27.98 kg/(m^2) as calculated from the following:    Height as of 10/9/17: 5' 2\" (1.575 m).    Weight as of this encounter: 153 lb (69.4 kg).  Medication Reconciliation: complete     Nurse assisted visit.  Marina Steele MA.      "

## 2017-12-08 ENCOUNTER — OFFICE VISIT (OUTPATIENT)
Dept: DERMATOLOGY | Facility: CLINIC | Age: 47
End: 2017-12-08

## 2017-12-08 DIAGNOSIS — L30.9 CHRONIC DERMATITIS: Primary | ICD-10-CM

## 2017-12-08 DIAGNOSIS — B07.8 OTHER VIRAL WARTS: ICD-10-CM

## 2017-12-08 RX ORDER — FLUOCINONIDE 0.5 MG/G
OINTMENT TOPICAL
Qty: 30 G | Refills: 2 | Status: SHIPPED | OUTPATIENT
Start: 2017-12-08 | End: 2019-10-01

## 2017-12-08 ASSESSMENT — PAIN SCALES - GENERAL
PAINLEVEL: NO PAIN (1)
PAINLEVEL: NO PAIN (0)

## 2017-12-08 NOTE — PATIENT INSTRUCTIONS
Pulse Dye Laser    I will experience redness, swelling, pain, and heat sensation. I may experience bruising, itching, or acne. Risks are blistering, oozing, permanent scarring, hair loss,  temporary or permanent skin lightening or darkening, infection, and eye injury. I understand my outcome could be no improvement, slight improvement. Multiple treatments may be required.    After treatment, Do Not:    Rub, scratch, or put weight on the site for 2 weeks    Wear tight fitting clothing or jewelry over the site    Osman. Keep the site out of sunlight. Use sunscreen of 30 SPF or greater when in the sun. Use sunscreen 30 minutes before going out and reapply if sweating. Tanning decreases the success of the treatment     How do I care for the treated site?    Use ice packs for 10 minutes after the procedure for swelling     If the site is on your face, use ice again 1 hour after treatment    If a scab or crust forms, gently cleanse the site with hydrogen peroxide. Then put on Vaseline  ointment 3 times a day    Do not use makeup on any open wound    What should I expect?    Blue-gray color that may take 2 to 3 weeks to go away    Redness may also last a week or longer    Results may take up to 3 or 4 months after treatment    More procedures may be needed    Who should I call with questions?    [facility]: [phone]     [facility]: [phone]    For urgent needs outside of business hours call the [facility] at [phone] and ask for the dermatology resident on call

## 2017-12-08 NOTE — PROGRESS NOTES
[facility]   Dermatologic Laser Surgery   Operative Report       Patient Name:  Leti Grover  Patient :  1970  Medical Record #: [id number]  Date of Operation: 2017    Past Medical History:   Diagnosis Date     Abnormal Pap smear      Cervical dysplasia 10/22/09    RADHA 1     Other and unspecified hyperlipidemia      Preeclampsia      Wounds and injuries      LMP 2014    DERMATOLOGY PROBLEM LIST:   1.  Hand warts for 5-10 years, status post biopsy, Candida antigen x3, home salicylic acid, home 5-fluoroucil, cryotherapy monthly. Squaric acid prescribed but cost prohibitive. Investigating PDL.   2.  Actinic keratosis on the nose, status post Efudex in 2017.   3. Dermatitis of L post-auricular scalp.       SURGEON:  Mary Ellen Moyer MD    ASSISTANT:  Michelle Ramirez    PREOPERATIVE DIAGNOSIS:  Recalcitrant warts    POSTOPERATIVE DIAGNOSIS:  Same    PROCEDURE PERFORMED:  Pulse dye laser    PROCEDURE:   After discussion of risks and benefits of the procedure including the presence of scar or bruising following the procedure, written consent was obtained from patient, and the patient was taken to the procedure room.  Appropriate eyewear in place for all in attendance.  The lesion on the hands were delineated by a marking pen.     SITE: L 1st and 3rd fingers   SPOT SIZE: 5 mm  FLUENCE: 9.5 J  PULSE DURATION: 1.5 ms  PULSE NUMBER: 28 pulses  TOTAL AREA TREATED: <5 sq cm.  NOTES:   Ice pack applied after procedure and while in recovery.     There were no complications to the procedure or abnormal findings.    Lidex ointment for post auricular scalp dermatitis.   Follow up in 1 month.

## 2017-12-08 NOTE — MR AVS SNAPSHOT
After Visit Summary   12/8/2017    Leti Grover    MRN: [id number]           Patient Information     Date Of Birth          1970        Visit Information        Provider Department      12/8/2017 8:00 AM Mary Ellen Moyer MD [facility]        Care Instructions    Pulse Dye Laser    I will experience redness, swelling, pain, and heat sensation. I may experience bruising, itching, or acne. Risks are blistering, oozing, permanent scarring, hair loss,  temporary or permanent skin lightening or darkening, infection, and eye injury. I understand my outcome could be no improvement, slight improvement. Multiple treatments may be required.    After treatment, Do Not:    Rub, scratch, or put weight on the site for 2 weeks    Wear tight fitting clothing or jewelry over the site    Osman. Keep the site out of sunlight. Use sunscreen of 30 SPF or greater when in the sun. Use sunscreen 30 minutes before going out and reapply if sweating. Tanning decreases the success of the treatment     How do I care for the treated site?    Use ice packs for 10 minutes after the procedure for swelling     If the site is on your face, use ice again 1 hour after treatment    If a scab or crust forms, gently cleanse the site with hydrogen peroxide. Then put on Vaseline  ointment 3 times a day    Do not use makeup on any open wound    What should I expect?    Blue-gray color that may take 2 to 3 weeks to go away    Redness may also last a week or longer    Results may take up to 3 or 4 months after treatment    More procedures may be needed    Who should I call with questions?    [facility]: [phone]     [facility]: [phone]    For urgent needs outside of business hours call the [facility] at [phone] and ask for the dermatology resident on call                Follow-ups after your visit        Who to contact     Please call your  clinic at [phone] to:    Ask questions about your health    Make or cancel appointments    Discuss your medicines    Learn about your test results    Speak to your doctor   If you have compliments or concerns about an experience at your clinic, or if you wish to file a complaint, please contact [facility] Patient Relations at [phone] or email us at [email or web address]         Additional Information About Your Visit        MyChart Information     [employer] gives you secure access to your electronic health record. If you see a primary care provider, you can also send messages to your care team and make appointments. If you have questions, please call your primary care clinic.  If you do not have a primary care provider, please call [phone] and they will assist you.      [employer] is an electronic gateway that provides easy, online access to your medical records. With [employer], you can request a clinic appointment, read your test results, renew a prescription or communicate with your care team.     To access your existing account, please contact your [facility] or call [phone] for assistance.        Care EveryWhere ID     This is your Care EveryWhere ID. This could be used by other organizations to access your [facility] medical records  [id number]        Your Vitals Were     Last Period                   08/20/2014            Blood Pressure from Last 3 Encounters:   12/07/17 104/70   11/06/17 124/82   10/09/17 118/84    Weight from Last 3 Encounters:   12/07/17 69.4 kg (153 lb)   11/06/17 68.1 kg (150 lb 3.2 oz)   10/09/17 67.9 kg (149 lb 12.8 oz)              Today, you had the following     No orders found for display       Primary Care Provider Office Phone # Fax #    Mary Ellen Wise [phone] [address] [place] [address]        Equal Access to Services     LEDA GRIFFIN: Tanmay rosen  payal Tompkins, wabartolomeda luqadaha, qabelindata kaab xie, gabriela fitchjayda caroline. So [facility] [phone].    ATENCIÓN: Si habla español, tiene a lin disposición servicios gratuitos de asistencia lingüística. Nava al [phone].    We comply with applicable federal civil rights laws and [place] laws. We do not discriminate on the basis of race, color, national origin, age, disability, sex, sexual orientation, or gender identity.            Thank you!     Thank you for choosing [facility]  for your care. Our goal is always to provide you with excellent care. Hearing back from our patients is one way we can continue to improve our services. Please take a few minutes to complete the written survey that you may receive in the mail after your visit with us. Thank you!             Your Updated Medication List - Protect others around you: Learn how to safely use, store and throw away your medicines at www.disposemymeds.org.          This list is accurate as of: 12/8/17  8:22 AM.  Always use your most recent med list.                   Brand Name Dispense Instructions for use Diagnosis    blood glucose calibration solution    no brand specified    1 each    Use to calibrate blood glucose monitor as directed.    Prediabetes       blood glucose monitoring meter device kit     1 kit    Use to test blood sugar 1 times daily or as directed.    Prediabetes       blood glucose monitoring test strip    no brand specified    100 each    Use to test blood sugars 1 times daily or as directed    Prediabetes       CHOLEST OFF PO           estradiol 0.05 MG/24HR BIW patch    VIVELLE-DOT    24 patch    Place 1 patch onto the skin twice a week    Climacteric       * FLUoxetine 10 MG capsule    PROzac    90 capsule    Take 1 capsule (10 mg) by mouth daily Take w/ 20 mg cap for total 30 mg daily    Major depressive disorder, recurrent episode, moderate (H)       * FLUoxetine 20 MG capsule    PROzac    90  capsule    Take 1 capsule (20 mg) by mouth daily Take w/ 10 mg cap for total 30 mg daily    Major depressive disorder, recurrent episode, moderate (H)       loratadine 10 MG tablet    CLARITIN    90 tablet    Take 1 tablet (10 mg) by mouth daily    Post-nasal drip       Multi-vitamin Tabs tablet     100 tablet    Take 1 tablet by mouth daily        order for DME     1 Package    Equipment being ordered: CPAP supplies. Length of Need: Lifetime    YAKELIN (obstructive sleep apnea)       oxybutynin 10 MG 24 hr tablet    DITROPAN XL    90 tablet    Take 1 tablet (10 mg) by mouth daily    Mixed incontinence urge and stress (male)(female)       progesterone 100 MG capsule    PROMETRIUM    90 capsule    Take 1 capsule (100 mg) by mouth daily    Climacteric       thin lancets    NO BRAND SPECIFIED    1 Box    Use to test blood sugar 1 times daily or as directed.    Prediabetes       triamcinolone 0.1 % ointment    KENALOG    30 g    To area behind ear twice daily until rash is clear.    Dermatitis       * Notice:  This list has 2 medication(s) that are the same as other medications prescribed for you. Read the directions carefully, and ask your doctor or other care provider to review them with you.

## 2017-12-08 NOTE — LETTER
2017       RE: Leti Grover  [id number]  [facility] [address]     Dear Colleague,    Thank you for referring your patient, Leti Grover, to the [facility] DERMATOLOGY at [facility]. Please see a copy of my visit note below.    [facility]   Dermatologic Laser Surgery   Operative Report       Patient Name:  Leti Grover  Patient :  1970  Medical Record #: [id number]  Date of Operation: 2017    Past Medical History:   Diagnosis Date     Abnormal Pap smear      Cervical dysplasia 10/22/09    RADHA 1     Other and unspecified hyperlipidemia      Preeclampsia      Wounds and injuries      LMP 2014    DERMATOLOGY PROBLEM LIST:   1.  Hand warts for 5-10 years, status post biopsy, Candida antigen x3, home salicylic acid, home 5-fluoroucil, cryotherapy monthly. Squaric acid prescribed but cost prohibitive. Investigating PDL.   2.  Actinic keratosis on the nose, status post Efudex in 2017.   3. Dermatitis of L post-auricular scalp.       SURGEON:  Mary Ellen Moyer MD    ASSISTANT:  Michelle Ramirez    PREOPERATIVE DIAGNOSIS:  Recalcitrant warts    POSTOPERATIVE DIAGNOSIS:  Same    PROCEDURE PERFORMED:  Pulse dye laser    PROCEDURE:   After discussion of risks and benefits of the procedure including the presence of scar or bruising following the procedure, written consent was obtained from patient, and the patient was taken to the procedure room.  Appropriate eyewear in place for all in attendance.  The lesion on the hands were delineated by a marking pen.     SITE: L 1st and 3rd fingers   SPOT SIZE: 5 mm  FLUENCE: 9.5 J  PULSE DURATION: 1.5 ms  PULSE NUMBER: 28 pulses  TOTAL AREA TREATED: <5 sq cm.  NOTES:   Ice pack applied after procedure and while in recovery.     There were no complications to the procedure or abnormal findings.    Lidex ointment for post auricular scalp dermatitis.   Follow up in 1 month.       Sincerely,    Mary Ellen Moyer MD

## 2017-12-08 NOTE — NURSING NOTE
Dermatology Rooming Note    Leti Grover's goals for this visit include:   Chief Complaint   Patient presents with     Derm Problem     Leti comes to clinic to have her hand warts treated with PDL.     Michelle Ramirez LPN

## 2017-12-12 LAB
COPATH REPORT: ABNORMAL
PAP: ABNORMAL

## 2017-12-13 LAB
FINAL DIAGNOSIS: NORMAL
HPV HR 12 DNA CVX QL NAA+PROBE: NEGATIVE
HPV16 DNA SPEC QL NAA+PROBE: NEGATIVE
HPV18 DNA SPEC QL NAA+PROBE: NEGATIVE
SPECIMEN DESCRIPTION: NORMAL

## 2017-12-14 NOTE — PROGRESS NOTES
8/12/09 pap = ASC-H  10/22/09 COLP = RADHA 1  5/13/10 pap = ASCUS, HPV neg  6/24/10 COLP = RADHA 1  12/2/10 pap = ASCUS, HPV neg  6/3/11 pap = Neg pap and neg hpv screening---r/p pap in 1 yr or at postpartum visit ([facility] -12/23/11)  05/10/13 MyChart reminder letter sent. Patient plans to repeat pap in July 2013  09/15/14 Pap= NIL, Neg HPV. 3 yr co-testing  12/7/17 ASC-H pap, neg HR HPV. Plan colp due by 3/7/18  12/14/17 LM with pt's  to have pt call back. Patient notified of results/recommendations (LN)  2/8/18 Dania- Normal, Plan 1 yr co-test (LN)  01/25/19 Cotest reminder letter sent. ([facility])  03/26/19 TC clinic and schedule. ([facility])  7/8/19 NIL pap, Neg HPV.  Plan: cotest in 1 year ([facility])

## 2018-02-08 ENCOUNTER — OFFICE VISIT (OUTPATIENT)
Dept: OBGYN | Facility: CLINIC | Age: 48
End: 2018-02-08
Payer: COMMERCIAL

## 2018-02-08 VITALS
HEART RATE: 76 BPM | BODY MASS INDEX: 27.67 KG/M2 | SYSTOLIC BLOOD PRESSURE: 104 MMHG | WEIGHT: 151.3 LBS | DIASTOLIC BLOOD PRESSURE: 66 MMHG

## 2018-02-08 DIAGNOSIS — N95.1 SYMPTOMATIC MENOPAUSAL OR FEMALE CLIMACTERIC STATES: ICD-10-CM

## 2018-02-08 DIAGNOSIS — R87.611 PAPANICOLAOU SMEAR OF CERVIX WITH ATYPICAL SQUAMOUS CELLS CANNOT EXCLUDE HIGH GRADE SQUAMOUS INTRAEPITHELIAL LESION (ASC-H): ICD-10-CM

## 2018-02-08 DIAGNOSIS — R87.611 PAP SMEAR OF CERVIX WITH ASCUS, CANNOT EXCLUDE HGSIL: Primary | ICD-10-CM

## 2018-02-08 PROCEDURE — 57454 BX/CURETT OF CERVIX W/SCOPE: CPT | Performed by: OBSTETRICS & GYNECOLOGY

## 2018-02-08 PROCEDURE — 99212 OFFICE O/P EST SF 10 MIN: CPT | Mod: 25 | Performed by: OBSTETRICS & GYNECOLOGY

## 2018-02-08 PROCEDURE — 88305 TISSUE EXAM BY PATHOLOGIST: CPT | Performed by: OBSTETRICS & GYNECOLOGY

## 2018-02-08 RX ORDER — ESTRADIOL 0.5 MG/1
0.5 TABLET ORAL DAILY
Qty: 90 TABLET | Refills: 3 | Status: SHIPPED | OUTPATIENT
Start: 2018-02-08 | End: 2019-01-18

## 2018-02-08 NOTE — MR AVS SNAPSHOT
After Visit Summary   2/8/2018    Leti Grover    MRN: [id number]           Patient Information     Date Of Birth          1970        Visit Information        Provider Department      2/8/2018 8:30 AM Alla Garner MD [facility]        Today's Diagnoses     Pap smear of cervix with ASCUS, cannot exclude HGSIL    -  1    Papanicolaou smear of cervix with atypical squamous cells cannot exclude high grade squamous intraepithelial lesion (ASC-H)        Symptomatic menopausal or female climacteric states           Follow-ups after your visit        Who to contact     If you have questions or need follow up information about today's clinic visit or your schedule please contact [facility] directly at [phone].  Normal or non-critical lab and imaging results will be communicated to you by MyChart, letter or phone within 4 business days after the clinic has received the results. If you do not hear from us within 7 days, please contact the clinic through [employer] or phone. If you have a critical or abnormal lab result, we will notify you by phone as soon as possible.  Submit refill requests through [employer] or call your pharmacy and they will forward the refill request to us. Please allow 3 business days for your refill to be completed.          Additional Information About Your Visit        MyChart Information     [employer] gives you secure access to your electronic health record. If you see a primary care provider, you can also send messages to your care team and make appointments. If you have questions, please call your primary care clinic.  If you do not have a primary care provider, please call [phone] and they will assist you.        Care EveryWhere ID     This is your Care EveryWhere ID. This could be used by other organizations to access your [facility] medical records  [id number]        Your Vitals Were     Pulse Last Period BMI (Body Mass Index)              76 08/20/2014 27.67 kg/m2          Blood Pressure from Last 3 Encounters:   02/08/18 104/66   12/07/17 104/70   11/06/17 124/82    Weight from Last 3 Encounters:   02/08/18 151 lb 4.8 oz (68.6 kg)   12/07/17 153 lb (69.4 kg)   11/06/17 150 lb 3.2 oz (68.1 kg)              We Performed the Following     COLP CERVIX/UPPER VAGINA W BX CERVIX/ENDOCERV CURETT     Surgical pathology exam          Today's Medication Changes          These changes are accurate as of 2/8/18 10:32 AM.  If you have any questions, ask your nurse or doctor.               Start taking these medicines.        Dose/Directions    estradiol 0.5 MG tablet   Commonly known as:  ESTRACE   Used for:  Symptomatic menopausal or female climacteric states   Replaces:  estradiol 0.05 MG/24HR BIW patch   Started by:  Alla Garner MD        Dose:  0.5 mg   Take 1 tablet (0.5 mg) by mouth daily   Quantity:  90 tablet   Refills:  3         Stop taking these medicines if you haven't already. Please contact your care team if you have questions.     estradiol 0.05 MG/24HR BIW patch   Commonly known as:  VIVELLE-DOT   Replaced by:  estradiol 0.5 MG tablet   Stopped by:  Alla Garner MD                Where to get your medicines      These medications were sent to [facility] [address] [place] [address] [address] [facility] [address]  [address], [facility] [address]     Phone:  [phone]     estradiol 0.5 MG tablet                Primary Care Provider Office Phone # Fax #    Mary Ellen Wise [phone] [address] [place] [address]        Equal Access to Services     LEONID RAMIREZ AH: Tanmay Tompkins, stephanie helms, lizbeth xie, gabriela velazquez. So [facility] [phone].    ATENCIÓN: Si habla español, tiene a lin disposición servicios gratuitos de asistencia lingüística. Llame al [phone].    We comply with applicable  federal civil rights laws and [place] laws. We do not discriminate on the basis of race, color, national origin, age, disability, sex, sexual orientation, or gender identity.            Thank you!     Thank you for choosing [facility]  for your care. Our goal is always to provide you with excellent care. Hearing back from our patients is one way we can continue to improve our services. Please take a few minutes to complete the written survey that you may receive in the mail after your visit with us. Thank you!             Your Updated Medication List - Protect others around you: Learn how to safely use, store and throw away your medicines at www.disposemymeds.org.          This list is accurate as of 2/8/18 10:32 AM.  Always use your most recent med list.                   Brand Name Dispense Instructions for use Diagnosis    blood glucose calibration solution    no brand specified    1 each    Use to calibrate blood glucose monitor as directed.    Prediabetes       blood glucose monitoring meter device kit     1 kit    Use to test blood sugar 1 times daily or as directed.    Prediabetes       blood glucose monitoring test strip    no brand specified    100 each    Use to test blood sugars 1 times daily or as directed    Prediabetes       CHOLEST OFF PO           estradiol 0.5 MG tablet    ESTRACE    90 tablet    Take 1 tablet (0.5 mg) by mouth daily    Symptomatic menopausal or female climacteric states       fluocinonide 0.05 % ointment    LIDEX    30 g    Twice daily to rash behind the ears until clear.    Chronic dermatitis       * FLUoxetine 10 MG capsule    PROzac    90 capsule    Take 1 capsule (10 mg) by mouth daily Take w/ 20 mg cap for total 30 mg daily    Major depressive disorder, recurrent episode, moderate (H)       * FLUoxetine 20 MG capsule    PROzac    90 capsule    TAKE 1 CAPSULE BY MOUTH DAILY, TAKE WITH 10MG CAPSULE FOR TOTAL OF 30MG PER DAY AS DIRECTED    Major depressive disorder,  recurrent episode, moderate (H)       loratadine 10 MG tablet    CLARITIN    90 tablet    Take 1 tablet (10 mg) by mouth daily    Post-nasal drip       Multi-vitamin Tabs tablet     100 tablet    Take 1 tablet by mouth daily        order for DME     1 Package    Equipment being ordered: CPAP supplies. Length of Need: Lifetime    YAKELIN (obstructive sleep apnea)       oxybutynin 10 MG 24 hr tablet    DITROPAN XL    90 tablet    Take 1 tablet (10 mg) by mouth daily    Mixed incontinence urge and stress (male)(female)       progesterone 100 MG capsule    PROMETRIUM    90 capsule    Take 1 capsule (100 mg) by mouth daily    Climacteric       thin lancets    NO BRAND SPECIFIED    1 Box    Use to test blood sugar 1 times daily or as directed.    Prediabetes       triamcinolone 0.1 % ointment    KENALOG    30 g    To area behind ear twice daily until rash is clear.    Dermatitis       * Notice:  This list has 2 medication(s) that are the same as other medications prescribed for you. Read the directions carefully, and ask your doctor or other care provider to review them with you.

## 2018-02-08 NOTE — NURSING NOTE
"Chief Complaint   Patient presents with     Colposcopy   c/o 2-3 day episode of vaginal bleeding approx 2 weeks ago    Initial /66  Pulse 76  Wt 151 lb 4.8 oz (68.6 kg)  LMP 2014  BMI 27.67 kg/m2 Estimated body mass index is 27.67 kg/(m^2) as calculated from the following:    Height as of 10/9/17: 5' 2\" (1.575 m).    Weight as of this encounter: 151 lb 4.8 oz (68.6 kg).  BP completed using cuff size: regular        The following HM Due: NONE      The following patient reported/Care Every where data was sent to:  P ABSTRACT QUALITY INITIATIVES [[id number]]  na      n/a              "

## 2018-02-08 NOTE — PROGRESS NOTES
47 year old female  presents for colposcopy.   Pap smear ASCUS-H/neg HPV: 12/7/17  +Previous history of abnormal paps/treatment;   8/12/09 pap = ASC-H  10/22/09 COLP = RADHA 1  5/13/10 pap = ASCUS, HPV neg  6/24/10 COLP = RADHA 1  12/2/10 pap = ASCUS, HPV neg  6/3/11 pap = Neg pap and neg hpv screening---r/p pap in 1 yr or at postpartum visit ([facility] -12/23/11)  05/10/13 MyChart reminder letter sent. Patient plans to repeat pap in July 2013  09/15/14 Pap= NIL, Neg HPV. 3 yr co-testing.    Patient started on HRT with estradiol patch, micronized progesterone in December for relief of menopausal symptoms.   States doing well on regimen, however, had an episode of spotting x 2 days since starting.   Also reports pruritis and skin irritation/erythema at patch sites, which continues for several days after discontinuing each patch.       Patient's last menstrual period was 09/19/2010.  Allergies: Pcn and Sulfa drugs      Procedure for colposcopy and biopsy has been explained to the   patient and consent obtained.    PROCEDURE:  Speculum placed in vagina and excellent visualization of cervix   acheived, cervix swabbed x 3 with acetic acid solution.    FINDINGS:  Cervix: endocervical speculum placed, SCJ visualized - lesion at 3 o'clock (mild AWE without punctations), endocervical curettage performed, cervical biopsies taken at 3 o'clock, specimen labelled and sent to pathology and hemostasis achieved with Monsel's solution.    Vaginal inspection: vaginal colposcopy not performed.    Vulvar colposcopy: vulvar colposcopy not performed.    Procedure Summary: Patient tolerated procedure well and colposcopy adequate.    ASSESSMENT:   RADHA 1or less.    (N95.1) Symptomatic menopausal or female climacteric states  Comment: will switch to PO estradiol, discontinue patch.   Will increase micronized progesterone for postmenopausal bleeding; given recent initiation of regimen, likely benign.   Recommend follow-up for any persistent bleeding  for pelvic ultrasound, biopsy as appropriate.  Plan: estradiol (ESTRACE) 0.5 MG tablet                   PLAN: Specimens labelled and sent to pathology., Will base further treatment on pathology findings., treatment options discussed with patient and post biopsy instructions given to patient.

## 2018-02-09 LAB — COPATH REPORT: NORMAL

## 2018-02-22 DIAGNOSIS — N39.46 MIXED INCONTINENCE URGE AND STRESS (MALE)(FEMALE): ICD-10-CM

## 2018-02-22 NOTE — TELEPHONE ENCOUNTER
"Requested Prescriptions   Pending Prescriptions Disp Refills     oxybutynin (DITROPAN XL) 10 MG 24 hr tablet    Last Written Prescription Date:  1/27/2017  Last Fill Quantity: 90,  # refills: 3   Last office visit: 9/18/2017 with prescribing provider:  Mary Ellen Wise     Future Office Visit:     90 tablet 3     Sig: Take 1 tablet (10 mg) by mouth daily    Muscarinic Antagonists (Urinary Incontinence Agents) Passed    2/22/2018 10:20 AM       Passed - Recent or future visit with authorizing provider's specialty    Patient had office visit in the last year or has a visit in the next 30 days with authorizing provider.  See \"Patient Info\" tab in inbasket, or \"Choose Columns\" in Meds & Orders section of the refill encounter.            Passed - Medication is Oxybutynin and patient is 5 years of age or older       Passed - Patient does not have a diagnosis of glaucoma on the problem list    If glaucoma diagnosis is new, refer refill to physician.         Passed - Patient is 18 years of age or older        \  "

## 2018-02-26 RX ORDER — OXYBUTYNIN CHLORIDE 10 MG/1
10 TABLET, EXTENDED RELEASE ORAL DAILY
Qty: 90 TABLET | Refills: 3 | Status: SHIPPED | OUTPATIENT
Start: 2018-02-26 | End: 2019-01-31

## 2018-05-15 DIAGNOSIS — R09.82 POST-NASAL DRIP: ICD-10-CM

## 2018-05-15 NOTE — TELEPHONE ENCOUNTER
"Requested Prescriptions   Pending Prescriptions Disp Refills     loratadine (CLARITIN) 10 MG tablet [Pharmacy Med Name: LORATADINE 10MG TABLETS]    Last Written Prescription Date:  5/30/2017  Last Fill Quantity: 90,  # refills: 3   Last office visit: 9/18/2017 with prescribing provider:  Mary Ellen Wise     Future Office Visit:     90 tablet 0     Sig: TAKE 1 TABLET(10 MG) BY MOUTH DAILY    Antihistamines Protocol Passed    5/15/2018  3:21 AM       Passed - Patient is 3-64 years of age    Apply weight-based dosing for peds patients age 3 - 12 years of age.    Forward request to provider for patients under the age of 3 or over the age of 64.         Passed - Recent (12 mo) or future (30 days) visit within the authorizing provider's specialty    Patient had office visit in the last 12 months or has a visit in the next 30 days with authorizing provider or within the authorizing provider's specialty.  See \"Patient Info\" tab in inbasket, or \"Choose Columns\" in Meds & Orders section of the refill encounter.              "

## 2018-05-16 RX ORDER — LORATADINE 10 MG/1
TABLET ORAL
Qty: 90 TABLET | Refills: 0 | Status: SHIPPED | OUTPATIENT
Start: 2018-05-16 | End: 2018-08-10

## 2018-07-05 ENCOUNTER — MYC MEDICAL ADVICE (OUTPATIENT)
Dept: PEDIATRICS | Facility: CLINIC | Age: 48
End: 2018-07-05

## 2018-07-05 NOTE — TELEPHONE ENCOUNTER
Pt sent a [employer] message.  Informed pt to scheule an appt for abnormal vaginal bleeding and/ or call OB-Gyn provider.    Olya Bermudez RN

## 2018-07-07 DIAGNOSIS — F33.1 MAJOR DEPRESSIVE DISORDER, RECURRENT EPISODE, MODERATE (H): ICD-10-CM

## 2018-07-07 NOTE — TELEPHONE ENCOUNTER
"Requested Prescriptions   Pending Prescriptions Disp Refills     FLUoxetine (PROZAC) 10 MG capsule [Pharmacy Med Name: FLUOXETINE 10MG CAPSULES]  Last Written Prescription Date:  10/24/17  Last Fill Quantity: 90,  # refills: 1   Last office visit: 9/18/2017 with prescribing provider:  9/18/17   Future Office Visit:     90 capsule 0     Sig: TAKE ONE CAPSULE BY MOUTH EVERY DAY WITH 20 MG FOR TOTAL OF 30 MG EVERY DAY    SSRIs Protocol Failed    7/7/2018  3:20 AM       Failed - PHQ-9 score less than 5 in past 6 months    Please review last PHQ-9 score.   PHQ-9 SCORE 9/12/2016 3/9/2017 9/18/2017   Total Score - - -   Total Score 5 5 5     BIPIN-7 SCORE 3/14/2016 3/9/2017 9/18/2017   Total Score - - -   Total Score 1 2 2              Failed - Recent (6 mo) or future (30 days) visit within the authorizing provider's specialty    Patient had office visit in the last 6 months or has a visit in the next 30 days with authorizing provider or within the authorizing provider's specialty.  See \"Patient Info\" tab in inbasket, or \"Choose Columns\" in Meds & Orders section of the refill encounter.           Passed - Patient is age 18 or older       Passed - No active pregnancy on record       Passed - No positive pregnancy test in last 12 months          "

## 2018-07-10 RX ORDER — FLUOXETINE 10 MG/1
CAPSULE ORAL
Qty: 90 CAPSULE | Refills: 0 | Status: SHIPPED | OUTPATIENT
Start: 2018-07-10 | End: 2018-10-01

## 2018-07-19 ENCOUNTER — RADIANT APPOINTMENT (OUTPATIENT)
Dept: ULTRASOUND IMAGING | Facility: CLINIC | Age: 48
End: 2018-07-19
Attending: OBSTETRICS & GYNECOLOGY
Payer: COMMERCIAL

## 2018-07-19 DIAGNOSIS — N93.9 ABNORMAL UTERINE BLEEDING: ICD-10-CM

## 2018-07-19 PROCEDURE — 76830 TRANSVAGINAL US NON-OB: CPT | Performed by: FAMILY MEDICINE

## 2018-07-19 PROCEDURE — 76856 US EXAM PELVIC COMPLETE: CPT | Performed by: FAMILY MEDICINE

## 2018-08-10 DIAGNOSIS — R09.82 POST-NASAL DRIP: ICD-10-CM

## 2018-08-10 NOTE — TELEPHONE ENCOUNTER
"Requested Prescriptions   Pending Prescriptions Disp Refills     loratadine (CLARITIN) 10 MG tablet [Pharmacy Med Name: LORATADINE 10MG TABLETS]    Last Written Prescription Date:  5/16/2018  Last Fill Quantity: 90,  # refills: 0   Last office visit: 9/18/2017 with prescribing provider:  Mary Ellen Wise     Future Office Visit:     90 tablet 0     Sig: TAKE 1 TABLET(10 MG) BY MOUTH DAILY    Antihistamines Protocol Passed    8/10/2018  3:21 AM       Passed - Patient is 3-64 years of age    Apply weight-based dosing for peds patients age 3 - 12 years of age.    Forward request to provider for patients under the age of 3 or over the age of 64.         Passed - Recent (12 mo) or future (30 days) visit within the authorizing provider's specialty    Patient had office visit in the last 12 months or has a visit in the next 30 days with authorizing provider or within the authorizing provider's specialty.  See \"Patient Info\" tab in inbasket, or \"Choose Columns\" in Meds & Orders section of the refill encounter.              "

## 2018-08-14 RX ORDER — LORATADINE 10 MG/1
TABLET ORAL
Qty: 30 TABLET | Refills: 0 | Status: SHIPPED | OUTPATIENT
Start: 2018-08-14 | End: 2018-11-13

## 2018-09-16 DIAGNOSIS — F33.1 MAJOR DEPRESSIVE DISORDER, RECURRENT EPISODE, MODERATE (H): ICD-10-CM

## 2018-09-16 NOTE — TELEPHONE ENCOUNTER
"Requested Prescriptions   Pending Prescriptions Disp Refills     FLUoxetine (PROZAC) 20 MG capsule [Pharmacy Med Name: FLUOXETINE 20MG CAPSULES]  Last Written Prescription Date:  07/10/2018  Last Fill Quantity: 90 capsule,  # refills: 0   Last office visit: 9/18/2017 with prescribing provider:  Mary Ellen Wise MD    Future Office Visit:     90 capsule 0     Sig: TAKE 1 CAPSULE BY MOUTH DAILY, TAKE WITH 10MG CAPSULE FOR TOTAL OF 30MG PER DAY AS DIRECTED    SSRIs Protocol Failed    9/16/2018  3:21 AM       Failed - PHQ-9 score less than 5 in past 6 months    Please review last PHQ-9 score.   PHQ-9 SCORE 9/12/2016 3/9/2017 9/18/2017   Total Score - - -   Total Score 5 5 5     BIPIN-7 SCORE 3/14/2016 3/9/2017 9/18/2017   Total Score - - -   Total Score 1 2 2              Failed - Recent (6 mo) or future (30 days) visit within the authorizing provider's specialty    Patient had office visit in the last 6 months or has a visit in the next 30 days with authorizing provider or within the authorizing provider's specialty.  See \"Patient Info\" tab in inbasket, or \"Choose Columns\" in Meds & Orders section of the refill encounter.           Passed - Patient is age 18 or older       Passed - No active pregnancy on record       Passed - No positive pregnancy test in last 12 months          "

## 2018-09-19 NOTE — TELEPHONE ENCOUNTER
The Pt is due for an office visit, last saw Dr. Wise on 9/18/17. Please call and help her schedule.   I will send in a 30 day supply.     Cecile Hyman RN -- [facility]

## 2018-09-19 NOTE — TELEPHONE ENCOUNTER
LVM detailed ok per record, stating that medication was sent to pharmacy on record and to call into clinic to schedule an appointment with Dr. Wise.    Jennifer Loya MA on 9/19/2018 at 11:59 AM

## 2018-10-01 ENCOUNTER — OFFICE VISIT (OUTPATIENT)
Dept: PEDIATRICS | Facility: CLINIC | Age: 48
End: 2018-10-01
Payer: COMMERCIAL

## 2018-10-01 ENCOUNTER — RADIANT APPOINTMENT (OUTPATIENT)
Dept: MAMMOGRAPHY | Facility: CLINIC | Age: 48
End: 2018-10-01
Attending: PEDIATRICS
Payer: COMMERCIAL

## 2018-10-01 VITALS
TEMPERATURE: 98 F | WEIGHT: 152.3 LBS | RESPIRATION RATE: 28 BRPM | HEART RATE: 76 BPM | DIASTOLIC BLOOD PRESSURE: 74 MMHG | BODY MASS INDEX: 27.86 KG/M2 | SYSTOLIC BLOOD PRESSURE: 112 MMHG

## 2018-10-01 DIAGNOSIS — Z86.32 HISTORY OF GESTATIONAL DIABETES: ICD-10-CM

## 2018-10-01 DIAGNOSIS — E78.2 MIXED HYPERLIPIDEMIA: ICD-10-CM

## 2018-10-01 DIAGNOSIS — F32.1 MODERATE MAJOR DEPRESSION (H): Primary | ICD-10-CM

## 2018-10-01 DIAGNOSIS — R73.01 IFG (IMPAIRED FASTING GLUCOSE): ICD-10-CM

## 2018-10-01 DIAGNOSIS — Z12.11 SPECIAL SCREENING FOR MALIGNANT NEOPLASMS, COLON: ICD-10-CM

## 2018-10-01 DIAGNOSIS — Z12.31 VISIT FOR SCREENING MAMMOGRAM: ICD-10-CM

## 2018-10-01 DIAGNOSIS — Z23 NEED FOR PROPHYLACTIC VACCINATION AND INOCULATION AGAINST INFLUENZA: ICD-10-CM

## 2018-10-01 PROCEDURE — 90686 IIV4 VACC NO PRSV 0.5 ML IM: CPT | Performed by: PEDIATRICS

## 2018-10-01 PROCEDURE — 99214 OFFICE O/P EST MOD 30 MIN: CPT | Mod: 25 | Performed by: PEDIATRICS

## 2018-10-01 PROCEDURE — 90471 IMMUNIZATION ADMIN: CPT | Performed by: PEDIATRICS

## 2018-10-01 PROCEDURE — 77067 SCR MAMMO BI INCL CAD: CPT | Mod: TC

## 2018-10-01 NOTE — PROGRESS NOTES
SUBJECTIVE:   Leti Grover is a 48 year old female who presents to clinic today for the following health issues:      Depression and Anxiety Follow-Up    Status since last visit: No change    Other associated symptoms:None    Complicating factors:     Significant life event: Yes-  finances     Current substance abuse: None    Interested in increasing dose of prozac - wants additional help with thinking more about things before she speaks/reacts, assistance when irritable.  No thoughts of self harm.   High stress taking care of her boys, working, and caring for her  who has been home with chronic back pain and vertigo.   No side effects related to prozac.    PHQ-9 3/9/2017 9/18/2017 10/1/2018   Total Score 5 5 3   Q9: Suicide Ideation Not at all Not at all Not at all     BIPIN-7 SCORE 3/14/2016 3/9/2017 9/18/2017   Total Score - - -   Total Score 1 2 2     In the past two weeks have you had thoughts of suicide or self-harm?  No.    Do you have concerns about your personal safety or the safety of others?   No  PHQ-9  English  PHQ-9   Any Language  BIPIN-7  Suicide Assessment Five-step Evaluation and Treatment (SAFE-T)    Amount of exercise or physical activity: None    Problems taking medications regularly: No    Medication side effects: none    Diet: regular (no restrictions)      Visited with gynecology earlier this year.  Had normal pelvic ultrasound.  On HRT through Ob/Gyn.      Due for mammogram and colon cancer screening.    Hx of IFG and gestational diabetes, weight has been stable, more active in her new job.  Due for lab work.   Past labs demonstrated high LDL and TG - due for repeat of these as well.       Wt Readings from Last 4 Encounters:   10/01/18 152 lb 4.8 oz (69.1 kg)   02/08/18 151 lb 4.8 oz (68.6 kg)   12/07/17 153 lb (69.4 kg)   11/06/17 150 lb 3.2 oz (68.1 kg)         Problem list and histories reviewed & adjusted, as indicated.  Additional history: as documented      Reviewed and  updated as needed this visit by clinical staff  Tobacco  Allergies  Meds  Med Hx  Surg Hx  Fam Hx  Soc Hx      Reviewed and updated as needed this visit by Provider         ROS:  Constitutional, psych, cardiovascular, gi and gu systems are negative, except as otherwise noted.    OBJECTIVE:     /74  Pulse 76  Temp 98  F (36.7  C) (Oral)  Resp 28  Wt 152 lb 4.8 oz (69.1 kg)  LMP 08/20/2014  BMI 27.86 kg/m2  Body mass index is 27.86 kg/(m^2).  GENERAL: healthy, alert and no distress  RESP: lungs clear to auscultation - no rales, rhonchi or wheezes  CV: regular rate and rhythm, normal S1 S2, no S3 or S4, no murmur, click or rub, no peripheral edema and peripheral pulses strong  PSYCH: mentation appears normal, affect normal/bright    Diagnostic Test Results:  pending    ASSESSMENT/PLAN:       ICD-10-CM    1. Moderate major depression (H) F32.1 FLUoxetine (PROZAC) 20 MG capsule    Trial of increase to 40mg dosing.  Patient to alert me if not improving.   2. History of gestational diabetes Z86.32 Hemoglobin A1c   3. IFG (impaired fasting glucose) R73.01 Hemoglobin A1c   4. Mixed hyperlipidemia E78.2 **Comprehensive metabolic panel FUTURE anytime     Lipid panel reflex to direct LDL Fasting    Due for recheck, treat if indicated   5. Need for prophylactic vaccination and inoculation against influenza Z23 FLU VACCINE, SPLIT VIRUS, IM (QUADRIVALENT) [38701]- >3 YRS     Vaccine Administration, Initial [52588]   6. Visit for screening mammogram Z12.31 *MA Screening Digital Bilateral   7. Special screening for malignant neoplasms, colon Z12.11 Fecal colorectal cancer screen (FIT)       See Patient Instructions    Mary Ellen Wise MD  [facility]

## 2018-10-01 NOTE — PATIENT INSTRUCTIONS
Come back for fasting lab visit at your convenience     the FIT card test at the lab and mail back to us    Schedule your mammogram    Increase dose of prozac to 40mg daily.   Alert me if not seeing improvement over the next month.    Flu shot today!

## 2018-10-01 NOTE — LETTER
My Depression Action Plan  Name: Leti Grover   Date of Birth 1970  Date: 10/1/2018    My doctor: Mary Ellen Wise   My clinic: [address]  [address]  [facility] [address] [phone]          GREEN    ZONE   Good Control    What it looks like:     Things are going generally well. You have normal up s and down s. You may even feel depressed from time to time, but bad moods usually last less than a day.   What you need to do:  1. Continue to care for yourself (see self care plan)  2. Check your depression survival kit and update it as needed  3. Follow your physician s recommendations including any medication.  4. Do not stop taking medication unless you consult with your physician first.           YELLOW         ZONE Getting Worse    What it looks like:     Depression is starting to interfere with your life.     It may be hard to get out of bed; you may be starting to isolate yourself from others.    Symptoms of depression are starting to last most all day and this has happened for several days.     You may have suicidal thoughts but they are not constant.   What you need to do:     1. Call your care team, your response to treatment will improve if you keep your care team informed of your progress. Yellow periods are signs an adjustment may need to be made.     2. Continue your self-care, even if you have to fake it!    3. Talk to someone in your support network    4. Open up your depression survival kit           RED    ZONE Medical Alert - Get Help    What it looks like:     Depression is seriously interfering with your life.     You may experience these or other symptoms: You can t get out of bed most days, can t work or engage in other necessary activities, you have trouble taking care of basic hygiene, or basic responsibilities, thoughts of suicide or death that will not go away, self-injurious behavior.     What you need to do:  1. Call  Please call and offer 9/21 or 9/28 @ 7am   your care team and request a same-day appointment. If they are not available (weekends or after hours) call your local crisis line, emergency room or 911.            Depression Self Care Plan / Survival Kit    Self-Care for Depression  Here s the deal. Your body and mind are really not as separate as most people think.  What you do and think affects how you feel and how you feel influences what you do and think. This means if you do things that people who feel good do, it will help you feel better.  Sometimes this is all it takes.  There is also a place for medication and therapy depending on how severe your depression is, so be sure to consult with your medical provider and/ or Behavioral Health Consultant if your symptoms are worsening or not improving.     In order to better manage my stress, I will:    Exercise  Get some form of exercise, every day. This will help reduce pain and release endorphins, the  feel good  chemicals in your brain. This is almost as good as taking antidepressants!  This is not the same as joining a gym and then never going! (they count on that by the way ) It can be as simple as just going for a walk or doing some gardening, anything that will get you moving.      Hygiene   Maintain good hygiene (Get out of bed in the morning, Make your bed, Brush your teeth, Take a shower, and Get dressed like you were going to work, even if you are unemployed).  If your clothes don't fit try to get ones that do.    Diet  I will strive to eat foods that are good for me, drink plenty of water, and avoid excessive sugar, caffeine, alcohol, and other mood-altering substances.  Some foods that are helpful in depression are: complex carbohydrates, B vitamins, flaxseed, fish or fish oil, fresh fruits and vegetables.    Psychotherapy  I agree to participate in Individual Therapy (if recommended).    Medication  If prescribed medications, I agree to take them.  Missing doses can result in serious side effects.   I understand that drinking alcohol, or other illicit drug use, may cause potential side effects.  I will not stop my medication abruptly without first discussing it with my provider.    Staying Connected With Others  I will stay in touch with my friends, family members, and my primary care provider/team.    Use your imagination  Be creative.  We all have a creative side; it doesn t matter if it s oil painting, sand castles, or mud pies! This will also kick up the endorphins.    Witness Beauty  (AKA stop and smell the roses) Take a look outside, even in mid-winter. Notice colors, textures. Watch the squirrels and birds.     Service to others  Be of service to others.  There is always someone else in need.  By helping others we can  get out of ourselves  and remember the really important things.  This also provides opportunities for practicing all the other parts of the program.    Humor  Laugh and be silly!  Adjust your TV habits for less news and crime-drama and more comedy.    Control your stress  Try breathing deep, massage therapy, biofeedback, and meditation. Find time to relax each day.     My support system    Clinic Contact:  Phone number:    Contact 1:  Phone number:    Contact 2:  Phone number:    Latter day/:  Phone number:    Therapist:  Phone number:    [facility] crisis center:    Phone number:    Other community support:  Phone number:

## 2018-10-01 NOTE — MR AVS SNAPSHOT
After Visit Summary   10/1/2018    Leti Grover    MRN: [id number]           Patient Information     Date Of Birth          1970        Visit Information        Provider Department      10/1/2018 9:00 AM Mary Ellen Wise MD [facility]        Today's Diagnoses     Moderate major depression (H)    -  1    Visit for screening mammogram        Special screening for malignant neoplasms, colon        History of gestational diabetes        IFG (impaired fasting glucose)        Mixed hyperlipidemia          Care Instructions    Come back for fasting lab visit at your convenience     the FIT card test at the lab and mail back to us    Schedule your mammogram    Increase dose of prozac to 40mg daily.   Alert me if not seeing improvement over the next month.    Flu shot today!          Follow-ups after your visit        Future tests that were ordered for you today     Open Future Orders        Priority Expected Expires Ordered    *MA Screening Digital Bilateral Routine  10/1/2019 10/1/2018    Hemoglobin A1c Routine  10/1/2019 10/1/2018    Fecal colorectal cancer screen (FIT) Routine 10/22/2018 12/24/2018 10/1/2018    **Comprehensive metabolic panel FUTURE anytime Routine 10/1/2018 10/1/2019 10/1/2018    Lipid panel reflex to direct LDL Fasting Routine 10/1/2018 10/1/2019 10/1/2018            Who to contact     If you have questions or need follow up information about today's clinic visit or your schedule please contact [facility] directly at [phone].  Normal or non-critical lab and imaging results will be communicated to you by MyChart, letter or phone within 4 business days after the clinic has received the results. If you do not hear from us within 7 days, please contact the clinic through MyChart or phone. If you have a critical or abnormal lab result, we will notify you by phone as soon as possible.  Submit refill requests through [employer] or call your pharmacy  and they will forward the refill request to us. Please allow 3 business days for your refill to be completed.          Additional Information About Your Visit        [employer] Information     [employer] gives you secure access to your electronic health record. If you see a primary care provider, you can also send messages to your care team and make appointments. If you have questions, please call your primary care clinic.  If you do not have a primary care provider, please call [phone] and they will assist you.        Care EveryWhere ID     This is your Care EveryWhere ID. This could be used by other organizations to access your [facility] medical records  [id number]        Your Vitals Were     Pulse Temperature Respirations Last Period BMI (Body Mass Index)       76 98  F (36.7  C) (Oral) 28 08/20/2014 27.86 kg/m2        Blood Pressure from Last 3 Encounters:   10/01/18 112/74   02/08/18 104/66   12/07/17 104/70    Weight from Last 3 Encounters:   10/01/18 152 lb 4.8 oz (69.1 kg)   02/08/18 151 lb 4.8 oz (68.6 kg)   12/07/17 153 lb (69.4 kg)              We Performed the Following     DEPRESSION ACTION PLAN (DAP)          Today's Medication Changes          These changes are accurate as of 10/1/18  9:28 AM.  If you have any questions, ask your nurse or doctor.               These medicines have changed or have updated prescriptions.        Dose/Directions    FLUoxetine 20 MG capsule   Commonly known as:  PROzac   This may have changed:    - medication strength  - See the new instructions.  - Another medication with the same name was removed. Continue taking this medication, and follow the directions you see here.   Used for:  Moderate major depression (H)   Changed by:  Mary Ellen Wise MD        Dose:  40 mg   Take 2 capsules (40 mg) by mouth daily   Quantity:  180 capsule   Refills:  11            Where to get your medicines      These medications were sent to [facility] [id number] [place], [place] -  [address] [place] [facility] [address]  [address], [place] [address]     Phone:  [phone]     FLUoxetine 20 MG capsule                Primary Care Provider Office Phone # Fax #    Mary Ellen Wise [phone] [address] [place] [address]        Equal Access to Services     [facility]: Hadii aad ku hadasho Soomaali, waaxda luqadaha, qaybta kaalmada adeegyada, waxay idiin hayaan adeeg kharash la'aan ah. So [facility] [phone].    ATENCIÓN: Si habla español, tiene a lin disposición servicios gratuitos de asistencia lingüística. [facility] [phone].    We comply with applicable federal civil rights laws and [place] laws. We do not discriminate on the basis of race, color, national origin, age, disability, sex, sexual orientation, or gender identity.            Thank you!     Thank you for choosing [facility]  for your care. Our goal is always to provide you with excellent care. Hearing back from our patients is one way we can continue to improve our services. Please take a few minutes to complete the written survey that you may receive in the mail after your visit with us. Thank you!             Your Updated Medication List - Protect others around you: Learn how to safely use, store and throw away your medicines at www.disposemymeds.org.          This list is accurate as of 10/1/18  9:28 AM.  Always use your most recent med list.                   Brand Name Dispense Instructions for use Diagnosis    blood glucose calibration solution    no brand specified    1 each    Use to calibrate blood glucose monitor as directed.    Prediabetes       blood glucose monitoring meter device kit     1 kit    Use to test blood sugar 1 times daily or as directed.    Prediabetes       blood glucose monitoring test strip    no brand specified    100 each    Use to test blood sugars 1 times daily or as directed    Prediabetes       CHOLEST OFF PO            estradiol 0.5 MG tablet    ESTRACE    90 tablet    Take 1 tablet (0.5 mg) by mouth daily    Symptomatic menopausal or female climacteric states       fluocinonide 0.05 % ointment    LIDEX    30 g    Twice daily to rash behind the ears until clear.    Chronic dermatitis       FLUoxetine 20 MG capsule    PROzac    180 capsule    Take 2 capsules (40 mg) by mouth daily    Moderate major depression (H)       loratadine 10 MG tablet    CLARITIN    30 tablet    TAKE 1 TABLET(10 MG) BY MOUTH DAILY    Post-nasal drip       Multi-vitamin Tabs tablet     100 tablet    Take 1 tablet by mouth daily        order for DME     1 Package    Equipment being ordered: CPAP supplies. Length of Need: Lifetime    YAKELIN (obstructive sleep apnea)       oxybutynin 10 MG 24 hr tablet    DITROPAN XL    90 tablet    Take 1 tablet (10 mg) by mouth daily    Mixed incontinence urge and stress (male)(female)       progesterone 100 MG capsule    PROMETRIUM    90 capsule    Take 1 capsule (100 mg) by mouth daily    Climacteric       thin lancets    NO BRAND SPECIFIED    1 Box    Use to test blood sugar 1 times daily or as directed.    Prediabetes       triamcinolone 0.1 % ointment    KENALOG    30 g    To area behind ear twice daily until rash is clear.    Dermatitis

## 2018-10-02 ASSESSMENT — PATIENT HEALTH QUESTIONNAIRE - PHQ9: SUM OF ALL RESPONSES TO PHQ QUESTIONS 1-9: 3

## 2018-10-05 DIAGNOSIS — F33.1 MAJOR DEPRESSIVE DISORDER, RECURRENT EPISODE, MODERATE (H): ICD-10-CM

## 2018-10-08 RX ORDER — FLUOXETINE 10 MG/1
CAPSULE ORAL
Qty: 90 CAPSULE | Refills: 0 | OUTPATIENT
Start: 2018-10-08

## 2018-10-09 DIAGNOSIS — R73.01 IFG (IMPAIRED FASTING GLUCOSE): ICD-10-CM

## 2018-10-09 DIAGNOSIS — Z86.32 HISTORY OF GESTATIONAL DIABETES: ICD-10-CM

## 2018-10-09 DIAGNOSIS — E78.2 MIXED HYPERLIPIDEMIA: ICD-10-CM

## 2018-10-09 LAB
ALBUMIN SERPL-MCNC: 3.8 G/DL (ref 3.4–5)
ALP SERPL-CCNC: 69 U/L (ref 40–150)
ALT SERPL W P-5'-P-CCNC: 45 U/L (ref 0–50)
ANION GAP SERPL CALCULATED.3IONS-SCNC: 8 MMOL/L (ref 3–14)
AST SERPL W P-5'-P-CCNC: 23 U/L (ref 0–45)
BILIRUB SERPL-MCNC: 0.6 MG/DL (ref 0.2–1.3)
BUN SERPL-MCNC: 12 MG/DL (ref 7–30)
CALCIUM SERPL-MCNC: 8.9 MG/DL (ref 8.5–10.1)
CHLORIDE SERPL-SCNC: 103 MMOL/L (ref 94–109)
CHOLEST SERPL-MCNC: 225 MG/DL
CO2 SERPL-SCNC: 27 MMOL/L (ref 20–32)
CREAT SERPL-MCNC: 0.81 MG/DL (ref 0.52–1.04)
GFR SERPL CREATININE-BSD FRML MDRD: 75 ML/MIN/1.7M2
GLUCOSE SERPL-MCNC: 95 MG/DL (ref 70–99)
HBA1C MFR BLD: 6.3 % (ref 0–5.6)
HDLC SERPL-MCNC: 44 MG/DL
LDLC SERPL CALC-MCNC: 110 MG/DL
NONHDLC SERPL-MCNC: 181 MG/DL
POTASSIUM SERPL-SCNC: 4.2 MMOL/L (ref 3.4–5.3)
PROT SERPL-MCNC: 7.9 G/DL (ref 6.8–8.8)
SODIUM SERPL-SCNC: 138 MMOL/L (ref 133–144)
TRIGL SERPL-MCNC: 356 MG/DL

## 2018-10-09 PROCEDURE — 36415 COLL VENOUS BLD VENIPUNCTURE: CPT | Performed by: PEDIATRICS

## 2018-10-09 PROCEDURE — 80053 COMPREHEN METABOLIC PANEL: CPT | Performed by: PEDIATRICS

## 2018-10-09 PROCEDURE — 83036 HEMOGLOBIN GLYCOSYLATED A1C: CPT | Performed by: PEDIATRICS

## 2018-10-09 PROCEDURE — 80061 LIPID PANEL: CPT | Performed by: PEDIATRICS

## 2018-11-03 DIAGNOSIS — N95.1 CLIMACTERIC: ICD-10-CM

## 2018-11-05 NOTE — TELEPHONE ENCOUNTER
Routed to md on-call to approve/deny, as Dr. Garner no longer practices with us.     Nicky MACHADO RN

## 2018-11-13 DIAGNOSIS — R09.82 POST-NASAL DRIP: ICD-10-CM

## 2018-11-13 RX ORDER — LORATADINE 10 MG/1
TABLET ORAL
Qty: 90 TABLET | Refills: 3 | Status: SHIPPED | OUTPATIENT
Start: 2018-11-13 | End: 2019-10-01

## 2018-11-13 NOTE — TELEPHONE ENCOUNTER
"Requested Prescriptions   Pending Prescriptions Disp Refills     loratadine (CLARITIN) 10 MG tablet [Pharmacy Med Name: LORATADINE 10MG TABLETS]    Last Written Prescription Date:  8/14/2018  Last Fill Quantity: 30,  # refills: 0   Last office visit: 10/1/2018 with prescribing provider:  Mary Ellen Wise     Future Office Visit:     30 tablet 0     Sig: TAKE 1 TABLET BY MOUTH EVERY DAY    Antihistamines Protocol Passed    11/13/2018  9:58 AM       Passed - Patient is 3-64 years of age    Apply weight-based dosing for peds patients age 3 - 12 years of age.    Forward request to provider for patients under the age of 3 or over the age of 64.         Passed - Recent (12 mo) or future (30 days) visit within the authorizing provider's specialty    Patient had office visit in the last 12 months or has a visit in the next 30 days with authorizing provider or within the authorizing provider's specialty.  See \"Patient Info\" tab in inbasket, or \"Choose Columns\" in Meds & Orders section of the refill encounter.                "

## 2018-11-14 NOTE — TELEPHONE ENCOUNTER
Prescription approved per [facility], [facility] or [facility] refill protocol.  Tania Brown RN - Triage  [facility]

## 2018-12-06 ENCOUNTER — MYC MEDICAL ADVICE (OUTPATIENT)
Dept: PEDIATRICS | Facility: CLINIC | Age: 48
End: 2018-12-06

## 2018-12-07 NOTE — TELEPHONE ENCOUNTER
Pt sent a [employer] message requesting lab orders for thyroid.  See [employer] message.    Olya Bermudez RN

## 2019-01-18 DIAGNOSIS — N95.1 SYMPTOMATIC MENOPAUSAL OR FEMALE CLIMACTERIC STATES: ICD-10-CM

## 2019-01-18 NOTE — TELEPHONE ENCOUNTER
"Requested Prescriptions   Pending Prescriptions Disp Refills     estradiol (ESTRACE) 0.5 MG tablet [Pharmacy Med Name: ESTRADIOL 0.5MG TABLETS] 90 tablet 0     Sig: TAKE 1 TABLET(0.5 MG) BY MOUTH DAILY    Hormone Replacement Therapy Passed - 1/18/2019  3:21 AM       Passed - Blood pressure under 140/90 in past 12 months    BP Readings from Last 3 Encounters:   10/01/18 112/74   02/08/18 104/66   12/07/17 104/70                Passed - Recent (12 mo) or future (30 days) visit within the authorizing provider's specialty    Patient had office visit in the last 12 months or has a visit in the next 30 days with authorizing provider or within the authorizing provider's specialty.  See \"Patient Info\" tab in inbasket, or \"Choose Columns\" in Meds & Orders section of the refill encounter.             Passed - Patient has mammogram in past 2 years on file if age 50-75       Passed - Medication is active on med list       Passed - Patient is 18 years of age or older       Passed - No active pregnancy on record       Passed - No positive pregnancy test on record in past 12 months          Former [facility] pt.  Sent her a [employer] message to establish care with terrie calderon.    Jennifer LACY R.N.  [facility]    "

## 2019-01-29 RX ORDER — ESTRADIOL 0.5 MG/1
TABLET ORAL
Qty: 90 TABLET | Refills: 0 | Status: SHIPPED | OUTPATIENT
Start: 2019-01-29 | End: 2019-06-04

## 2019-01-29 NOTE — TELEPHONE ENCOUNTER
Pt has not read the my chart message that was sent to her.   Cell number does not accept incoming calls.   Called home number and spoke with the pt and advised that she needs to establish care with another provider. I transferred her to the appt line to make an appt. Once appt is made, we can refill her med.     Nicky MACHADO RN

## 2019-01-31 DIAGNOSIS — N39.46 MIXED INCONTINENCE URGE AND STRESS (MALE)(FEMALE): ICD-10-CM

## 2019-01-31 RX ORDER — OXYBUTYNIN CHLORIDE 10 MG/1
TABLET, EXTENDED RELEASE ORAL
Qty: 90 TABLET | Refills: 0 | Status: SHIPPED | OUTPATIENT
Start: 2019-01-31 | End: 2019-04-26

## 2019-01-31 NOTE — TELEPHONE ENCOUNTER
Prescription approved per [facility], [facility] or [facility] refill protocol.  Tania ALANIZ RN - Triage  [facility]

## 2019-01-31 NOTE — TELEPHONE ENCOUNTER
"Requested Prescriptions   Pending Prescriptions Disp Refills     oxybutynin ER (DITROPAN-XL) 10 MG 24 hr tablet [Pharmacy Med Name: OXYBUTYNIN ER 10MG TABLETS]    Last Written Prescription Date:  2/26/2018  Last Fill Quantity: 90,  # refills: 3   Last office visit: 10/1/2018 with prescribing provider:  Mary Ellen Wise     Future Office Visit:   Next 5 appointments (look out 90 days)    Feb 05, 2019  8:30 AM CST  PHYSICAL with Hortensia Cruz MD  [facility] [address]  [address]  [facility] [id number] [phone]          90 tablet 0     Sig: TAKE 1 TABLET(10 MG) BY MOUTH DAILY    Muscarinic Antagonists (Urinary Incontinence Agents) Passed - 1/31/2019  3:21 AM       Passed - Recent (12 mo) or future (30 days) visit within the authorizing provider's specialty    Patient had office visit in the last 12 months or has a visit in the next 30 days with authorizing provider or within the authorizing provider's specialty.  See \"Patient Info\" tab in inbasket, or \"Choose Columns\" in Meds & Orders section of the refill encounter.             Passed - Medication is Oxybutynin and patient is 5 years of age or older       Passed - Patient does not have a diagnosis of glaucoma on the problem list    If glaucoma diagnosis is new, refer refill to physician.         Passed - Medication is active on med list       Passed - Patient is 18 years of age or older          "

## 2019-03-22 ENCOUNTER — HEALTH MAINTENANCE LETTER (OUTPATIENT)
Age: 49
End: 2019-03-22

## 2019-03-26 ENCOUNTER — TELEPHONE (OUTPATIENT)
Dept: PEDIATRICS | Facility: CLINIC | Age: 49
End: 2019-03-26

## 2019-03-26 DIAGNOSIS — R87.611 PAPANICOLAOU SMEAR OF CERVIX WITH ATYPICAL SQUAMOUS CELLS CANNOT EXCLUDE HIGH GRADE SQUAMOUS INTRAEPITHELIAL LESION (ASC-H): ICD-10-CM

## 2019-03-26 NOTE — TELEPHONE ENCOUNTER
Pt is past due for f/u pap smear.  [facility] clinic and schedule.  Minoo Strange,    Pap Tracking

## 2019-03-29 DIAGNOSIS — N95.1 CLIMACTERIC: ICD-10-CM

## 2019-03-29 NOTE — TELEPHONE ENCOUNTER
Medication is being filled for 1 time refill only due to:  Patient needs to be seen because it has been more than one year since last visit.   My chart message sent tot the pt.       Nicky MACHADO RN

## 2019-04-26 DIAGNOSIS — N39.46 MIXED INCONTINENCE URGE AND STRESS (MALE)(FEMALE): ICD-10-CM

## 2019-04-26 DIAGNOSIS — N95.1 SYMPTOMATIC MENOPAUSAL OR FEMALE CLIMACTERIC STATES: ICD-10-CM

## 2019-04-26 RX ORDER — ESTRADIOL 0.5 MG/1
TABLET ORAL
Qty: 90 TABLET | Refills: 0 | OUTPATIENT
Start: 2019-04-26

## 2019-04-26 NOTE — TELEPHONE ENCOUNTER
rx denies as pt needs to establish care with a different provider. She previously saw Dr. Garner. Last krissy 2/2018.   Pt was already advised of this.     Nicky MACHADO RN

## 2019-04-26 NOTE — TELEPHONE ENCOUNTER
"Requested Prescriptions   Pending Prescriptions Disp Refills     oxybutynin ER (DITROPAN-XL) 10 MG 24 hr tablet [Pharmacy Med Name: OXYBUTYNIN ER 10MG TABLETS]  Last Written Prescription Date:  01/31/2019  Last Fill Quantity: 90 tablet,  # refills: 0    Last office visit: 10/1/2018 with prescribing provider:  Mary Ellen Wise MD        Future Office Visit:     90 tablet 0     Sig: TAKE 1 TABLET(10 MG) BY MOUTH DAILY       Muscarinic Antagonists (Urinary Incontinence Agents) Passed - 4/26/2019  3:20 AM        Passed - Recent (12 mo) or future (30 days) visit within the authorizing provider's specialty     Patient had office visit in the last 12 months or has a visit in the next 30 days with authorizing provider or within the authorizing provider's specialty.  See \"Patient Info\" tab in inbasket, or \"Choose Columns\" in Meds & Orders section of the refill encounter.              Passed - Medication is Oxybutynin and patient is 5 years of age or older        Passed - Patient does not have a diagnosis of glaucoma on the problem list     If glaucoma diagnosis is new, refer refill to physician.          Passed - Medication is active on med list        Passed - Patient is 18 years of age or older          "

## 2019-04-30 RX ORDER — OXYBUTYNIN CHLORIDE 10 MG/1
TABLET, EXTENDED RELEASE ORAL
Qty: 90 TABLET | Refills: 1 | Status: SHIPPED | OUTPATIENT
Start: 2019-04-30 | End: 2019-10-01

## 2019-04-30 NOTE — TELEPHONE ENCOUNTER
Routing refill request to provider for review/approval because:  Ok to refill or would you like patient to follow up with urology?    NOEMY Perez RN

## 2019-05-31 DIAGNOSIS — N95.1 SYMPTOMATIC MENOPAUSAL OR FEMALE CLIMACTERIC STATES: ICD-10-CM

## 2019-05-31 RX ORDER — ESTRADIOL 0.5 MG/1
TABLET ORAL
Qty: 90 TABLET | Refills: 0 | OUTPATIENT
Start: 2019-05-31

## 2019-05-31 NOTE — TELEPHONE ENCOUNTER
Pt has been reminded to make appt, no appt made.  Denied, overdue for repeat pap [id number].    Jennifer LACY R.N.  [facility]

## 2019-06-04 ENCOUNTER — MYC REFILL (OUTPATIENT)
Dept: OBGYN | Facility: CLINIC | Age: 49
End: 2019-06-04

## 2019-06-04 DIAGNOSIS — N95.1 SYMPTOMATIC MENOPAUSAL OR FEMALE CLIMACTERIC STATES: ICD-10-CM

## 2019-06-05 RX ORDER — ESTRADIOL 0.5 MG/1
0.5 TABLET ORAL DAILY
Qty: 90 TABLET | Refills: 0 | Status: SHIPPED | OUTPATIENT
Start: 2019-06-05 | End: 2019-07-08

## 2019-06-05 NOTE — TELEPHONE ENCOUNTER
"Requested Prescriptions   Pending Prescriptions Disp Refills     estradiol (ESTRACE) 0.5 MG tablet 90 tablet 0       Hormone Replacement Therapy Failed - 6/4/2019  7:54 PM        Failed - Recent (12 mo) or future (30 days) visit within the authorizing provider's specialty     Patient had office visit in the last 12 months or has a visit in the next 30 days with authorizing provider or within the authorizing provider's specialty.  See \"Patient Info\" tab in inbasket, or \"Choose Columns\" in Meds & Orders section of the refill encounter.              Passed - Blood pressure under 140/90 in past 12 months     BP Readings from Last 3 Encounters:   10/01/18 112/74   02/08/18 104/66   12/07/17 104/70                 Passed - Patient has mammogram in past 2 years on file if age 50-75        Passed - Medication is active on med list        Passed - Patient is 18 years of age or older        Passed - No active pregnancy on record        Passed - No positive pregnancy test on record in past 12 months        Pt has an appt next month with Dr Meza. Medication refilled to get her through to that appt.      Nell Stevens RN    "

## 2019-06-30 DIAGNOSIS — N95.1 CLIMACTERIC: ICD-10-CM

## 2019-07-01 NOTE — TELEPHONE ENCOUNTER
Pt has an appt on 7/8/19.     Prescription approved per [facility] Refill Protocol.      Nicky MACHADO RN

## 2019-07-08 ENCOUNTER — OFFICE VISIT (OUTPATIENT)
Dept: OBGYN | Facility: CLINIC | Age: 49
End: 2019-07-08
Payer: COMMERCIAL

## 2019-07-08 VITALS — DIASTOLIC BLOOD PRESSURE: 70 MMHG | SYSTOLIC BLOOD PRESSURE: 114 MMHG | BODY MASS INDEX: 27.14 KG/M2 | WEIGHT: 148.4 LBS

## 2019-07-08 DIAGNOSIS — N95.1 CLIMACTERIC: ICD-10-CM

## 2019-07-08 DIAGNOSIS — N95.1 SYMPTOMATIC MENOPAUSAL OR FEMALE CLIMACTERIC STATES: ICD-10-CM

## 2019-07-08 DIAGNOSIS — Z78.0 MENOPAUSE: Primary | ICD-10-CM

## 2019-07-08 DIAGNOSIS — R87.611 PAPANICOLAOU SMEAR OF CERVIX WITH ATYPICAL SQUAMOUS CELLS CANNOT EXCLUDE HIGH GRADE SQUAMOUS INTRAEPITHELIAL LESION (ASC-H): ICD-10-CM

## 2019-07-08 PROCEDURE — 88175 CYTOPATH C/V AUTO FLUID REDO: CPT | Performed by: OBSTETRICS & GYNECOLOGY

## 2019-07-08 PROCEDURE — 99396 PREV VISIT EST AGE 40-64: CPT | Performed by: OBSTETRICS & GYNECOLOGY

## 2019-07-08 PROCEDURE — G0476 HPV COMBO ASSAY CA SCREEN: HCPCS | Performed by: OBSTETRICS & GYNECOLOGY

## 2019-07-08 RX ORDER — ESTRADIOL 0.5 MG/1
0.5 TABLET ORAL EVERY OTHER DAY
Qty: 90 TABLET | Refills: 3 | Status: SHIPPED | OUTPATIENT
Start: 2019-07-08 | End: 2019-10-01

## 2019-07-08 NOTE — NURSING NOTE
"Chief Complaint   Patient presents with     Physical     last pap was 17- AS[facility] with negative HPV, [place] was 18- Normal.        Initial /70 (BP Location: Right arm, Patient Position: Chair, Cuff Size: Adult Regular)   Wt 67.3 kg (148 lb 6.4 oz)   LMP 2014   BMI 27.14 kg/m   Estimated body mass index is 27.14 kg/m  as calculated from the following:    Height as of 10/9/17: 1.575 m (5' 2\").    Weight as of this encounter: 67.3 kg (148 lb 6.4 oz).  BP completed using cuff size: regular    Questioned patient about current smoking habits.  Pt. has never smoked.          The following HM Due: pap smear      Narendra Cai CMA               "

## 2019-07-08 NOTE — PATIENT INSTRUCTIONS
Preventive Health Recommendations  Female Ages 40-64  Taper your estrogen to every other day for 2 weeks, if no symptoms, space to every 3 days, then every 4. If no symptoms return you can stop both estrogen and progesterone.   Review the American Heart Association diet or Fill my plate to understand heathy eating guidelines. Junk food is only ok once you've already met all of your nutritional needs for the day   Yearly exam:    See your health care provider every year in order to    Review health changes.      Discuss preventive care.       Review your medicines if you your doctor has prescribed any.    Pap Smears: You should have a Pap test every 3 years or have a Pap test with HPV screening every 5 years.    You do not need a Pap test if your uterus was removed (hysterectomy) and you have not had cancer.   Breast Cancer Screening: You should begin mammography for breast cancer screening by age 40 or 50 depending on your personal risks and your doctors recommendation. Screening should occur every year or every other year based on your discussion with your doctor.  Colorectal Cancer Screening: Starting at age 50 you need to undergo colonoscopy (every 5-10 years) or other colon cancer screening.    Health Maintenance:  - Your cholesterol should be checked every 5 years, more often if you have increased risk factors such as diabetes, high blood pressure, tobacco use, obesity, or a strong family history of cardiovascular disease before age 50 in men and 60 in women  - If you are at risk for diabetes due to being overweight or obese, having a strong family history, or having a history of gestational diabetes you should have a diabetes test (fasting glucose or Hemoglobin A1c level) every 3 years.  Shots: Get a flu shot each year. Get a tetanus shot every 10 years.    Nutrition:      Eat at least 5 servings of fruits and vegetables each day.    Eat whole-grain bread, whole-wheat pasta, faro, quinoa, barley and brown rice  instead of white grains and rice.    Consume 1000mg of Calcium and 1000u of Vitamin D daily. If these are not in your regular diet you should take a supplement.    To calculate the calcium in your food add a zero to the percent found on the packaging (ex: 30% = 300mg of calcium per serving)    Good sources of Calcium include:    Low-fat dairy products (200 to 300 milligrams per serving)      Dark green leafy vegetables     Canned salmon or sardines with bones     Soy products, such as tofu     Calcium-fortified cereals and orange juice    Osteopenia is low bone mass, your risk increases once you reach menopause and your calcium needs then increase to 1,200mg daily    The [employer] [employer] recommends that total calcium intake, from supplements and diet combined, should be no more than 2,000 milligrams daily for people older than 50.    Lifestyle    Get in at least 150 minutes of physical activity a week (30 minutes a day, 5 days of the week), of which about half should be vigorous exercise (jogging, swimming, lifting weights).  This will help you control your weight and prevent disease. You must increase the intensity and duration of exercise in order to lose weight, if that is your goal. To keep your bones strong and prevent fractures, plan at least 90 min/week of high impact exercise.    High-impact exercise includes workouts like:  Brisk walking.  Climbing stairs.  Dancing.  Hiking.  Jogging.  Jumping rope.  Step aerobics.  Light weight lifting routines.  Isai Chi and yoga.  Tennis or other racquet sports.      Limit alcohol to one drink per day.    No smoking.      Wear sunscreen to prevent skin cancer.    See your dentist every six months for an exam and cleaning        Menopause and Perimenopause    Hot flashes, night sweats, mood changes, sleep disturbances, changes in the menstrual periods, decreased interest in sex, vaginal dryness or painful sex, and changes in the skin and hair are all common symptoms  of perimenopause (the period of time, lasting several years, leading up to menopause). Menopause is defined as 12 months without a menstrual period.     It might be helpful to make a list of your symptoms, and to rank them in order of how much they bother you, or which ones you would fix first if you could. This can help us decide what treatment options might be best for you. Treatment can include one or more of the following: lifestyle changes like diet and exercise, supplements, prescription medications for hormones, and other prescriptions that are nonhormonal. We will discuss these in more detail after your test results (if you are having lab work done) are available, when you come back. Some tests that might be done for women in perimenopause include blood work for hormone levels and to check the thyroid or ultrasound or biopsy of the lining of the uterus (if you are having abnormal bleeding).

## 2019-07-08 NOTE — PROGRESS NOTES
"Gynecology Clinic Visit:    SUBJECTIVE:     Leti is a 48 year old  who presents for annual exam.   Postmenopausal since .  She is having no menopausal symptoms. She is on HRT. She is not sure why she started HRT.  No vaginal bleeding noted, no discharge or vaginal irritation.     Besides routine health maintenance,  she would like to discuss the need for HRT and tapering off.  Employment: dishes and bussing tables at [employer]  Exercise: work activity plus twice weekly skating  Diet: drinks milk 3x/day, \"has a problem with junk food\", rarely eats fruits/veg  Family history of breast CA: No  Family history of uterine/ovarian CA: No  Family history of colon CA: No    HEALTH MAINTENANCE:  Last Mammogram: 10/2018, [facility]ads 1     Lab Results   Component Value Date    PAP ASC-H, hpv- 2017    PAP NIL 09/15/2014    PAP NIL 2011      Pap smear ASCUS-H/neg HPV: 17  +Previous history of abnormal paps/treatment;   09 pap = ASC-H  10/22/09 COLP = RADHA 1  5/13/10 pap = ASCUS, HPV neg  6/24/10 COLP = RADHA 1  12/2/10 pap = ASCUS, HPV neg  6/3/11 pap = Neg pap and neg hpv screening---r/p pap in 1 yr or at postpartum visit (EDC -11)  [facility]. Patient plans to repeat pap in 2013  09/15/14 Pap= NIL, Neg HPV. 3 yr co-testing.  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      Social History     Tobacco Use     Smoking status: Never Smoker     Smokeless tobacco: Never Used "   Substance Use Topics     Alcohol use: No     Alcohol/week: 0.0 oz      Problem (# of Occurrences) Relation (Name,Age of Onset)    Cancer (1) Father: skin cancer    Diabetes (1) Other (Aunt)       Negative family history of: C.A.D., Breast Cancer, Cancer - colorectal              OB History    Para Term  AB Living   3 2 0 2 1 2   SAB TAB Ectopic Multiple Live Births   1 0 0 0 1      # Outcome Date GA Lbr Marco/2nd Weight Sex Delivery Anes PTL Lv   3  11 36w5d 02:40 / 00:19 1.899 kg (4 lb 3 oz) M Vag-Spont Local N       Birth Comments: prematurity      Name: Rigo      Apgar1: 8  Apgar5: 8   2  02 36w0d  1.984 kg (4 lb 6 oz) M    MARTA      Birth Comments: induction for pre-eclampsia      Name: Onofre Velez SAB      SAB         Obstetric Comments   Had probable miscarriage in mid  - heavy bleeding and clots, hospitalized for blood transfusions.     Past Medical History:   Diagnosis Date     Anxiety and depression      Other and unspecified hyperlipidemia      Papanicolaou smear of cervix with atypical squamous cells cannot exclude high grade squamous intraepithelial lesion (ASC-H) 09, 2017           Current Outpatient Medications:      estradiol (ESTRACE) 0.5 MG tablet, Take 1 tablet (0.5 mg) by mouth daily, Disp: 90 tablet, Rfl: 0     FLUoxetine (PROZAC) 20 MG capsule, Take 2 capsules (40 mg) by mouth daily, Disp: 180 capsule, Rfl: 11     loratadine (CLARITIN) 10 MG tablet, TAKE 1 TABLET BY MOUTH EVERY DAY, Disp: 90 tablet, Rfl: 3     multivitamin, therapeutic with minerals (MULTI-VITAMIN) TABS, Take 1 tablet by mouth daily, Disp: 100 tablet, Rfl: 3     order for DME, Equipment being ordered: CPAP supplies. Length of Need: Lifetime, Disp: 1 Package, Rfl: 99     oxybutynin ER (DITROPAN-XL) 10 MG 24 hr tablet, TAKE 1 TABLET(10 MG) BY MOUTH DAILY, Disp: 90 tablet, Rfl: 1     Plant Sterols and Stanols (CHOLEST OFF PO), , Disp: , Rfl:      progesterone  (PROMETRIUM) 100 MG capsule, TAKE ONE CAPSULE BY MOUTH EVERY DAY, Disp: 90 capsule, Rfl: 0     thin (NO BRAND SPECIFIED) lancets, Use to test blood sugar 1 times daily or as directed., Disp: 1 Box, Rfl: 3     triamcinolone (KENALOG) 0.1 % ointment, To area behind ear twice daily until rash is clear., Disp: 30 g, Rfl: 1     blood glucose calibration (NO BRAND SPECIFIED) solution, Use to calibrate blood glucose monitor as directed. (Patient not taking: Reported on 7/8/2019), Disp: 1 each, Rfl: 0     blood glucose monitoring (TOM CONTOUR NEXT MONITOR) meter device kit, Use to test blood sugar 1 times daily or as directed. (Patient not taking: Reported on 7/8/2019), Disp: 1 kit, Rfl: 0     blood glucose monitoring (NO BRAND SPECIFIED) test strip, Use to test blood sugars 1 times daily or as directed (Patient not taking: Reported on 7/8/2019), Disp: 100 each, Rfl: 3     fluocinonide (LIDEX) 0.05 % ointment, Twice daily to rash behind the ears until clear. (Patient not taking: Reported on 7/8/2019), Disp: 30 g, Rfl: 2  No Known Allergies    Past medical, surgical, social and family history were reviewed and updated in EPIC.    ROS:    Const: no weight changes, fever, chills  Breast: no nipple discharge, skin changes, lumps  Endo: no heat or cold intolerance, fatigue    OBJECTIVE:     EXAM:  /70 (BP Location: Right arm, Patient Position: Chair, Cuff Size: Adult Regular)   Wt 67.3 kg (148 lb 6.4 oz)   LMP 08/20/2014   BMI 27.14 kg/m   Body mass index is 27.14 kg/m .   Constitutional: healthy, alert and no distress  Head: Normocephalic. No masses, lesions, tenderness or abnormalities  Neck: Neck supple.  Cardiovascular: regular rate, well perfused  Respiratory: Negative.   Breast: No visible masses or suspicious skin changes.  No discrete or dominant masses to palpation.  No axillary lymphadenopathy.  Gastrointestinal: Abdomen soft, non-tender, non-distended. No masses, organomegaly  Vulva:  No external lesions,  normal female hair distribution, no inguinal adenopathy.    Urethra:  Midline, non-tender, well supported, no discharge  Vagina:  Atrophic, no abnormal discharge, no lesions  Cervix: no lesions, no discharge  Uterus:  anteverted, smooth contour, without enlargement, mobile, and without tenderness  Ovaries:  No masses appreciated, non-tender, mobile  Musculoskeletal: extremities normal  Skin: no suspicious lesions or rashes  Psychiatric: Affect appropriate, cooperative, mentation appears normal.       ASSESSMENT/PLAN:                                                    Leti Grover is a 48 year old female  with satisfactory annual exam and management of HRT.    PLAN:  Dx:  1)  Recent ASC-H/HPV- and normal [place]. Per ASCCP guidelines plan co-testing at 12 and 24 months.  2)  Mammogram: due in October  3) Symptomatic menopausal or female climacteric states  - estradiol (ESTRACE) 0.5 MG tablet; Take 1 tablet (0.5 mg) by mouth every other day  Dispense: 90 tablet; Refill: 3  - progesterone (PROMETRIUM) 100 MG capsule; Take 1 capsule (100 mg) by mouth daily  Dispense: 90 capsule; Refill: 3  PE:  Reviewed health maintenance including diet, regular exercise   and periodic exams.  - Counseled to try estrace every other day, if no vasomotor symptoms, reduce to every 3rd then every 4th night for 1 week each. Discontinue both E & P completely if no symptoms at that time. Reviewed bone and heart health including weight bearing and aerobic exercise in addition to diet.     Return to clinic in 1 year for routine breast and pelvic exam with diagnostic pap and HPV testing.      COUNSELING:   Reviewed preventive health counseling, as reflected in patient instructions   reports that she has never smoked. She has never used smokeless tobacco.        FRAX Risk Assessment    Lilly Meza MD   Obstetrics and Gynecology  2019

## 2019-07-10 LAB
COPATH REPORT: NORMAL
PAP: NORMAL

## 2019-07-11 LAB
FINAL DIAGNOSIS: NORMAL
HPV HR 12 DNA CVX QL NAA+PROBE: NEGATIVE
HPV16 DNA SPEC QL NAA+PROBE: NEGATIVE
HPV18 DNA SPEC QL NAA+PROBE: NEGATIVE
SPECIMEN DESCRIPTION: NORMAL
SPECIMEN SOURCE CVX/VAG CYTO: NORMAL

## 2019-07-22 ENCOUNTER — APPOINTMENT (OUTPATIENT)
Dept: GENERAL RADIOLOGY | Facility: CLINIC | Age: 49
End: 2019-07-22
Attending: EMERGENCY MEDICINE
Payer: COMMERCIAL

## 2019-07-22 ENCOUNTER — HOSPITAL ENCOUNTER (EMERGENCY)
Facility: CLINIC | Age: 49
Discharge: HOME | End: 2019-07-23
Attending: EMERGENCY MEDICINE | Admitting: EMERGENCY MEDICINE
Payer: COMMERCIAL

## 2019-07-22 VITALS
OXYGEN SATURATION: 98 % | SYSTOLIC BLOOD PRESSURE: 150 MMHG | DIASTOLIC BLOOD PRESSURE: 70 MMHG | HEART RATE: 88 BPM | RESPIRATION RATE: 18 BRPM | TEMPERATURE: 98.3 F

## 2019-07-22 DIAGNOSIS — S42.112A CLOSED DISPLACED FRACTURE OF BODY OF LEFT SCAPULA, INITIAL ENCOUNTER: ICD-10-CM

## 2019-07-22 PROCEDURE — 73030 X-RAY EXAM OF SHOULDER: CPT | Mod: LT

## 2019-07-22 PROCEDURE — 73010 X-RAY EXAM OF SHOULDER BLADE: CPT | Mod: LT

## 2019-07-22 PROCEDURE — 99285 EMERGENCY DEPT VISIT HI MDM: CPT | Mod: 25

## 2019-07-22 PROCEDURE — 96374 THER/PROPH/DIAG INJ IV PUSH: CPT

## 2019-07-22 PROCEDURE — 25000128 H RX IP 250 OP 636: Performed by: EMERGENCY MEDICINE

## 2019-07-22 RX ORDER — OXYCODONE AND ACETAMINOPHEN 5; 325 MG/1; MG/1
1 TABLET ORAL ONCE
Status: COMPLETED | OUTPATIENT
Start: 2019-07-22 | End: 2019-07-23

## 2019-07-22 RX ORDER — OXYCODONE AND ACETAMINOPHEN 5; 325 MG/1; MG/1
1 TABLET ORAL EVERY 6 HOURS PRN
Qty: 12 TABLET | Refills: 0 | Status: SHIPPED | OUTPATIENT
Start: 2019-07-22 | End: 2019-10-01

## 2019-07-22 RX ORDER — FENTANYL CITRATE 50 UG/ML
50 INJECTION, SOLUTION INTRAMUSCULAR; INTRAVENOUS ONCE
Status: COMPLETED | OUTPATIENT
Start: 2019-07-22 | End: 2019-07-22

## 2019-07-22 RX ADMIN — FENTANYL CITRATE 50 MCG: 50 INJECTION INTRAMUSCULAR; INTRAVENOUS at 22:47

## 2019-07-22 NOTE — ED AVS SNAPSHOT
[facility] Emergency Department  [address]  [facility] [address]  Phone:  [phone]  Fax:  [phone]                                    Leti Grover   MRN: [id number]    Department:  [facility] Emergency Department   Date of Visit:  7/22/2019           After Visit Summary Signature Page    I have received my discharge instructions, and my questions have been answered. I have discussed any challenges I see with this plan with the nurse or doctor.    ..........................................................................................................................................  Patient/Patient Representative Signature      ..........................................................................................................................................  Patient Representative Print Name and Relationship to Patient    ..................................................               ................................................  Date                                   Time    ..........................................................................................................................................  Reviewed by Signature/Title    ...................................................              ..............................................  Date                                               Time          22EPIC Rev 08/18

## 2019-07-23 ENCOUNTER — TRANSFERRED RECORDS (OUTPATIENT)
Dept: HEALTH INFORMATION MANAGEMENT | Facility: CLINIC | Age: 49
End: 2019-07-23

## 2019-07-23 PROCEDURE — 25000132 ZZH RX MED GY IP 250 OP 250 PS 637: Performed by: EMERGENCY MEDICINE

## 2019-07-23 RX ADMIN — OXYCODONE HYDROCHLORIDE AND ACETAMINOPHEN 1 TABLET: 5; 325 TABLET ORAL at 00:00

## 2019-07-23 ASSESSMENT — ENCOUNTER SYMPTOMS
NECK PAIN: 0
SHORTNESS OF BREATH: 0
BACK PAIN: 0
ARTHRALGIAS: 1

## 2019-07-23 NOTE — ED TRIAGE NOTES
Fell while rollerskating. Left shoulder pain. CMS intact. No deformity noted. Unable to move arm. Distal fingers CMS intact

## 2019-07-23 NOTE — ED NOTES
Bed: ED17  Expected date: 7/22/19  Expected time: 9:59 PM  Means of arrival: Ambulance  Comments:  EDDI2

## 2019-07-23 NOTE — ED PROVIDER NOTES
History   Chief Complaint:  Shoulder Pain    HPI   Leti Grover is a 49 year old female with a history of hyperlipidemia who presents via EMS after she was roller skating and was trying to go around a [employer] and lost her balance, falling essentially flat on her back. She said she did mildly hit the back of her had but she had no loss of consciousness. She denies any neck pain. She describes only pain in her shoulder and scapular area. She denies any rib pain, pleuritic discomfort, difficulty breathing, back pain, or other extremity injury.    Allergies:  No known drug allergies    Medications:    Estradiol  Prozac  Oxybutynin  Plant sterols and stanols  Progesterone    Past Medical History:    Anxiety  Moderate major depression  Hyperlipidemia  Pap smear of cervix with ASC-H  YAKELIN  Chronic rhinitis  Gestational diabetes  Obesity    Past Surgical History:    Pterygium with conjunctival autologous transplant    Family History:    Skin cancer    Social History:  Smoking status: Never smoker  Alcohol use: No  Marital Status:   [2]     Review of Systems   Respiratory: Negative for shortness of breath.    Musculoskeletal: Positive for arthralgias (left shoulder). Negative for back pain and neck pain.        Positive for pain in the left scapular area  Negative for rib pain.   All other systems reviewed and are negative.      Physical Exam   Patient Vitals for the past 24 hrs:   BP Temp Temp src Pulse Resp SpO2   07/22/19 2315 150/70 -- -- -- -- 98 %   07/22/19 2245 -- -- -- -- -- 97 %   07/22/19 2230 -- -- -- -- -- 97 %   07/22/19 2215 (!) 143/91 98.3  F (36.8  C) Oral 88 18 97 %     Physical Exam  Vital signs and nursing notes reviewed.     Constitutional: laying on gurney appears uncomfortable  HENT: Oropharynx is clear and moist, no scalp hematoma  Eyes: Conjunctivae are normal bilaterally. Pupils equal round and reactive. No EOM abnormalities.  Neck: normal range of motion, no pain with movement or midline  tenderness  Cardiovascular: Normal rate, regular rhythm, normal heart sounds.   Pulmonary/Chest: Effort normal and breath sounds normal. No respiratory distress.   Abdominal: Soft. Bowel sounds are normal. No tenderness to palpation. No rebound or guarding.   Musculoskeletal: No midline back tenderness, or posterior rib pain.  Tenderness and mild soft tissue swelling at the left scapular area.  Posterior shoulder pain. No shoulder deformity.  No left elbow, wrist or hand pain. Limited ROM of left shoulder due to pain.  Neurological: Alert and oriented. No focal weakness. GCS 15.  No paresthesias or weakness in left upper extremity  Skin: Skin is warm and dry. No rash noted.   Psych: normal affect    Emergency Department Course   Imaging:  Radiographic findings were communicated with the patient who voiced understanding of the findings.    XR Shoulder Left G/E 3 Views:  Deformity of the inferior scapular body suspicious for a  mildly displaced fracture. Mild degenerative changes AC joint.  Otherwise negative.  As read by Radiology.    XR Scapula Left:  Mildly displaced fracture involving the mid to lower  aspect of the scapular body. Otherwise negative. No shoulder  dislocation.  As read by Radiology.    Interventions:  2247: Sublimaze 50 mcg IV  0000: Percocet 1 tablet PO    Emergency Department Course:  The patient presents to the ED via EMS.     Past medical records, nursing notes, and vitals reviewed.   2220: I performed an exam of the patient and obtained history, as documented above.    The patient was sent for a left shoulder x-ray and left scapula x-ray while in the emergency department, findings above.    I rechecked the patient. Findings and plan explained to the patient. Patient discharged home with instructions regarding supportive care, medications, and reasons to return. The importance of close follow-up was reviewed.     Impression & Plan    Medical Decision Making:  Leti Grover is a 49 year old  female who presents after falling and injuring her left shoulder area. On examination, there is no apparent dislocation. She had a normal neurovascular exam in the left upper extremity. From head-to-toe examination, she had no signs of significant head, neck, rib, or other extremity injury. She had a GCS of 15 and a normal neurologic exam. X-rays of her left shoulder and scapula were obtained, there is no indication for shoulder dislocation but there are findings of a scapular fracture that is mildly displaced at the mid-to-lower aspect. I reviewed the results with the patient, she was placed in a shoulder sling. I advised her to sleep in an inclined position, ice the area three times daily. She is to contact orthopedic clinic tomorrow to schedule a follow-up appointment for evaluation as she will likely need physical therapy. If however she develops severe worsening pain, chest pain, shortness of breath, severe headache, neck pain, or other concerning symptoms she is to return here. Patient understands the plan, was discharged home.    Diagnosis:    ICD-10-CM   1. Closed displaced fracture of body of left scapula, initial encounter S42.112A       Disposition:  Discharged to home.    Discharge Medications:     Medication List      Started    oxyCODONE-acetaminophen 5-325 MG tablet  Commonly known as:  PERCOCET  1 tablet, Oral, EVERY 6 HOURS PRN              Rod Grace  7/22/2019   [facility] EMERGENCY DEPARTMENT  IRod, am serving as a scribe at 11:53 PM on 7/22/2019 to document services personally performed by Joe Hyman MD based on my observations and the provider's statements to me.        Joe Hyman MD  07/23/19 [id number]

## 2019-07-30 ENCOUNTER — TRANSFERRED RECORDS (OUTPATIENT)
Dept: HEALTH INFORMATION MANAGEMENT | Facility: CLINIC | Age: 49
End: 2019-07-30

## 2019-08-20 ENCOUNTER — TRANSFERRED RECORDS (OUTPATIENT)
Dept: HEALTH INFORMATION MANAGEMENT | Facility: CLINIC | Age: 49
End: 2019-08-20

## 2019-09-28 ASSESSMENT — ANXIETY QUESTIONNAIRES
6. BECOMING EASILY ANNOYED OR IRRITABLE: NOT AT ALL
2. NOT BEING ABLE TO STOP OR CONTROL WORRYING: NOT AT ALL
5. BEING SO RESTLESS THAT IT IS HARD TO SIT STILL: NOT AT ALL
GAD7 TOTAL SCORE: 0
3. WORRYING TOO MUCH ABOUT DIFFERENT THINGS: NOT AT ALL
1. FEELING NERVOUS, ANXIOUS, OR ON EDGE: NOT AT ALL
IF YOU CHECKED OFF ANY PROBLEMS ON THIS QUESTIONNAIRE, HOW DIFFICULT HAVE THESE PROBLEMS MADE IT FOR YOU TO DO YOUR WORK, TAKE CARE OF THINGS AT HOME, OR GET ALONG WITH OTHER PEOPLE: NOT DIFFICULT AT ALL
7. FEELING AFRAID AS IF SOMETHING AWFUL MIGHT HAPPEN: NOT AT ALL

## 2019-09-28 ASSESSMENT — ENCOUNTER SYMPTOMS
HEADACHES: 0
WEAKNESS: 0
CHILLS: 0
JOINT SWELLING: 0
ABDOMINAL PAIN: 0
FREQUENCY: 1
COUGH: 1
ARTHRALGIAS: 1
EYE PAIN: 0
CONSTIPATION: 0
MYALGIAS: 0
BREAST MASS: 0
PALPITATIONS: 0
DIARRHEA: 0
PARESTHESIAS: 0
HEMATURIA: 0
SHORTNESS OF BREATH: 0
HEARTBURN: 0
NAUSEA: 0
SORE THROAT: 0
NERVOUS/ANXIOUS: 0
HEMATOCHEZIA: 0
DIZZINESS: 0
DYSURIA: 0

## 2019-09-28 ASSESSMENT — PATIENT HEALTH QUESTIONNAIRE - PHQ9: 5. POOR APPETITE OR OVEREATING: NOT AT ALL

## 2019-10-01 ENCOUNTER — OFFICE VISIT (OUTPATIENT)
Dept: PEDIATRICS | Facility: CLINIC | Age: 49
End: 2019-10-01
Payer: COMMERCIAL

## 2019-10-01 ENCOUNTER — TRANSFERRED RECORDS (OUTPATIENT)
Dept: HEALTH INFORMATION MANAGEMENT | Facility: CLINIC | Age: 49
End: 2019-10-01

## 2019-10-01 VITALS
TEMPERATURE: 97.9 F | OXYGEN SATURATION: 97 % | WEIGHT: 149.5 LBS | RESPIRATION RATE: 16 BRPM | HEART RATE: 78 BPM | HEIGHT: 62 IN | SYSTOLIC BLOOD PRESSURE: 120 MMHG | BODY MASS INDEX: 27.51 KG/M2 | DIASTOLIC BLOOD PRESSURE: 70 MMHG

## 2019-10-01 DIAGNOSIS — Z12.11 SPECIAL SCREENING FOR MALIGNANT NEOPLASMS, COLON: ICD-10-CM

## 2019-10-01 DIAGNOSIS — F32.1 MODERATE MAJOR DEPRESSION (H): ICD-10-CM

## 2019-10-01 DIAGNOSIS — Z00.00 ROUTINE HISTORY AND PHYSICAL EXAMINATION OF ADULT: Primary | ICD-10-CM

## 2019-10-01 DIAGNOSIS — E11.9 TYPE 2 DIABETES MELLITUS WITHOUT COMPLICATION, WITHOUT LONG-TERM CURRENT USE OF INSULIN (H): ICD-10-CM

## 2019-10-01 DIAGNOSIS — N39.46 MIXED INCONTINENCE URGE AND STRESS (MALE)(FEMALE): ICD-10-CM

## 2019-10-01 DIAGNOSIS — L30.9 DERMATITIS: ICD-10-CM

## 2019-10-01 DIAGNOSIS — R09.82 POST-NASAL DRIP: ICD-10-CM

## 2019-10-01 DIAGNOSIS — Z12.31 VISIT FOR SCREENING MAMMOGRAM: ICD-10-CM

## 2019-10-01 DIAGNOSIS — G47.33 OSA (OBSTRUCTIVE SLEEP APNEA): ICD-10-CM

## 2019-10-01 DIAGNOSIS — E78.5 HYPERLIPIDEMIA, UNSPECIFIED HYPERLIPIDEMIA TYPE: ICD-10-CM

## 2019-10-01 DIAGNOSIS — Z23 NEED FOR TDAP VACCINATION: ICD-10-CM

## 2019-10-01 DIAGNOSIS — L30.9 CHRONIC DERMATITIS: ICD-10-CM

## 2019-10-01 DIAGNOSIS — D72.829 LEUKOCYTOSIS, UNSPECIFIED TYPE: ICD-10-CM

## 2019-10-01 DIAGNOSIS — Z86.32 HISTORY OF GESTATIONAL DIABETES: ICD-10-CM

## 2019-10-01 DIAGNOSIS — R53.83 FATIGUE, UNSPECIFIED TYPE: ICD-10-CM

## 2019-10-01 LAB
BASOPHILS # BLD AUTO: 0 10E9/L (ref 0–0.2)
BASOPHILS NFR BLD AUTO: 0.3 %
DIFFERENTIAL METHOD BLD: ABNORMAL
EOSINOPHIL # BLD AUTO: 0.2 10E9/L (ref 0–0.7)
EOSINOPHIL NFR BLD AUTO: 2 %
ERYTHROCYTE [DISTWIDTH] IN BLOOD BY AUTOMATED COUNT: 13 % (ref 10–15)
ERYTHROCYTE [DISTWIDTH] IN BLOOD BY AUTOMATED COUNT: 13 % (ref 10–15)
HBA1C MFR BLD: 6.6 % (ref 0–5.6)
HCT VFR BLD AUTO: 44.9 % (ref 35–47)
HCT VFR BLD AUTO: 44.9 % (ref 35–47)
HGB BLD-MCNC: 14.6 G/DL (ref 11.7–15.7)
HGB BLD-MCNC: 14.6 G/DL (ref 11.7–15.7)
LYMPHOCYTES # BLD AUTO: 2.8 10E9/L (ref 0.8–5.3)
LYMPHOCYTES NFR BLD AUTO: 23.8 %
MCH RBC QN AUTO: 30 PG (ref 26.5–33)
MCH RBC QN AUTO: 30 PG (ref 26.5–33)
MCHC RBC AUTO-ENTMCNC: 32.5 G/DL (ref 31.5–36.5)
MCHC RBC AUTO-ENTMCNC: 32.5 G/DL (ref 31.5–36.5)
MCV RBC AUTO: 92 FL (ref 78–100)
MCV RBC AUTO: 92 FL (ref 78–100)
MONOCYTES # BLD AUTO: 0.6 10E9/L (ref 0–1.3)
MONOCYTES NFR BLD AUTO: 5.3 %
NEUTROPHILS # BLD AUTO: 8.1 10E9/L (ref 1.6–8.3)
NEUTROPHILS NFR BLD AUTO: 68.6 %
PLATELET # BLD AUTO: 234 10E9/L (ref 150–450)
PLATELET # BLD AUTO: 234 10E9/L (ref 150–450)
RBC # BLD AUTO: 4.87 10E12/L (ref 3.8–5.2)
RBC # BLD AUTO: 4.87 10E12/L (ref 3.8–5.2)
WBC # BLD AUTO: 11.9 10E9/L (ref 4–11)
WBC # BLD AUTO: 11.9 10E9/L (ref 4–11)

## 2019-10-01 PROCEDURE — 83721 ASSAY OF BLOOD LIPOPROTEIN: CPT | Mod: 59 | Performed by: PEDIATRICS

## 2019-10-01 PROCEDURE — 36415 COLL VENOUS BLD VENIPUNCTURE: CPT | Performed by: PEDIATRICS

## 2019-10-01 PROCEDURE — 99213 OFFICE O/P EST LOW 20 MIN: CPT | Mod: 25 | Performed by: PEDIATRICS

## 2019-10-01 PROCEDURE — 90715 TDAP VACCINE 7 YRS/> IM: CPT | Performed by: PEDIATRICS

## 2019-10-01 PROCEDURE — 83036 HEMOGLOBIN GLYCOSYLATED A1C: CPT | Performed by: PEDIATRICS

## 2019-10-01 PROCEDURE — 80061 LIPID PANEL: CPT | Performed by: PEDIATRICS

## 2019-10-01 PROCEDURE — 90682 RIV4 VACC RECOMBINANT DNA IM: CPT | Performed by: PEDIATRICS

## 2019-10-01 PROCEDURE — 90472 IMMUNIZATION ADMIN EACH ADD: CPT | Performed by: PEDIATRICS

## 2019-10-01 PROCEDURE — 99396 PREV VISIT EST AGE 40-64: CPT | Mod: 25 | Performed by: PEDIATRICS

## 2019-10-01 PROCEDURE — 90471 IMMUNIZATION ADMIN: CPT | Performed by: PEDIATRICS

## 2019-10-01 PROCEDURE — 84443 ASSAY THYROID STIM HORMONE: CPT | Performed by: PEDIATRICS

## 2019-10-01 PROCEDURE — 80053 COMPREHEN METABOLIC PANEL: CPT | Performed by: PEDIATRICS

## 2019-10-01 PROCEDURE — 85027 COMPLETE CBC AUTOMATED: CPT | Performed by: PEDIATRICS

## 2019-10-01 PROCEDURE — 85025 COMPLETE CBC W/AUTO DIFF WBC: CPT | Performed by: PEDIATRICS

## 2019-10-01 RX ORDER — LORATADINE 10 MG/1
1 TABLET ORAL DAILY
Qty: 90 TABLET | Refills: 3 | Status: SHIPPED | OUTPATIENT
Start: 2019-10-01 | End: 2020-02-11

## 2019-10-01 RX ORDER — FLUOCINONIDE 0.5 MG/G
OINTMENT TOPICAL
Qty: 30 G | Refills: 2 | Status: SHIPPED | OUTPATIENT
Start: 2019-10-01 | End: 2020-10-27

## 2019-10-01 RX ORDER — TRIAMCINOLONE ACETONIDE 1 MG/G
OINTMENT TOPICAL
Qty: 30 G | Refills: 1 | Status: SHIPPED | OUTPATIENT
Start: 2019-10-01

## 2019-10-01 RX ORDER — OXYBUTYNIN CHLORIDE 10 MG/1
10 TABLET, EXTENDED RELEASE ORAL DAILY
Qty: 90 TABLET | Refills: 3 | Status: SHIPPED | OUTPATIENT
Start: 2019-10-01 | End: 2020-10-12

## 2019-10-01 ASSESSMENT — ENCOUNTER SYMPTOMS
COUGH: 1
ARTHRALGIAS: 1
EYE PAIN: 0
NAUSEA: 0
JOINT SWELLING: 0
DIZZINESS: 0
DIARRHEA: 0
FREQUENCY: 1
MYALGIAS: 0
BREAST MASS: 0
SORE THROAT: 0
HEMATURIA: 0
ABDOMINAL PAIN: 0
HEMATOCHEZIA: 0
CHILLS: 0
WEAKNESS: 0
PARESTHESIAS: 0
HEADACHES: 0
SHORTNESS OF BREATH: 0
PALPITATIONS: 0
NERVOUS/ANXIOUS: 0
HEARTBURN: 0
DYSURIA: 0
CONSTIPATION: 0

## 2019-10-01 ASSESSMENT — PATIENT HEALTH QUESTIONNAIRE - PHQ9: SUM OF ALL RESPONSES TO PHQ QUESTIONS 1-9: 3

## 2019-10-01 ASSESSMENT — MIFFLIN-ST. JEOR: SCORE: 1249.56

## 2019-10-01 NOTE — PATIENT INSTRUCTIONS
Please  your FIT test for colon cancer screening from the lab and return in the mail at your earliest convenience    Schedule your screening mammogram.  You can do this at our clinic.  Schedule with my schedulers at [phone] or we can help you with this before you leave.    Labs today    Tdap and Flu shot today    Call for visit with sleep medicine

## 2019-10-01 NOTE — PROGRESS NOTES
SUBJECTIVE:   CC: Leti Grover is an 49 year old woman who presents for preventive health visit.     Healthy Habits:     Getting at least 3 servings of Calcium per day:  Yes    Bi-annual eye exam:  Yes    Dental care twice a year:  Yes    Sleep apnea or symptoms of sleep apnea:  Daytime drowsiness and Sleep apnea    Diet:  Regular (no restrictions)    Frequency of exercise:  2-3 days/week    Duration of exercise:  Greater than 60 minutes    Taking medications regularly:  Yes    Medication side effects:  None    PHQ-2 Total Score: 0    Additional concerns today:  No      Today's PHQ-2 Score:   PHQ-2 ( 1999 [employer]) 9/28/2019   Q1: Little interest or pleasure in doing things 0   Q2: Feeling down, depressed or hopeless 0   PHQ-2 Score 0   Q1: Little interest or pleasure in doing things Not at all   Q2: Feeling down, depressed or hopeless Not at all   PHQ-2 Score 0       Abuse: Current or Past(Physical, Sexual or Emotional)- No  Do you feel safe in your environment? Yes    Social History     Tobacco Use     Smoking status: Never Smoker     Smokeless tobacco: Never Used   Substance Use Topics     Alcohol use: No     Alcohol/week: 0.0 standard drinks     Frequency: Never     Drinks per session: Patient refused     Binge frequency: Never       Alcohol Use 9/28/2019   Prescreen: >3 drinks/day or >7 drinks/week? Not Applicable   Prescreen: >3 drinks/day or >7 drinks/week? -       Reviewed orders with patient.  Reviewed health maintenance and updated orders accordingly - Yes    Mammogram Screening: Patient over age 50, mutual decision to screen reflected in health maintenance.    Pertinent mammograms are reviewed under the imaging tab.  History of abnormal Pap smear: YES - updated in Problem List and Health Maintenance accordingly  PAP / HPV Latest Ref Rng & Units 7/8/2019 12/7/2017 9/15/2014   PAP - NIL ASC-H(A) NIL   HPV 16 DNA NEG:Negative Negative Negative -   HPV 18 DNA NEG:Negative Negative Negative -   OTHER HR  HPV NEG:Negative Negative Negative -     Reviewed and updated as needed this visit by clinical staff  Tobacco  Allergies  Meds  Med Hx  Surg Hx  Fam Hx  Soc Hx        Reviewed and updated as needed this visit by Provider  Meds        Left scapular fracture this summer from fall while roller skating.  Still with some limitations in ROM, doing physical therapy.  Improving, but still sore.    YAKELIN - CPAP with nose face mask.   Last seen by sleep medicine before last pregnancy, almost 10 years ago.  Has been feeling more tired.      Depression - has been well controlled on current medications.    PHQ-9 SCORE 9/18/2017 10/1/2018 9/28/2019   PHQ-9 Total Score - - -   PHQ-9 Total Score 5 3 3     BIPIN-7 SCORE 3/9/2017 9/18/2017 9/28/2019   Total Score - - -   Total Score 2 2 0       Feeling fatigued - huge responsibilities taking care of her sons with special needs.  Stays up after their bedtime to get some time to herself.    Hx of gestational diabetes, last HgbA1C was 6.3%.  Due for repeat labs today.    Had visit with Dr Meza in Ob/gyn in July - pap smear at that time, plan for repeat diagnostic pap and HPV in 1 year.  Normal breast exam at that visit    Urinary incontinence - oxybutynin helps a little bit.      HRT - discussed with Dr Meza this summer.  Weaned off of HRT and has been asymptomatic with regard to menopause symptoms.    Rollerskating for exercise.  Diet changes have been difficult - works at [employer], so heavy carb intake, loves junk foods and eats a lot of convenience foods.    Eczema - uses steroid ointments behind ears        Review of Systems   Constitutional: Negative for chills.   HENT: Negative for congestion, ear pain, hearing loss and sore throat.    Eyes: Negative for pain and visual disturbance.   Respiratory: Positive for cough. Negative for shortness of breath.    Cardiovascular: Negative for chest pain, palpitations and peripheral edema.   Gastrointestinal: Negative for abdominal pain,  "constipation, diarrhea, heartburn, hematochezia and nausea.   Breasts:  Negative for tenderness, breast mass and discharge.   Genitourinary: Positive for frequency and urgency. Negative for dysuria, genital sores, hematuria, pelvic pain, vaginal bleeding and vaginal discharge.   Musculoskeletal: Positive for arthralgias. Negative for joint swelling and myalgias.   Skin: Positive for rash.   Neurological: Negative for dizziness, weakness, headaches and paresthesias.   Psychiatric/Behavioral: Negative for mood changes. The patient is not nervous/anxious.         OBJECTIVE:   /70 (BP Location: Right arm, Patient Position: Sitting, Cuff Size: Adult Regular)   Pulse 78   Temp 97.9  F (36.6  C) (Tympanic)   Resp 16   Ht 1.58 m (5' 2.2\")   Wt 67.8 kg (149 lb 8 oz)   LMP 08/20/2014   SpO2 97%   BMI 27.17 kg/m     Wt Readings from Last 4 Encounters:   10/01/19 67.8 kg (149 lb 8 oz)   07/08/19 67.3 kg (148 lb 6.4 oz)   10/01/18 69.1 kg (152 lb 4.8 oz)   02/08/18 68.6 kg (151 lb 4.8 oz)       Physical Exam  GENERAL: healthy, alert and no distress  EYES: Eyes grossly normal to inspection, PERRL and conjunctivae and sclerae normal  HENT: ear canals and TM's normal, nose and mouth without ulcers or lesions  NECK: no adenopathy, no asymmetry, masses, or scars and thyroid normal to palpation  RESP: lungs clear to auscultation - no rales, rhonchi or wheezes  CV: regular rate and rhythm, normal S1 S2, no S3 or S4, no murmur, click or rub, no peripheral edema and peripheral pulses strong  ABDOMEN: soft, nontender, no hepatosplenomegaly, no masses and bowel sounds normal  MS: no gross musculoskeletal defects noted, no edema  SKIN: patches of scaling erythema over right hip and right lower quadrant of abdomen  NEURO: Normal strength and tone, mentation intact and speech normal  PSYCH: mentation appears normal, affect normal/bright    Diagnostic Test Results:  pending    ASSESSMENT/PLAN:       ICD-10-CM    1. Routine " history and physical examination of adult Z00.00 Lipid panel reflex to direct LDL Fasting     Comprehensive metabolic panel   2. YAKELIN (obstructive sleep apnea) G47.33 SLEEP EVALUATION & MANAGEMENT REFERRAL - ADULT -[facility]  [phone] (Age 18 and up)  Time for sleep follow up visit   3. Moderate major depression (H) F32.1 FLUoxetine (PROZAC) 20 MG capsule  Well controlled, continue current medications     4. History of gestational diabetes Z86.32 Hemoglobin A1C recheck, encouraged to keep exercising and adjust diet   5. Hyperlipidemia, unspecified hyperlipidemia type E78.5 Hemoglobin A1c, recheck lipids today   6. Special screening for malignant neoplasms, colon Z12.11 Fecal colorectal cancer screen (FIT)   7. Visit for screening mammogram Z12.31 *MA Screening Digital Bilateral   8. Need for Tdap vaccination Z23 TDAP VACCINE   9. Mixed incontinence urge and stress (male)(female) N39.46 oxybutynin ER (DITROPAN-XL) 10 MG 24 hr tablet  Doing ok on this medication - to alert me if needing a change or wanting to discuss with urology   10. Fatigue, unspecified type R53.83 Comprehensive metabolic panel     TSH with free T4 reflex     CBC with platelets  Additional lab work today, though suspect that fatigue may be related to need for adjustment in CPAP mask/settings and insufficient sleep time due to care responsibilities   11. Chronic dermatitis L30.9 fluocinonide (LIDEX) 0.05 % external ointment  Instructed not to use on face, to alert me if not improving   12. Dermatitis L30.9 triamcinolone (KENALOG) 0.1 % external ointment   13. Post-nasal drip R09.82 loratadine (CLARITIN) 10 MG tablet  Well controlled, continue current medications         COUNSELING:  Special attention given to:        Regular exercise       Healthy diet/nutrition       Vision screening       Immunizations    Vaccinated for: Influenza and TDAP             Colon cancer screening       (Veronica)menopause management    Estimated  "body mass index is 27.17 kg/m  as calculated from the following:    Height as of this encounter: 1.58 m (5' 2.2\").    Weight as of this encounter: 67.8 kg (149 lb 8 oz).    Weight management plan: Discussed healthy diet and exercise guidelines     reports that she has never smoked. She has never used smokeless tobacco.      Counseling Resources:  ATP IV Guidelines  Pooled Cohorts Equation Calculator  Breast Cancer Risk Calculator  FRAX Risk Assessment  ICSI Preventive Guidelines  Dietary Guidelines for Americans, 2010  USDA's MyPlate  ASA Prophylaxis  Lung CA Screening    Mary Ellen Wise MD  [facility]  "

## 2019-10-02 DIAGNOSIS — Z12.31 VISIT FOR SCREENING MAMMOGRAM: ICD-10-CM

## 2019-10-02 PROBLEM — E11.9 DIABETES MELLITUS, TYPE 2 (H): Status: ACTIVE | Noted: 2019-10-02

## 2019-10-02 LAB
ALBUMIN SERPL-MCNC: 3.9 G/DL (ref 3.4–5)
ALP SERPL-CCNC: 82 U/L (ref 40–150)
ALT SERPL W P-5'-P-CCNC: 76 U/L (ref 0–50)
ANION GAP SERPL CALCULATED.3IONS-SCNC: 7 MMOL/L (ref 3–14)
AST SERPL W P-5'-P-CCNC: 26 U/L (ref 0–45)
BILIRUB SERPL-MCNC: 0.6 MG/DL (ref 0.2–1.3)
BUN SERPL-MCNC: 20 MG/DL (ref 7–30)
CALCIUM SERPL-MCNC: 9.1 MG/DL (ref 8.5–10.1)
CHLORIDE SERPL-SCNC: 106 MMOL/L (ref 94–109)
CHOLEST SERPL-MCNC: 244 MG/DL
CO2 SERPL-SCNC: 26 MMOL/L (ref 20–32)
CREAT SERPL-MCNC: 0.78 MG/DL (ref 0.52–1.04)
GFR SERPL CREATININE-BSD FRML MDRD: 88 ML/MIN/{1.73_M2}
GLUCOSE SERPL-MCNC: 121 MG/DL (ref 70–99)
HDLC SERPL-MCNC: 33 MG/DL
LDLC SERPL CALC-MCNC: ABNORMAL MG/DL
LDLC SERPL DIRECT ASSAY-MCNC: 166 MG/DL
NONHDLC SERPL-MCNC: 211 MG/DL
POTASSIUM SERPL-SCNC: 4.1 MMOL/L (ref 3.4–5.3)
PROT SERPL-MCNC: 7.2 G/DL (ref 6.8–8.8)
SODIUM SERPL-SCNC: 139 MMOL/L (ref 133–144)
TRIGL SERPL-MCNC: 431 MG/DL
TSH SERPL DL<=0.005 MIU/L-ACNC: 0.66 MU/L (ref 0.4–4)

## 2019-10-02 PROCEDURE — 77067 SCR MAMMO BI INCL CAD: CPT | Mod: TC

## 2019-10-02 RX ORDER — METFORMIN HCL 500 MG
500 TABLET, EXTENDED RELEASE 24 HR ORAL 2 TIMES DAILY WITH MEALS
Qty: 60 TABLET | Refills: 5 | Status: SHIPPED | OUTPATIENT
Start: 2019-10-02 | End: 2019-10-28

## 2019-10-02 ASSESSMENT — ANXIETY QUESTIONNAIRES: GAD7 TOTAL SCORE: 0

## 2019-10-03 ENCOUNTER — TELEPHONE (OUTPATIENT)
Dept: PEDIATRICS | Facility: CLINIC | Age: 49
End: 2019-10-03

## 2019-10-03 NOTE — TELEPHONE ENCOUNTER
Diabetes Education Scheduling Outreach #1:    Call to patient to schedule. Left message with phone number to call to schedule.    Plan for 2nd outreach attempt within 1 week.    Jayla Pacheco OnCall  Diabetes and Nutrition Scheduling

## 2019-10-04 ENCOUNTER — MYC REFILL (OUTPATIENT)
Dept: PEDIATRICS | Facility: CLINIC | Age: 49
End: 2019-10-04

## 2019-10-04 ENCOUNTER — MYC MEDICAL ADVICE (OUTPATIENT)
Dept: PEDIATRICS | Facility: CLINIC | Age: 49
End: 2019-10-04

## 2019-10-04 DIAGNOSIS — R73.03 PREDIABETES: ICD-10-CM

## 2019-10-04 DIAGNOSIS — E11.9 DIABETES MELLITUS, TYPE 2 (H): Primary | ICD-10-CM

## 2019-10-04 DIAGNOSIS — E11.9 TYPE 2 DIABETES MELLITUS WITHOUT COMPLICATION, WITHOUT LONG-TERM CURRENT USE OF INSULIN (H): ICD-10-CM

## 2019-10-04 RX ORDER — LANCETS
EACH MISCELLANEOUS
Qty: 100 EACH | Refills: 3 | Status: SHIPPED | OUTPATIENT
Start: 2019-10-04 | End: 2020-07-28

## 2019-10-07 NOTE — TELEPHONE ENCOUNTER
Dr. Wise:    Patient sent a mychart requesting a new blood glucose monitor to test BGM.     Order cued for your review/signature if okay at this time.      Thank you,   Mercedez Perez RN BSN   [facility]

## 2019-10-11 DIAGNOSIS — Z12.11 SPECIAL SCREENING FOR MALIGNANT NEOPLASMS, COLON: ICD-10-CM

## 2019-10-11 PROCEDURE — 82274 ASSAY TEST FOR BLOOD FECAL: CPT | Performed by: PEDIATRICS

## 2019-10-13 LAB — HEMOCCULT STL QL IA: NEGATIVE

## 2019-10-28 ENCOUNTER — ALLIED HEALTH/NURSE VISIT (OUTPATIENT)
Dept: EDUCATION SERVICES | Facility: CLINIC | Age: 49
End: 2019-10-28
Payer: COMMERCIAL

## 2019-10-28 DIAGNOSIS — E11.9 TYPE 2 DIABETES MELLITUS WITHOUT COMPLICATION, WITHOUT LONG-TERM CURRENT USE OF INSULIN (H): Primary | ICD-10-CM

## 2019-10-28 PROCEDURE — G0108 DIAB MANAGE TRN  PER INDIV: HCPCS

## 2019-10-28 RX ORDER — METFORMIN HCL 500 MG
500 TABLET, EXTENDED RELEASE 24 HR ORAL
Qty: 60 TABLET | Refills: 5
Start: 2019-10-28 | End: 2020-03-09

## 2019-10-28 NOTE — PATIENT INSTRUCTIONS
Care Plan:    Meal Plan Recommendation: use portion control, aim for no more than 3-4 carbohydrate choices (45-60 gms) per meal, and 0-2 choices (0-30 gms) per snack. Increase water intake to 2 bottles per day.     Check blood sugars: twice daily, recommend check some post breakfast and/or post evening meal blood sugars  (A1c target: less than 7%, Blood sugar target before meals:  mg/dl, 2 hrs after the start of a meal: less than 180 mg/dl)    Exercise / activity plan: continue current exercise    Recommend change to medication regimen:  Continue Metformin 1 tab daily with evening meal, can stop the morning dose    Please let us know if any blood sugar concerns    Follow up:  Please call, e-mail,  or send [employer] message with a blood sguar update in 2-3 weeks.    Bring blood glucose meter and/or logbook with you to all doctor and follow-up appointments.     Sabrina Britt RD, LD, CDE  Diabetes     [facility] for the [facility] Area:    For Your Diabetes or Nutrition Education Appointments Call:  [phone]   For Diabetes or Nutrition Related Questions Call or Email:   [phone]  [email or web address]  Fax: [phone]   If you need a medication refill please contact your pharmacy. Please allow 3 business days for your refills to be completed.

## 2019-10-28 NOTE — PROGRESS NOTES
"Diabetes Self-Management Education & Support    Diabetes Education Self Management & Training    SUBJECTIVE/OBJECTIVE:  Diabetes education in the past 24mo: (P) No  Diabetes type: (P) Type 2  Disease course: (P) Improving  Cultural Influences/Ethnic Background:  American    Diabetes Symptoms & Complications  Blurred vision: (P) No  Fatigue: (P) No  Foot ulcerations: (P) No  Polydipsia: (P) Yes  Polyphagia: (P) No  Polyuria: (P) Yes  Visual change: (P) No  Weakness: (P) No  Weight loss: (P) Yes  Slow healing wounds: (P) No  Weight trend: (P) Decreasing steadily  Autonomic neuropathy: (P) No  CVA: (P) No  Heart disease: (P) No  Nephropathy: (P) No  Peripheral neuropathy: (P) No  Peripheral Vascular Disease: (P) No  Retinopathy: (P) No  Sexual dysfunction: (P) No    Patient Problem List and Family Medical History reviewed for relevant medical history, current medical status, and diabetes risk factors.    Vitals:  LMP 08/20/2014   Estimated body mass index is 27.17 kg/m  as calculated from the following:    Height as of 10/1/19: 1.58 m (5' 2.2\").    Weight as of 10/1/19: 67.8 kg (149 lb 8 oz).   Last 3 BP:   BP Readings from Last 3 Encounters:   10/01/19 120/70   07/22/19 150/70   07/08/19 114/70       History   Smoking Status     Never Smoker   Smokeless Tobacco     Never Used       Labs:  Lab Results   Component Value Date    A1C 6.6 10/01/2019     Lab Results   Component Value Date     10/01/2019     Lab Results   Component Value Date    LDL  10/01/2019     Cannot estimate LDL when triglyceride exceeds 400 mg/dL     10/01/2019     HDL Cholesterol   Date Value Ref Range Status   10/01/2019 33 (L) >49 mg/dL Final   ]  GFR Estimate   Date Value Ref Range Status   10/01/2019 88 >60 mL/min/[1.73_m2] Final     Comment:     Non  GFR Calc  Starting 12/18/2018, serum creatinine based estimated GFR (eGFR) will be   calculated using the Chronic Kidney Disease Epidemiology Collaboration   (CKD-EPI) " equation.       GFR Estimate If Black   Date Value Ref Range Status   10/01/2019 >90 >60 mL/min/[1.73_m2] Final     Comment:      GFR Calc  Starting 12/18/2018, serum creatinine based estimated GFR (eGFR) will be   calculated using the Chronic Kidney Disease Epidemiology Collaboration   (CKD-EPI) equation.       Lab Results   Component Value Date    CR 0.78 10/01/2019     No results found for: MICROALBUMIN    Healthy Eating  Healthy Eating Assessed Today: (P) Yes  Cultural/Christianity diet restrictions?: (P) No  Meal planning: (P) Smaller portions  Meals include: (P) Breakfast, Lunch, Snacks  Breakfast: (P) bowl of cereal (2-3 cups frosted mini wheats), bagel with butter  Lunch: (P) burger or chili or chow mein with rice and noodles  Dinner: (P) usually a snack  Snacks: (P) frosted mini wheats (dry with sips   Beverages: (P) Water, Milk, Diet soda  Has patient met with a dietitian in the past?: (P) No    Being Active  Being Active Assessed Today: (P) Yes(likes to roller skae 2x/week)  Exercise:: (P) Yes  How intense was your typical exercise? : (P) Moderate (like brisk walking)  Barrier to exercise: (P) Time, Other    Monitoring  Monitoring Assessed Today: (P) Yes  Did patient bring glucose meter to appointment? : (P) Yes  Blood Glucose Meter: (P) Accu-check  Home Glucose (Sugar) Monitoring: (P) 1-2 times per day  Blood glucose trend: (P) Decreasing steadily  Low Glucose Range (mg/dL): (P) 70-90  High Glucose Range (mg/dL): (P) 140-180  Overall Range (mg/dL): (P)         Taking Medications  Diabetes Medication(s)     Biguanides       metFORMIN (GLUCOPHAGE-XR) 500 MG 24 hr tablet    Take 1 tablet (500 mg) by mouth 2 times daily (with meals) - pt reports often skips the morning dose        Taking Medication Assessed Today: (P) Yes  Current Treatments: (P) Diet, Oral Agent (monotherapy)    Problem Solving  Problem Solving Assessed Today: (P) Yes  Hypoglycemia Frequency: (P) Never  Medical alert: (P)  "No  Severe weather/disaster plan for diabetes management?: (P) No  DKA prevention plan?: (P) No  Sick day plan for diabetes management?: (P) No    Reducing Risks  CAD Risks: (P) Diabetes Mellitus, Hypertension, Obesity, Post-menopausal  Has dilated eye exam at least once a year?: (P) Yes  Sees dentist every 6 months?: (P) Yes  Sees podiatrist (foot doctor)?: (P) No    Healthy Coping  Healthy Coping Assessed Today: (P) Yes  Informal Support system:: (P) [place] based, Friends, Spouse  Difficulty affording diabetes management supplies?: (P) No  Patient Activation Measure Survey Score:  BLAYNE Score (Last Two) 11/10/2009 9/28/2019   BLAYNE Raw Score 42 29   Activation Score 66 52.9   BLAYNE Level 3 2       ASSESSMENT:    Pt is inconsistent with taking Metformin due to concern with morning blood sugars \"being low\".  BG values are typically in target. Reports misses the morning Metformin dose ~4x/week. May benefit from a consistent dose- try one tab daily, and then re-assess for need to increase.     Hx of GDM, is comfortable with SMBG and benefits of exercise.     Current diet is high in carbs (eg breakfast is >120 gms and snacks on dry, sweetended cereal and drinks milk), though pt is trying to reduce intake of candy and sweets. Pt would like to work on more water and vegetables, would benefit from paying closer attention to how food affects blood sugar- eg do some post meal BG monitoring, and reduce carb intake.       Goals Addressed as of 10/28/2019 at 5:42 PM        Patient Stated      Healthy Eating (pt-stated)     Added 10/28/19 by Sabrina Britt RD     My Goal: I will increase my water intake    What I need to meet my goal: have water bottles handy, aim for 2 each day    I plan to meet my goal by this date: 1 month              Monitoring (pt-stated)     Added 10/28/19 by Sabrina Britt RD     My Goal: I will increase my awareness of total carb intake and pay attention to how it affects my blood sugar    What I need to " meet my goal: monitor 2 hours after a high carb meal (breakfast)    I plan to meet my goal by this date:  1 month            Patient's most recent   Lab Results   Component Value Date    A1C 6.6 10/01/2019    is meeting goal of <7.0    INTERVENTION:   Diabetes knowledge and skills assessment:     Patient is knowledgeable in diabetes management concepts related to: new dx    Patient needs further education on the following diabetes management concepts: Healthy Eating, Being Active, Monitoring, Taking Medication, Problem Solving, Reducing Risks and Healthy Coping    Based on learning assessment above, most appropriate setting for further diabetes education would be: Group class or Individual setting.    Education provided today on:  AADE Self-Care Behaviors:  Healthy Eating: carbohydrate counting, consistency in amount, composition, and timing of food intake, portion control and label reading  Being Active: relationship to blood glucose  Monitoring: log and interpret results and individual blood glucose targets  Taking Medication: action of prescribed medication  Problem Solving: high blood glucose - causes, signs/symptoms, treatment and prevention and when to call health care provider  Reducing Risks: A1C - goals, relating to blood glucose levels, how often to check  Healthy Coping: recognize feelings about diagnosis and utilize support systems    Opportunities for ongoing education and support in diabetes-self management were discussed.    Pt verbalized understanding of concepts discussed and recommendations provided today.       Education Materials Provided:  [place] Understanding Diabetes Booklet, Safe Disposal Options for Needles & Syringes and BG Log Sheet    PLAN:  See Patient Instructions for co-developed, patient-stated behavior change goals.  AVS printed and provided to patient today. See Follow-Up section for recommended follow-up.    Sabrina Britt RD, CDE  Diabetes     Time Spent: 60  minutes  Encounter Type: Individual    Any diabetes medication dose changes were made via the CDE Protocol and Collaborative Practice Agreement with the patient's primary care provider. A copy of this encounter was shared with the provider.

## 2019-10-29 ENCOUNTER — TRANSFERRED RECORDS (OUTPATIENT)
Dept: HEALTH INFORMATION MANAGEMENT | Facility: CLINIC | Age: 49
End: 2019-10-29

## 2019-11-03 ENCOUNTER — HEALTH MAINTENANCE LETTER (OUTPATIENT)
Age: 49
End: 2019-11-03

## 2019-11-11 NOTE — PATIENT INSTRUCTIONS
Pediatric Dermatology  [address] [place] [address]  [phone]    [facility]      You have been prescribed a medication(s) that will be sent to our [facility].     Please call the pharmacy at [phone] with 24-48 hours of being seen in clinic to get registered in their system and provide them with your insurance information.    Once you are registered in their system, they will do a benefits check and contact you with cost or questions before any medication is billed or mailed.     The medication will be mailed to you. This is done at no additional cost to you.    Squaric Acid Dibutylester (SADBE) for Home Immunotherapy of Common Warts    General Information:  Squaric acid is an immunotherapy used to treat common wart or alopecia areata. This treatment is non-toxic and easy to use. The treatment activates your immune system which may cause irritation or dermatitis.  The immune response helps your body recognize and attack the virus that is causing the warts. This reaction can be managed with a topical steroid ointment. Blistering and lightening of the skin may occur, but rarely.    FIRST STEP: Office Sensitization: 2% SABDE will be applied to a dime-sized area of the upper arm by the doctor or nurse.  This will remain in place until the following morning at which time you can wash it off, sooner if significant redness or irritation noted. A small amount of topical steroid, such as over the counter hydrocortisone 1% ointment, can be used to treat this if it gets red or itchy.     SECOND STEP: Get squaric acid from the pharmacy: Your doctor will send a prescription of diluted squaric acid to the [facility] pharmacy. Once the medication is ready, it will be mailed to your home. From the time it is ordered, it takes 7-14 days for the pharmacy to prepare it and mail it to your home. Sometimes this medication is not covered by insurance.  If this happens and you  are unable or do not wish to pay out of pocket for the medication, please call our clinic to discuss different treatment options.    Starting therapy:  1. Patient to start applying a small amount of 0.2% squaric acid (SADBE) by using a cotton-tipped applicator. You should start out using it every other night. If tolerating and not experiencing significant redness and irritation, increase application to nightly.  2. Apply a small amount with a cotton-tipped applicator to warts each day.  3. Avoid application to facial or genital warts unless instructed to do so by the doctor.  4. The medicine is drippy and evaporates easily, so application should be quick and be performed with care not to spill it or drip it. Keep this medication in the refrigerator.    Note: If you develop blistering or severe irritation during therapy with SADBE, discontinue therapy and contact the Pediatric Dermatology Clinic for instructions.    What to expect: Responders can expect improvements as early as one month, and clearance as early as three months. No change will generally be seen for the first 4 weeks of therapy. If there is no reaction, do not stop the medication because you do not think it is working. Just be patient.     For questions: If you have questions or concerns, please contact the clinic at [phone].     no

## 2019-11-13 ENCOUNTER — TELEPHONE (OUTPATIENT)
Dept: PEDIATRICS | Facility: CLINIC | Age: 49
End: 2019-11-13

## 2019-11-13 NOTE — TELEPHONE ENCOUNTER
Panel Management Review      Patient has the following on her problem list:     Depression / Dysthymia review    Measure:  Needs PHQ-9 score of 4 or less during index window.  Administer PHQ-9 and if score is 5 or more, send encounter to provider for next steps.    5 - 7 month window range:  Within range     PHQ-9 SCORE 9/18/2017 10/1/2018 9/28/2019   PHQ-9 Total Score - - -   PHQ-9 Total Score 5 3 3       If PHQ-9 recheck is 5 or more, route to provider for next steps.    Patient is due for:  None    Diabetes    ASA: Passed    Last A1C  Lab Results   Component Value Date    A1C 6.6 10/01/2019    A1C 6.3 10/09/2018    A1C 6.3 09/18/2017    A1C 6.3 09/12/2016    A1C 6.2 08/10/2015     A1C tested: MONITOR    Last LDL:    Lab Results   Component Value Date    CHOL 244 10/01/2019     Lab Results   Component Value Date    HDL 33 10/01/2019     Lab Results   Component Value Date    LDL  10/01/2019     Cannot estimate LDL when triglyceride exceeds 400 mg/dL     10/01/2019     Lab Results   Component Value Date    TRIG 431 10/01/2019     Lab Results   Component Value Date    CHOLHDLRATIO 6.3 07/15/2014     Lab Results   Component Value Date    NHDL 211 10/01/2019       Is the patient on a Statin? NO             Is the patient on Aspirin? NO        Last three blood pressure readings:  BP Readings from Last 3 Encounters:   10/01/19 120/70   07/22/19 150/70   07/08/19 114/70       Date of last diabetes office visit: 10/01/2019     Tobacco History:     History   Smoking Status     Never Smoker   Smokeless Tobacco     Never Used           Composite cancer screening  Chart review shows that this patient is due/due soon for the following None  Summary:    Patient is due/failing the following:   Need to know date of patients most recent eye exam.     Action needed:   Need for diabetic eye exam     Type of outreach:    Phone, spoke to patient.  patient states that she has scheduled her appointment, but has not had it yet.  Will ask patient come January of 2020 if she has had it yet.     Questions for provider review:    None                                                                                                                                    Marilyn Georges, [facility]     Chart routed to Care Team .

## 2019-12-10 ENCOUNTER — TRANSFERRED RECORDS (OUTPATIENT)
Dept: HEALTH INFORMATION MANAGEMENT | Facility: CLINIC | Age: 49
End: 2019-12-10

## 2019-12-10 LAB — RETINOPATHY: NEGATIVE

## 2020-01-02 DIAGNOSIS — E11.9 TYPE 2 DIABETES MELLITUS WITHOUT COMPLICATION, WITHOUT LONG-TERM CURRENT USE OF INSULIN (H): ICD-10-CM

## 2020-01-02 LAB
ALBUMIN SERPL-MCNC: 3.8 G/DL (ref 3.4–5)
ALP SERPL-CCNC: 96 U/L (ref 40–150)
ALT SERPL W P-5'-P-CCNC: 73 U/L (ref 0–50)
ANION GAP SERPL CALCULATED.3IONS-SCNC: 8 MMOL/L (ref 3–14)
AST SERPL W P-5'-P-CCNC: 38 U/L (ref 0–45)
BILIRUB SERPL-MCNC: 0.7 MG/DL (ref 0.2–1.3)
BUN SERPL-MCNC: 14 MG/DL (ref 7–30)
CALCIUM SERPL-MCNC: 9 MG/DL (ref 8.5–10.1)
CHLORIDE SERPL-SCNC: 107 MMOL/L (ref 94–109)
CHOLEST SERPL-MCNC: 255 MG/DL
CO2 SERPL-SCNC: 23 MMOL/L (ref 20–32)
CREAT SERPL-MCNC: 0.76 MG/DL (ref 0.52–1.04)
GFR SERPL CREATININE-BSD FRML MDRD: >90 ML/MIN/{1.73_M2}
GLUCOSE SERPL-MCNC: 104 MG/DL (ref 70–99)
HBA1C MFR BLD: 6.4 % (ref 0–5.6)
HDLC SERPL-MCNC: 38 MG/DL
LDLC SERPL CALC-MCNC: 169 MG/DL
NONHDLC SERPL-MCNC: 217 MG/DL
POTASSIUM SERPL-SCNC: 4.1 MMOL/L (ref 3.4–5.3)
PROT SERPL-MCNC: 7.5 G/DL (ref 6.8–8.8)
SODIUM SERPL-SCNC: 138 MMOL/L (ref 133–144)
TRIGL SERPL-MCNC: 242 MG/DL

## 2020-01-02 PROCEDURE — 80061 LIPID PANEL: CPT | Performed by: PEDIATRICS

## 2020-01-02 PROCEDURE — 36415 COLL VENOUS BLD VENIPUNCTURE: CPT | Performed by: PEDIATRICS

## 2020-01-02 PROCEDURE — 83036 HEMOGLOBIN GLYCOSYLATED A1C: CPT | Performed by: PEDIATRICS

## 2020-01-02 PROCEDURE — 80053 COMPREHEN METABOLIC PANEL: CPT | Performed by: PEDIATRICS

## 2020-03-06 ASSESSMENT — PATIENT HEALTH QUESTIONNAIRE - PHQ9
10. IF YOU CHECKED OFF ANY PROBLEMS, HOW DIFFICULT HAVE THESE PROBLEMS MADE IT FOR YOU TO DO YOUR WORK, TAKE CARE OF THINGS AT HOME, OR GET ALONG WITH OTHER PEOPLE: SOMEWHAT DIFFICULT
SUM OF ALL RESPONSES TO PHQ QUESTIONS 1-9: 1
SUM OF ALL RESPONSES TO PHQ QUESTIONS 1-9: 1

## 2020-03-07 ASSESSMENT — PATIENT HEALTH QUESTIONNAIRE - PHQ9: SUM OF ALL RESPONSES TO PHQ QUESTIONS 1-9: 1

## 2020-03-09 ENCOUNTER — OFFICE VISIT (OUTPATIENT)
Dept: PEDIATRICS | Facility: CLINIC | Age: 50
End: 2020-03-09
Payer: COMMERCIAL

## 2020-03-09 VITALS
DIASTOLIC BLOOD PRESSURE: 72 MMHG | OXYGEN SATURATION: 96 % | BODY MASS INDEX: 27.79 KG/M2 | WEIGHT: 151 LBS | HEIGHT: 62 IN | HEART RATE: 93 BPM | SYSTOLIC BLOOD PRESSURE: 98 MMHG | TEMPERATURE: 99 F

## 2020-03-09 DIAGNOSIS — E11.9 TYPE 2 DIABETES MELLITUS WITHOUT COMPLICATION, WITHOUT LONG-TERM CURRENT USE OF INSULIN (H): Primary | ICD-10-CM

## 2020-03-09 DIAGNOSIS — E78.5 HYPERLIPIDEMIA LDL GOAL <160: ICD-10-CM

## 2020-03-09 DIAGNOSIS — F32.1 MODERATE MAJOR DEPRESSION (H): ICD-10-CM

## 2020-03-09 PROCEDURE — 99214 OFFICE O/P EST MOD 30 MIN: CPT | Performed by: PEDIATRICS

## 2020-03-09 RX ORDER — PRAVASTATIN SODIUM 20 MG
20 TABLET ORAL DAILY
Qty: 90 TABLET | Refills: 3 | Status: SHIPPED | OUTPATIENT
Start: 2020-03-09 | End: 2021-03-18

## 2020-03-09 RX ORDER — METFORMIN HCL 500 MG
500 TABLET, EXTENDED RELEASE 24 HR ORAL
Qty: 180 TABLET | Refills: 3 | Status: SHIPPED | OUTPATIENT
Start: 2020-03-09 | End: 2020-06-23

## 2020-03-09 ASSESSMENT — MIFFLIN-ST. JEOR: SCORE: 1256.36

## 2020-03-09 NOTE — LETTER
[address]  [address]  [facility] [address] [phone]          March 9, 2020    RE:  Leti Grover                                                                                                                                                       [address]  [facility] [address]            To whom it may concern:    Leti Grover is under my professional care.   She was seen in clinic for an office visit today.  Please excuse her tardiness to work today.    Sincerely,        Mary Ellen Wise MD

## 2020-03-09 NOTE — PROGRESS NOTES
Subjective  Answers for HPI/ROS submitted by the patient on 3/6/2020   Chronic problems general questions HPI Form  If you checked off any problems, how difficult have these problems made it for you to do your work, take care of things at home, or get along with other people?: Somewhat difficult  PHQ9 TOTAL SCORE: 1      Leti Grover is a 49 year old female who presents to clinic today for the following health issues:    History of Present Illness        Mental Health Follow-up:  Patient presents to follow-up on Depression.Patient's depression since last visit has been:  Better  The patient is not having other symptoms associated with depression.      Any significant life events: financial concerns  Patient is not feeling anxious or having panic attacks.  Patient has no concerns about alcohol or drug use.     Social History  Tobacco Use    Smoking status: Never Smoker    Smokeless tobacco: Never Used  Alcohol use: No    Alcohol/week: 0.0 standard drinks    Frequency: Never    Drinks per session: Patient refused    Binge frequency: Never  Drug use: No      Today's PHQ-9         PHQ-9 Total Score:     (P) 1   PHQ-9 Q9 Thoughts of better off dead/self-harm past 2 weeks :   (P) Not at all   Thoughts of suicide or self harm:      Self-harm Plan:        Self-harm Action:          Safety concerns for self or others:           Diabetes:   She presents for follow up of diabetes.  She is checking home blood glucose a few times a week. She checks blood glucose before meals and at bedtime.  Blood glucose is never over 200 and never under 70. When her blood glucose is low, the patient is asymptomatic for confusion, blurred vision, lethargy and reports not feeling dizzy, shaky, or weak.  She has no concerns regarding her diabetes at this time.  She is having excessive thirst.         She eats 0-1 servings of fruits and vegetables daily.She consumes 0 sweetened beverage(s) daily.She exercises with enough effort to increase her  "heart rate 20 to 29 minutes per day.  She exercises with enough effort to increase her heart rate 3 or less days per week. She is missing 2 dose(s) of medications per week.  She is not taking prescribed medications regularly due to remembering to take.     Go over lab done recently    Fasting blood sugars have been in a good range -  recently.  Remembers to take metformin most days.    Still roller skates for exercise/stress relief.  Continues to have a relatively high carbohydrate diet - not sure why triglycerides were better on recent labs, but is happy with this.      Mood has been in a good spot overall on current medication despite increase in stressors with her sons' medical complexities and illnesses this winter.  Wishes to keep medications stable.    Here to review persistence of elevation in LDL.    Oxybutynin still providing some improvement with symptoms.      Reviewed and updated as needed this visit by Provider         Review of Systems   ROS COMP: Constitutional, HEENT, cardiovascular, pulmonary, gi and gu systems are negative, except as otherwise noted.      Objective    BP 98/72 (BP Location: Right arm, Patient Position: Sitting)   Pulse 93   Temp 99  F (37.2  C) (Oral)   Ht 1.58 m (5' 2.2\")   Wt 68.5 kg (151 lb)   LMP 08/20/2014   SpO2 96%   BMI 27.44 kg/m    Body mass index is 27.44 kg/m .  Physical Exam   GENERAL: healthy, alert and no distress  CV: regular rate and rhythm, normal S1 S2, no S3 or S4, no murmur, click or rub, no peripheral edema and peripheral pulses strong  PSYCH: mentation appears normal, affect normal/bright    Diagnostic Test Results:  Labs reviewed in Epic        Assessment & Plan       ICD-10-CM    1. Type 2 diabetes mellitus without complication, without long-term current use of insulin (H)  E11.9 Albumin Random Urine Quantitative with Creat Ratio     metFORMIN (GLUCOPHAGE-XR) 500 MG 24 hr tablet     pravastatin (PRAVACHOL) 20 MG tablet     Hemoglobin A1c     " Albumin Random Urine Quantitative with Creat Ratio     Comprehensive metabolic panel     Lipid panel reflex to direct LDL Fasting     TSH with free T4 reflex    Under good control, encouraged in lifestyle adjustments, continue metformin, add statin and recheck labs in 4 months.   2. Hyperlipidemia LDL goal <160  E78.5 pravastatin (PRAVACHOL) 20 MG tablet  Reviewed adding statin today, medication discussed   3. Moderate major depression (H)  F32.1 Well controlled, continue current medications              Patient Instructions   Keep up your roller skating and scootering!      Add pravastatin (cholesterol medicine) - take at night if you can    Keep up your metformin and watch the starches.  Try to sneak a couple salads    Come back for visit in about 4 months with labs prior (orders waiting for you)    Keep on same prozac dose for now.  Let me know about any mood problems.          Return in about 4 months (around 7/9/2020) for diabetes visit.    Mary Ellen Wise MD  [facility]

## 2020-06-22 DIAGNOSIS — E11.9 TYPE 2 DIABETES MELLITUS WITHOUT COMPLICATION, WITHOUT LONG-TERM CURRENT USE OF INSULIN (H): ICD-10-CM

## 2020-06-22 LAB — HBA1C MFR BLD: 6.6 % (ref 0–5.6)

## 2020-06-22 PROCEDURE — 84443 ASSAY THYROID STIM HORMONE: CPT | Performed by: PEDIATRICS

## 2020-06-22 PROCEDURE — 80053 COMPREHEN METABOLIC PANEL: CPT | Performed by: PEDIATRICS

## 2020-06-22 PROCEDURE — 36415 COLL VENOUS BLD VENIPUNCTURE: CPT | Performed by: PEDIATRICS

## 2020-06-22 PROCEDURE — 83036 HEMOGLOBIN GLYCOSYLATED A1C: CPT | Performed by: PEDIATRICS

## 2020-06-22 PROCEDURE — 80061 LIPID PANEL: CPT | Performed by: PEDIATRICS

## 2020-06-23 DIAGNOSIS — E11.9 TYPE 2 DIABETES MELLITUS WITHOUT COMPLICATION, WITHOUT LONG-TERM CURRENT USE OF INSULIN (H): ICD-10-CM

## 2020-06-23 LAB
ALBUMIN SERPL-MCNC: 3.9 G/DL (ref 3.4–5)
ALP SERPL-CCNC: 77 U/L (ref 40–150)
ALT SERPL W P-5'-P-CCNC: 60 U/L (ref 0–50)
ANION GAP SERPL CALCULATED.3IONS-SCNC: 6 MMOL/L (ref 3–14)
AST SERPL W P-5'-P-CCNC: 25 U/L (ref 0–45)
BILIRUB SERPL-MCNC: 0.6 MG/DL (ref 0.2–1.3)
BUN SERPL-MCNC: 12 MG/DL (ref 7–30)
CALCIUM SERPL-MCNC: 8.7 MG/DL (ref 8.5–10.1)
CHLORIDE SERPL-SCNC: 105 MMOL/L (ref 94–109)
CHOLEST SERPL-MCNC: 200 MG/DL
CO2 SERPL-SCNC: 27 MMOL/L (ref 20–32)
CREAT SERPL-MCNC: 0.78 MG/DL (ref 0.52–1.04)
GFR SERPL CREATININE-BSD FRML MDRD: 87 ML/MIN/{1.73_M2}
GLUCOSE SERPL-MCNC: 91 MG/DL (ref 70–99)
HDLC SERPL-MCNC: 36 MG/DL
LDLC SERPL CALC-MCNC: 106 MG/DL
NONHDLC SERPL-MCNC: 164 MG/DL
POTASSIUM SERPL-SCNC: 4.1 MMOL/L (ref 3.4–5.3)
PROT SERPL-MCNC: 7.6 G/DL (ref 6.8–8.8)
SODIUM SERPL-SCNC: 138 MMOL/L (ref 133–144)
TRIGL SERPL-MCNC: 291 MG/DL
TSH SERPL DL<=0.005 MIU/L-ACNC: 1.14 MU/L (ref 0.4–4)

## 2020-06-23 RX ORDER — METFORMIN HCL 500 MG
1000 TABLET, EXTENDED RELEASE 24 HR ORAL
Qty: 360 TABLET | Refills: 3 | Status: SHIPPED | OUTPATIENT
Start: 2020-06-23 | End: 2021-06-11

## 2020-07-03 ENCOUNTER — HOSPITAL ENCOUNTER (EMERGENCY)
Facility: CLINIC | Age: 50
Discharge: HOME | End: 2020-07-03
Attending: EMERGENCY MEDICINE | Admitting: EMERGENCY MEDICINE
Payer: COMMERCIAL

## 2020-07-03 VITALS
WEIGHT: 150 LBS | OXYGEN SATURATION: 96 % | TEMPERATURE: 97.1 F | HEART RATE: 70 BPM | RESPIRATION RATE: 16 BRPM | BODY MASS INDEX: 27.26 KG/M2 | DIASTOLIC BLOOD PRESSURE: 88 MMHG | SYSTOLIC BLOOD PRESSURE: 151 MMHG

## 2020-07-03 DIAGNOSIS — J06.9 ACUTE URI: ICD-10-CM

## 2020-07-03 PROCEDURE — C9803 HOPD COVID-19 SPEC COLLECT: HCPCS

## 2020-07-03 PROCEDURE — U0003 INFECTIOUS AGENT DETECTION BY NUCLEIC ACID (DNA OR RNA); SEVERE ACUTE RESPIRATORY SYNDROME CORONAVIRUS 2 (SARS-COV-2) (CORONAVIRUS DISEASE [COVID-19]), AMPLIFIED PROBE TECHNIQUE, MAKING USE OF HIGH THROUGHPUT TECHNOLOGIES AS DESCRIBED BY CMS-2020-01-R: HCPCS | Performed by: EMERGENCY MEDICINE

## 2020-07-03 PROCEDURE — 99283 EMERGENCY DEPT VISIT LOW MDM: CPT

## 2020-07-03 NOTE — ED AVS SNAPSHOT
[facility] Emergency Department  [address]  [facility] [address]  Phone:  [phone]  Fax:  [phone]                                    Leti Grover   MRN: [id number]    Department:  [facility] Emergency Department   Date of Visit:  7/3/2020           After Visit Summary Signature Page    I have received my discharge instructions, and my questions have been answered. I have discussed any challenges I see with this plan with the nurse or doctor.    ..........................................................................................................................................  Patient/Patient Representative Signature      ..........................................................................................................................................  Patient Representative Print Name and Relationship to Patient    ..................................................               ................................................  Date                                   Time    ..........................................................................................................................................  Reviewed by Signature/Title    ...................................................              ..............................................  Date                                               Time          22EPIC Rev 08/18

## 2020-07-04 LAB
SARS-COV-2 RNA SPEC QL NAA+PROBE: NOT DETECTED
SPECIMEN SOURCE: NORMAL

## 2020-07-04 ASSESSMENT — ENCOUNTER SYMPTOMS
DIARRHEA: 0
SHORTNESS OF BREATH: 0
MYALGIAS: 0
FEVER: 0
COUGH: 1
HEADACHES: 0

## 2020-07-04 NOTE — ED PROVIDER NOTES
History     Chief Complaint:  Cough and request covid test    HPI   Leti Grover is a 49 year old female with a recent history of exposure to a coworker testing positive for COVID-19 who presents with a cough. The patient reports she has a new onset mild dry cough starting today. She denies fever, myalgias, diarrhea, headache, shortness of breath, and chest pain.     Allergies:  No known drug allergies    Medications:    Glucophage  Pravachol  Prozac  Claritin    Past Medical History:    Anxiety  Depression  Gestational diabetes  History of blood transfusion  Hyperlipidemia  Obstructive sleep apnea  Type II diabetes mellitus    Past Surgical History:    Pterygium with conjunctival autologous transplant (L)    Family History:    Skin cancer (Father)    Social History:  Smoking status: Never  Alcohol use: No  Drug use: No  PCP: Mary Ellen Wise  Marital Status:   [2]    Review of Systems   Constitutional: Negative for fever.   Respiratory: Positive for cough. Negative for shortness of breath.    Cardiovascular: Negative for chest pain.   Gastrointestinal: Negative for diarrhea.   Musculoskeletal: Negative for myalgias.   Neurological: Negative for headaches.   All other systems reviewed and are negative.        Physical Exam     Patient Vitals for the past 24 hrs:   BP Temp Temp src Pulse Heart Rate Resp SpO2 Weight   07/03/20 2324 (!) 151/88 97.1  F (36.2  C) Temporal 70 70 16 96 % 68 kg (150 lb)       Physical Exam  Constitutional:  Oriented to person, place, and time. Well-appearing. Non-toxic.  HENT:    No oral erythema.   Head:    Normocephalic.   Mouth/Throat:   Oropharynx is clear and moist.   Eyes:    EOM are normal. Pupils are equal, round, and reactive to light.   Neck:    Neck supple.   Cardiovascular:  Normal rate, regular rhythm and normal heart sounds.      Exam reveals no gallop and no friction rub.       No murmur heard.  Pulmonary/Chest:  Effort normal and breath sounds normal.      No  respiratory distress. No wheezes. No rales.      No reproducible chest wall pain.  Abdominal:   Soft. No distension. No tenderness. No rebound and no guarding.   Musculoskeletal:  Normal range of motion.   Neurological:   Alert and oriented to person, place, and time.           Moves all 4 extremities spontaneously    Skin:    No rash noted. No pallor.     Emergency Department Course   Laboratory:  Symptomatic COVID-19 Virus PCR: Pending    Procedures:    Emergency Department Course:  Past medical records, nursing notes, and vitals reviewed.  2345: I performed an exam of the patient and obtained history, as documented above.  The patient's nose and throat were swabbed and this sample was sent for COVID testing, findings above.     Findings and plan explained to the Patient. Patient discharged home with instructions regarding supportive care, medications, and reasons to return. The importance of close follow-up was reviewed.     Impression & Plan    Medical Decision Making:  Leti Grover is a 49 year old female who presents for evaluation of a viral syndrome with associated cough.  This is consistent with an influenza-like or coronavirus illness.  Given the public health emergency and SARS-CoV pandemic, they are at risk for pneumonia but no signs of this are detected on today's visit. Given the overall appearance of the patient, Chest x-ray is not indicated at this time. Given they were exposed to a known person who was positive for COVID-19, I will send a test.  Return to the ED is indicated for high fevers and/or worsening life-threatening symptoms such as increasing productive cough, shortness of breath, or confusion.  I have otherwise recommended they stay home, practice good personal health hygiene, and try to limit interactions with others (self quarantine if possible for 7 days) or until improvement of their symptoms and 3 days have passed since they have been fever-free. There are no other signs of  serious bacterial infection. I considered a large differential such as bacteremia, meningitis, UTI/pyelonephritis, or strep pharyngitis, to name a few. Strict return and follow up instructions were given and patient understands.    Diagnosis:    ICD-10-CM    1. Acute URI  J06.9        Disposition:  discharged to home    Bret Raymond  7/3/2020   [facility] EMERGENCY DEPARTMENT  I, Bret Raymond, am serving as a scribe at 11:45 PM on 7/3/2020 to document services personally performed by Sanjay Rose MD based on my observations and the provider's statements to me.        Sanjay Rose MD  07/04/20 5934

## 2020-07-04 NOTE — ED TRIAGE NOTES
Pt reports she has been clearing her throat a lot and having a slight cough, requesting COVID test

## 2020-07-28 ENCOUNTER — E-VISIT (OUTPATIENT)
Dept: PEDIATRICS | Facility: CLINIC | Age: 50
End: 2020-07-28
Payer: COMMERCIAL

## 2020-07-28 DIAGNOSIS — Z20.822 EXPOSURE TO COVID-19 VIRUS: Primary | ICD-10-CM

## 2020-07-28 PROCEDURE — 99421 OL DIG E/M SVC 5-10 MIN: CPT | Performed by: PEDIATRICS

## 2020-07-28 NOTE — TELEPHONE ENCOUNTER
Provider E-Visit time total (minutes): 5      Mary Ellen Wise MD  Internal Medicine - Pediatrics

## 2020-07-28 NOTE — PATIENT INSTRUCTIONS
Instructions for Patients  Please follow these steps:    1. We will call to schedule your test.  2. A member of our care team will ask you some questions. Then, they will use a swab to collect samples from your nose and throat.     Our testing team will send you your test results.    How can I protect others?    Stay home and away from others (self-isolate) until:    You ve had no fever--and no medicine that reduces fever--for 3 full days (72 hours). And      Your other symptoms have resolved (gotten better). For example, your cough or breathing has improved. And     At least 10 days have passed since your symptoms started.    Stay at least 6 feet away from others. (If someone will drive you to your test, stay in the backseat, as far away from the  as you can.)     Don t go to work, school or anywhere else. When it s time for your test, go straight to the testing site. Don t make any stops on the way there or back.     Wash your hands and face often. Use soap and water.     Cover your mouth and nose with a mask, tissue or washcloth.     Don t touch anyone. No hugging, kissing or handshakes.    How can I take care of myself?    1. Get lots of rest. Drink extra fluids (unless a doctor has told you not to).     2. Take Tylenol (acetaminophen) for fever or pain. If you have liver or kidney problems, ask your family doctor if it's okay to take Tylenol.     Adults can take either:     650 mg (two 325 mg pills) every 4 to 6 hours, or     1,000 mg (two 500 mg pills) every 8 hours as needed.     Note: Don't take more than 3,000 mg in one day.   Acetaminophen is found in many medicines (both prescribed and over-the-counter medicines). Read all labels to be sure you don't take too much.   For children, check the Tylenol bottle for the right dose. The dose is based on  the child's age or weight.    3. If you have other health problems (like cancer, heart failure, an organ transplant or severe kidney disease): Call your  specialty clinic if you don't feel better in the next 2 days.    4. Know when to call 911: If your breathing is so bad that it keeps you from doing normal activities, call 911 or go to the emergency room. Tell them that you've been staying home and may have COVID-19.      Thank you for limiting contact with others, wearing a simple mask to cover your cough, practice good hand hygiene habits and accessing our virtual services where possible to limit the spread of this virus.    For more information about COVID19 and options for caring for yourself at home, please visit the CDC website at https://www.cdc.gov/coronavirus/2019-ncov/about/steps-when-sick.html  For more options for care at [facility], please visit our website at [email or web address]

## 2020-07-30 DIAGNOSIS — Z20.822 EXPOSURE TO COVID-19 VIRUS: ICD-10-CM

## 2020-07-31 LAB
LABORATORY COMMENT REPORT: NORMAL
SARS-COV-2 RNA SPEC QL NAA+PROBE: NEGATIVE
SARS-COV-2 RNA SPEC QL NAA+PROBE: NORMAL
SPECIMEN SOURCE: NORMAL
SPECIMEN SOURCE: NORMAL

## 2020-08-05 DIAGNOSIS — R73.03 PREDIABETES: ICD-10-CM

## 2020-08-05 RX ORDER — LANCETS
EACH MISCELLANEOUS
OUTPATIENT
Start: 2020-08-05

## 2020-08-25 DIAGNOSIS — F32.1 MODERATE MAJOR DEPRESSION (H): ICD-10-CM

## 2020-09-20 DIAGNOSIS — R73.03 PREDIABETES: ICD-10-CM

## 2020-09-21 RX ORDER — BLOOD SUGAR DIAGNOSTIC
STRIP MISCELLANEOUS
Qty: 100 STRIP | Refills: 0 | Status: SHIPPED | OUTPATIENT
Start: 2020-09-21 | End: 2021-02-05

## 2020-10-11 DIAGNOSIS — R09.82 POST-NASAL DRIP: ICD-10-CM

## 2020-10-11 DIAGNOSIS — N39.46 MIXED INCONTINENCE URGE AND STRESS (MALE)(FEMALE): ICD-10-CM

## 2020-10-12 RX ORDER — OXYBUTYNIN CHLORIDE 10 MG/1
TABLET, EXTENDED RELEASE ORAL
Qty: 90 TABLET | Refills: 0 | Status: SHIPPED | OUTPATIENT
Start: 2020-10-12 | End: 2020-10-27

## 2020-10-12 RX ORDER — LORATADINE 10 MG/1
TABLET ORAL
Qty: 90 TABLET | Refills: 0 | Status: SHIPPED | OUTPATIENT
Start: 2020-10-12 | End: 2021-01-06

## 2020-10-13 ENCOUNTER — HOSPITAL ENCOUNTER (EMERGENCY)
Facility: CLINIC | Age: 50
Discharge: HOME | End: 2020-10-13
Attending: EMERGENCY MEDICINE | Admitting: EMERGENCY MEDICINE
Payer: COMMERCIAL

## 2020-10-13 ENCOUNTER — APPOINTMENT (OUTPATIENT)
Dept: GENERAL RADIOLOGY | Facility: CLINIC | Age: 50
End: 2020-10-13
Attending: EMERGENCY MEDICINE
Payer: COMMERCIAL

## 2020-10-13 VITALS
RESPIRATION RATE: 16 BRPM | HEART RATE: 71 BPM | OXYGEN SATURATION: 100 % | SYSTOLIC BLOOD PRESSURE: 140 MMHG | DIASTOLIC BLOOD PRESSURE: 91 MMHG | TEMPERATURE: 98 F

## 2020-10-13 DIAGNOSIS — S62.112A CLOSED CHIP FRACTURE OF TRIQUETRUM OF LEFT WRIST, INITIAL ENCOUNTER: ICD-10-CM

## 2020-10-13 DIAGNOSIS — S63.502A WRIST SPRAIN, LEFT, INITIAL ENCOUNTER: ICD-10-CM

## 2020-10-13 PROCEDURE — 99284 EMERGENCY DEPT VISIT MOD MDM: CPT

## 2020-10-13 PROCEDURE — 73110 X-RAY EXAM OF WRIST: CPT | Mod: LT

## 2020-10-13 PROCEDURE — 73130 X-RAY EXAM OF HAND: CPT | Mod: LT

## 2020-10-13 ASSESSMENT — ENCOUNTER SYMPTOMS
ARTHRALGIAS: 1
NUMBNESS: 0
NECK PAIN: 0
COLOR CHANGE: 0
HEADACHES: 0

## 2020-10-13 NOTE — ED AVS SNAPSHOT
[facility] Emergency Dept  [address]  [facility] [address]  Phone: [phone]  Fax: [phone]                                    Leti Grover   MRN: [id number]    Department: [facility] Emergency Dept   Date of Visit: 10/13/2020           After Visit Summary Signature Page    I have received my discharge instructions, and my questions have been answered. I have discussed any challenges I see with this plan with the nurse or doctor.    ..........................................................................................................................................  Patient/Patient Representative Signature      ..........................................................................................................................................  Patient Representative Print Name and Relationship to Patient    ..................................................               ................................................  Date                                   Time    ..........................................................................................................................................  Reviewed by Signature/Title    ...................................................              ..............................................  Date                                               Time          22EPIC Rev 08/18

## 2020-10-13 NOTE — ED TRIAGE NOTES
Fall onto her back last night while roller skating, somehow sustain injury to left hand/forearm. Not wearing helmet at that time, did bump her head, denies any LOC, was able to get back up after fall. Sensation intact, hurts to move hand/fingers. ABCs intact.

## 2020-10-13 NOTE — DISCHARGE INSTRUCTIONS
You have an avulsion (or chip) fracture of the triquetrium bone in your wrist/hand. Follow-up with orthopedics hand and forearm specialists for further care.

## 2020-10-13 NOTE — ED PROVIDER NOTES
History     Chief Complaint:  Fall      HPI  Leti Grover is a 50 year old left handed female who presents to the emergency department for evaluation of left wrist and hand pain after falling while roller skating last night.  She notes she fell backwards.  She thinks she may have hit her head lightly but denies headache, neck pain or loss of consciousness.  She notes her only concern is ongoing left wrist and hand pain.  She is not sure how she fell on her left wrist but notes she had pain immediately afterward.  She notes she tried to ice it and see if it got better but it did not so she presents here.  She denies numbness, tingling or color change.  She denies any pain in her elbow or shoulder.  She denies any other trauma or pain on today's evaluation.       Allergies:  No known drug allergies    Medications:    Prozac  Loratadine  Metformin  Oxybutynin  Pravastatin    Past Medical History:    Anxiety  Depression  HLD  DM type 2  Gestational diabetes  Chronic rhinitis  Obesity  YAKELIN    Past Surgical History:    Biopsy  Pterygium with conjunctival autologous transplant    Social History:  Smoking Status: Never Smoker  Alcohol Use: No  Drug Use: No  PCP: Mary Ellen Wise   The patient presents to the emergency department by herself.  Marital Status:     Review of Systems   Musculoskeletal: Positive for arthralgias. Negative for neck pain.   Skin: Negative for color change.   Neurological: Negative for syncope, numbness and headaches.   All other systems reviewed and are negative.    Physical Exam     Patient Vitals for the past 24 hrs:   BP Temp Temp src Pulse Resp SpO2   10/13/20 0703 (!) 114/106 98  F (36.7  C) Oral 71 18 100 %         Physical Exam  General: Adult female sitting upright  CV: Left radial pulse palpable.  MSK: Edema of the dorsal left wrist, more prominent in the ulnar region and spreading into the dorsal hand.  Tender over the ulnar wrist into the ulnar aspect of the left hand  with no palpable deformity.  No crepitus.has minimal range of motion of the left wrist, limited by pain.  Able to move all digits of the left hand.    Skin: Warm and dry. No rashes or lesions or ecchymoses on visible skin.  Neuro: Alert and oriented. Responds appropriately to all questions and commands. No focal findings appreciated.  Sensation intact to light touch over all digits of the left hand.  Normal muscle tone.  Psych: Normal mood and affect. Pleasant.      Emergency Department Course   Imaging:  Radiographic findings were communicated with the patient who voiced understanding of the findings.  XR Wrist, Left, G/E 3 views:   IMPRESSION: A avulsion fracture at the dorsal aspect of the triquetrum   with displacement. as per radiology.    XR Hand, Left, G/E, 3 views:   IMPRESSION: A avulsion fracture at the dorsal aspect of the triquetrum   with displacement. as per radiology.    Emergency Department Course:  Nursing notes and vitals reviewed. (994) I performed an exam of the patient as documented above. She declines pain medications or ice.    The patient was sent for a xray while in the emergency department, findings above.     900 I rechecked the patient and discussed the results of her workup thus far. She is placed in a preformed velcro wrist splint by a technician.    Findings and plan explained to the Patient. Patient discharged home with instructions regarding supportive care, medications, and reasons to return. The importance of close follow-up was reviewed.       Impression & Plan    Medical Decision Making:  Leti Grover is a 50 year old female who presents for evaluation of wrist pain after fall. CMS is intact distally in the extremity.  Xrays reveal a triquetral avulsion fracture and clinically she likely has a wrist sprain.  The patient/family understand that this may change with time and orthopedic follow-up is indicated.  There is no indication for ortho consultation from the ED. Rather,  close follow-up is indicated in the next days.  Splint and fracture precautions for home.  The patient has no headache or neck pain ports possibly hitting her head, there is no clinical indication for advanced imaging.  She feels comfortable to plan as stated which includes calling [facility] orthopedics hand/forearm specialist and set up a follow-up appointment.  As needed return discussed.    Diagnosis:    ICD-10-CM    1. Wrist sprain, left, initial encounter  S63.502A    2. Closed chip fracture of triquetrum of left wrist, initial encounter  S62.112A        Disposition:  Discharged to home    Fritz Perez  10/13/2020   EMERGENCY DEPARTMENT  Scribe Disclosure:  I, Fritz Perez, am serving as a scribe at 7:54 AM on 10/13/2020 to document services personally performed by Camryn Cui MD based on my observations and the provider's statements to me.          Camryn uCi MD  10/13/20 0915

## 2020-10-19 ENCOUNTER — TRANSFERRED RECORDS (OUTPATIENT)
Dept: HEALTH INFORMATION MANAGEMENT | Facility: CLINIC | Age: 50
End: 2020-10-19

## 2020-10-27 ENCOUNTER — OFFICE VISIT (OUTPATIENT)
Dept: PEDIATRICS | Facility: CLINIC | Age: 50
End: 2020-10-27
Payer: COMMERCIAL

## 2020-10-27 VITALS
OXYGEN SATURATION: 97 % | HEART RATE: 72 BPM | HEIGHT: 62 IN | DIASTOLIC BLOOD PRESSURE: 80 MMHG | SYSTOLIC BLOOD PRESSURE: 107 MMHG | BODY MASS INDEX: 27.29 KG/M2 | TEMPERATURE: 98.2 F | WEIGHT: 148.3 LBS

## 2020-10-27 DIAGNOSIS — N39.46 MIXED INCONTINENCE URGE AND STRESS (MALE)(FEMALE): ICD-10-CM

## 2020-10-27 DIAGNOSIS — Z12.11 COLON CANCER SCREENING: ICD-10-CM

## 2020-10-27 DIAGNOSIS — L30.9 CHRONIC DERMATITIS: ICD-10-CM

## 2020-10-27 DIAGNOSIS — G47.33 OSA (OBSTRUCTIVE SLEEP APNEA): ICD-10-CM

## 2020-10-27 DIAGNOSIS — E78.5 HYPERLIPIDEMIA, UNSPECIFIED HYPERLIPIDEMIA TYPE: ICD-10-CM

## 2020-10-27 DIAGNOSIS — E11.9 TYPE 2 DIABETES MELLITUS WITHOUT COMPLICATION, WITHOUT LONG-TERM CURRENT USE OF INSULIN (H): ICD-10-CM

## 2020-10-27 DIAGNOSIS — Z12.31 VISIT FOR SCREENING MAMMOGRAM: ICD-10-CM

## 2020-10-27 DIAGNOSIS — Z00.00 ROUTINE GENERAL MEDICAL EXAMINATION AT A HEALTH CARE FACILITY: Primary | ICD-10-CM

## 2020-10-27 DIAGNOSIS — F32.1 MODERATE MAJOR DEPRESSION (H): ICD-10-CM

## 2020-10-27 LAB — HBA1C MFR BLD: 6.4 % (ref 0–5.6)

## 2020-10-27 PROCEDURE — 36415 COLL VENOUS BLD VENIPUNCTURE: CPT | Performed by: PEDIATRICS

## 2020-10-27 PROCEDURE — 90682 RIV4 VACC RECOMBINANT DNA IM: CPT | Performed by: PEDIATRICS

## 2020-10-27 PROCEDURE — 90471 IMMUNIZATION ADMIN: CPT | Performed by: PEDIATRICS

## 2020-10-27 PROCEDURE — 82043 UR ALBUMIN QUANTITATIVE: CPT | Performed by: PEDIATRICS

## 2020-10-27 PROCEDURE — 83036 HEMOGLOBIN GLYCOSYLATED A1C: CPT | Performed by: PEDIATRICS

## 2020-10-27 PROCEDURE — 99396 PREV VISIT EST AGE 40-64: CPT | Mod: 25 | Performed by: PEDIATRICS

## 2020-10-27 RX ORDER — OXYBUTYNIN CHLORIDE 10 MG/1
10 TABLET, EXTENDED RELEASE ORAL DAILY
Qty: 90 TABLET | Refills: 3 | Status: SHIPPED | OUTPATIENT
Start: 2020-10-27

## 2020-10-27 RX ORDER — FLUOCINONIDE 0.5 MG/G
OINTMENT TOPICAL
Qty: 30 G | Refills: 2 | Status: SHIPPED | OUTPATIENT
Start: 2020-10-27

## 2020-10-27 ASSESSMENT — MIFFLIN-ST. JEOR: SCORE: 1239.18

## 2020-10-27 NOTE — PROGRESS NOTES
"   SUBJECTIVE:   CC: Leti Grover is an 50 year old woman who presents for preventive health visit.     {Split Bill scripting  The purpose of this visit is to discuss your medical history and prevent health problems before you are sick. You may be responsible for a co-pay, coinsurance, or deductible if your visit today includes services such as checking on a sore throat, having an x-ray or lab test, or treating and evaluating a new or existing condition :496969}  Patient has been advised of split billing requirements and indicates understanding: {Yes and No:519613}  Healthy Habits:    Do you get at least three servings of calcium containing foods daily (dairy, green leafy vegetables, etc.)? { :626659::\"yes\"}    Amount of exercise or daily activities, outside of work: { :229175}    Problems taking medications regularly { :866888::\"No\"}    Medication side effects: { :440878::\"No\"}    Have you had an eye exam in the past two years? { :763760}    Do you see a dentist twice per year? { :352510}    Do you have sleep apnea, excessive snoring or daytime drowsiness?{ :864565}  {Outside tests to abstract? :519046}    {additional problems to add (Optional):458193}    Today's PHQ-2 Score:   PHQ-2 ( 1999 Pfizer) 10/26/2020 3/6/2020   Q1: Little interest or pleasure in doing things 1 -   Q2: Feeling down, depressed or hopeless 0 -   PHQ-2 Score 1 -   Q1: Little interest or pleasure in doing things Several days -   Q2: Feeling down, depressed or hopeless Not at all -   PHQ-2 Score 1 Incomplete     {PHQ-2 LOOK IN ASSESSMENTS (Optional) :272400}  Abuse: Current or Past(Physical, Sexual or Emotional)- {YES/NO/NA:186037}  Do you feel safe in your environment? {YES/NO/NA:634561}        Social History     Tobacco Use     Smoking status: Never Smoker     Smokeless tobacco: Never Used   Substance Use Topics     Alcohol use: No     Alcohol/week: 0.0 standard drinks     Frequency: Never     Drinks per session: Patient refused     Binge " "frequency: Never     If you drink alcohol do you typically have >3 drinks per day or >7 drinks per week? {ETOH :203602}                     Reviewed orders with patient.  Reviewed health maintenance and updated orders accordingly - {Yes/No:241672::\"Yes\"}  {Chronicprobdata (Optional):580190}    {Mammo Decision Support (Optional):348472}    Pertinent mammograms are reviewed under the imaging tab.  History of abnormal Pap smear: {PAP HX:916347}  PAP / HPV Latest Ref Rng & Units 7/8/2019 12/7/2017 9/15/2014   PAP - NIL ASC-H(A) NIL   HPV 16 DNA NEG:Negative Negative Negative -   HPV 18 DNA NEG:Negative Negative Negative -   OTHER HR HPV NEG:Negative Negative Negative -     Reviewed and updated as needed this visit by clinical staff                 Reviewed and updated as needed this visit by Provider                {HISTORY OPTIONS (Optional):355441}    ROS:  { :499502}    OBJECTIVE:   LMP 08/20/2014   EXAM:  {Exam Choices:261988}    {Diagnostic Test Results (Optional):203481::\"Diagnostic Test Results:\",\"Labs reviewed in Epic\"}    ASSESSMENT/PLAN:   {Diag Picklist:785829}    Patient has been advised of split billing requirements and indicates understanding: {YES / NO:654972::\"Yes\"}  COUNSELING:   {FEMALE COUNSELING MESSAGES:990927::\"Reviewed preventive health counseling, as reflected in patient instructions\"}    Estimated body mass index is 27.26 kg/m  as calculated from the following:    Height as of 3/9/20: 1.58 m (5' 2.2\").    Weight as of 7/3/20: 68 kg (150 lb).    {Weight Management Plan (ACO) Complete if BMI is abnormal-  Ages 18-64  BMI >24.9.  Age 65+ with BMI <23 or >30 (Optional):887344}    She reports that she has never smoked. She has never used smokeless tobacco.      Counseling Resources:  ATP IV Guidelines  Pooled Cohorts Equation Calculator  Breast Cancer Risk Calculator  BRCA-Related Cancer Risk Assessment: FHS-7 Tool  FRAX Risk Assessment  ICSI Preventive Guidelines  Dietary Guidelines for Americans, " 2010  USDA's MyPlate  ASA Prophylaxis  Lung CA Screening    Mary Ellen Wise MD  [facility]

## 2020-10-27 NOTE — PROGRESS NOTES
SUBJECTIVE:   CC: Leti Grover is an 50 year old woman who presents for preventive health visit.       Patient has been advised of split billing requirements and indicates understanding: Yes  Healthy Habits:     Getting at least 3 servings of Calcium per day:  Yes    Bi-annual eye exam:  Yes    Dental care twice a year:  Yes    Sleep apnea or symptoms of sleep apnea:  Sleep apnea    Diet:  Regular (no restrictions)    Frequency of exercise:  1 day/week    Duration of exercise:  15-30 minutes    Taking medications regularly:  Yes    Barriers to taking medications:  Other    Medication side effects:  None    PHQ-2 Total Score: 1    Additional concerns today:  No  History of Present Illness       Diabetes:   She presents for follow up of diabetes.  She is checking home blood glucose one time daily. She checks blood glucose before meals.  Blood glucose is never over 200 and never under 70. When her blood glucose is low, the patient is asymptomatic for confusion, blurred vision, lethargy and reports not feeling dizzy, shaky, or weak.  She has no concerns regarding her diabetes at this time.  She is not experiencing numbness or burning in feet, excessive thirst, blurry vision, weight changes or redness, sores or blisters on feet.         She eats 0-1 servings of fruits and vegetables daily.She consumes 0 sweetened beverage(s) daily.She exercises with enough effort to increase her heart rate 30 to 60 minutes per day.  She exercises with enough effort to increase her heart rate 3 or less days per week.   She is not taking prescribed medications regularly due to other.    Ability to successfully perform activities of daily living:   Home safety:     Hearing impairment: Yes,         Today's PHQ-2 Score:   PHQ-2 ( 1999 Pfizer) 10/26/2020   Q1: Little interest or pleasure in doing things 1   Q2: Feeling down, depressed or hopeless 0   PHQ-2 Score 1   Q1: Little interest or pleasure in doing things Several days   Q2:  Feeling down, depressed or hopeless Not at all   PHQ-2 Score 1       Abuse: Current or Past (Physical, Sexual or Emotional) - No  Do you feel safe in your environment? Yes        Social History     Tobacco Use     Smoking status: Never Smoker     Smokeless tobacco: Never Used   Substance Use Topics     Alcohol use: No     Alcohol/week: 0.0 standard drinks     Frequency: Never     Drinks per session: Patient refused     Binge frequency: Never     If you drink alcohol do you typically have >3 drinks per day or >7 drinks per week? No    Alcohol Use 10/27/2020   Prescreen: >3 drinks/day or >7 drinks/week? -   Prescreen: >3 drinks/day or >7 drinks/week? No   Reviewed orders with patient.  Reviewed health maintenance and updated orders accordingly - Yes  Lab work is in process    Mammogram Screening: Patient under age 50, mutual decision reflected in health maintenance.      Pertinent mammograms are reviewed under the imaging tab.  History of abnormal Pap smear: YES - updated in Problem List and Health Maintenance accordingly  PAP / HPV Latest Ref Rng & Units 7/8/2019 12/7/2017 9/15/2014   PAP - NIL ASC-H(A) NIL   HPV 16 DNA NEG:Negative Negative Negative -   HPV 18 DNA NEG:Negative Negative Negative -   OTHER HR HPV NEG:Negative Negative Negative -     Reviewed and updated as needed this visit by clinical staff  Tobacco                Reviewed and updated as needed this visit by Provider                    Type 2 diabetes - recent fasting blood sugars in the low 100's.   Control has been good.    Has scaling skin posterior to ear - uses steroid on this with good success - needs refill    Incontinence - well controlled on current oxybutynin prescription    Depression - doing well on current medication    YAKELIN - uses CPAP, needs new equipment orders placed.    Left wrist fracture from roller skating - cast in place - comes off in 4 weeks    HL - on statin, tolerating well        Review of Systems   ROS: 10 point ROS neg  "other than the symptoms noted above in the HPI.       OBJECTIVE:   /80   Pulse 72   Temp 98.2  F (36.8  C) (Temporal)   Ht 1.58 m (5' 2.21\")   Wt 67.3 kg (148 lb 4.8 oz)   LMP 08/20/2014   SpO2 97%   BMI 26.95 kg/m     Wt Readings from Last 4 Encounters:   10/27/20 67.3 kg (148 lb 4.8 oz)   07/03/20 68 kg (150 lb)   03/09/20 68.5 kg (151 lb)   10/01/19 67.8 kg (149 lb 8 oz)       Physical Exam  GENERAL: healthy, alert and no distress  EYES: Eyes grossly normal to inspection, PERRL and conjunctivae and sclerae normal  HENT: ear canals and TM's normal, nose and mouth without ulcers or lesions  NECK: no adenopathy, no asymmetry, masses, or scars and thyroid normal to palpation  RESP: lungs clear to auscultation - no rales, rhonchi or wheezes  CV: regular rate and rhythm, normal S1 S2, no S3 or S4, no murmur, click or rub, no peripheral edema and peripheral pulses strong  ABDOMEN: soft, nontender, no hepatosplenomegaly, no masses and bowel sounds normal  MS: no gross musculoskeletal defects noted, no edema  SKIN: dry, scaling skin posterior to ears bilaterally  NEURO: Normal strength and tone, mentation intact and speech normal  PSYCH: mentation appears normal, affect normal/bright    Diagnostic Test Results:  pending    ASSESSMENT/PLAN:       ICD-10-CM    1. Routine general medical examination at a health care facility  Z00.00 Declined repeat pap today as she had her son with her - plan to do at next visit     2. Type 2 diabetes mellitus without complication, without long-term current use of insulin (H)  E11.9 Well controlled, continue current medications    Foot exam at next visit       Albumin Random Urine Quantitative with Creat Ratio     Hemoglobin A1c   3. Moderate major depression (H)  F32.1 FLUoxetine (PROZAC) 20 MG capsule  Well controlled, continue current medications     4. Hyperlipidemia, unspecified hyperlipidemia type  E78.5 Continue statin   5. YAKELIN (obstructive sleep apnea)  G47.33 CPAP Order " "for DME - ONLY FOR DME   6. Chronic dermatitis  L30.9 fluocinonide (LIDEX) 0.05 % external ointment   7. Mixed incontinence urge and stress (male)(female)  N39.46 oxybutynin ER (DITROPAN-XL) 10 MG 24 hr tablet  Well controlled, continue current medications     8. Visit for screening mammogram  Z12.31 MA SCREENING DIGITAL BILAT - Future  (s+30)   9. Colon cancer screening  Z12.11 Fecal colorectal cancer screen (FIT)       Patient has been advised of split billing requirements and indicates understanding: No  COUNSELING:  Reviewed preventive health counseling, as reflected in patient instructions    Estimated body mass index is 26.95 kg/m  as calculated from the following:    Height as of this encounter: 1.58 m (5' 2.21\").    Weight as of this encounter: 67.3 kg (148 lb 4.8 oz).    Weight management plan: Discussed healthy diet and exercise guidelines    She reports that she has never smoked. She has never used smokeless tobacco.      Counseling Resources:  ATP IV Guidelines  Pooled Cohorts Equation Calculator  Breast Cancer Risk Calculator  BRCA-Related Cancer Risk Assessment: FHS-7 Tool  FRAX Risk Assessment  ICSI Preventive Guidelines  Dietary Guidelines for Americans, 2010  USDA's MyPlate  ASA Prophylaxis  Lung CA Screening    Mary Ellen Wise MD  [facility]  "

## 2020-10-30 LAB
CREAT UR-MCNC: 148 MG/DL
MICROALBUMIN UR-MCNC: 18 MG/L
MICROALBUMIN/CREAT UR: 11.82 MG/G CR (ref 0–25)

## 2020-11-10 ENCOUNTER — TRANSFERRED RECORDS (OUTPATIENT)
Dept: HEALTH INFORMATION MANAGEMENT | Facility: CLINIC | Age: 50
End: 2020-11-10

## 2020-11-23 DIAGNOSIS — Z12.11 COLON CANCER SCREENING: ICD-10-CM

## 2020-11-23 PROCEDURE — 82274 ASSAY TEST FOR BLOOD FECAL: CPT | Performed by: PEDIATRICS

## 2020-11-25 LAB — HEMOCCULT STL QL IA: NEGATIVE

## 2020-12-07 ENCOUNTER — HOSPITAL ENCOUNTER (OUTPATIENT)
Dept: MAMMOGRAPHY | Facility: CLINIC | Age: 50
Discharge: HOME | End: 2020-12-07
Attending: PEDIATRICS | Admitting: PEDIATRICS
Payer: COMMERCIAL

## 2020-12-07 DIAGNOSIS — Z12.31 VISIT FOR SCREENING MAMMOGRAM: ICD-10-CM

## 2020-12-07 PROCEDURE — 77063 BREAST TOMOSYNTHESIS BI: CPT

## 2020-12-11 ENCOUNTER — PATIENT OUTREACH (OUTPATIENT)
Dept: PEDIATRICS | Facility: CLINIC | Age: 50
End: 2020-12-11

## 2020-12-11 DIAGNOSIS — R87.611 PAPANICOLAOU SMEAR OF CERVIX WITH ATYPICAL SQUAMOUS CELLS CANNOT EXCLUDE HIGH GRADE SQUAMOUS INTRAEPITHELIAL LESION (ASC-H): ICD-10-CM

## 2020-12-11 NOTE — TELEPHONE ENCOUNTER
8/12/09 pap = ASC-H  10/22/09 COLP = RADHA 1  5/13/10 pap = ASCUS, HPV neg  6/24/10 COLP = RADHA 1  12/2/10 pap = ASCUS, HPV neg  6/3/11 pap = Neg pap and neg hpv screening---r/p pap in 1 yr or at postpartum visit (EDC -12/23/11)  05/10/13 MyChart reminder letter sent. Patient plans to repeat pap in July 2013  09/15/14 Pap= NIL, Neg HPV. 3 yr co-testing  12/7/17 ASC-H pap, neg HR HPV. Plan colp due by 3/7/18  2/8/18 [place]- Normal, Plan 1 yr co-test  7/8/19 NIL pap, Neg HPV.  Plan: cotest in 1 year  11/11/20 Reminder Mychart- marked as read  12/14/20 visit

## 2020-12-14 ENCOUNTER — OFFICE VISIT (OUTPATIENT)
Dept: PEDIATRICS | Facility: CLINIC | Age: 50
End: 2020-12-14
Payer: COMMERCIAL

## 2020-12-14 VITALS
BODY MASS INDEX: 26.89 KG/M2 | RESPIRATION RATE: 18 BRPM | WEIGHT: 148 LBS | OXYGEN SATURATION: 99 % | SYSTOLIC BLOOD PRESSURE: 98 MMHG | DIASTOLIC BLOOD PRESSURE: 72 MMHG | HEART RATE: 80 BPM

## 2020-12-14 DIAGNOSIS — Z12.4 CERVICAL CANCER SCREENING: ICD-10-CM

## 2020-12-14 DIAGNOSIS — Z01.419 ENCOUNTER FOR GYNECOLOGICAL EXAMINATION WITHOUT ABNORMAL FINDING: Primary | ICD-10-CM

## 2020-12-14 PROCEDURE — G0145 SCR C/V CYTO,THINLAYER,RESCR: HCPCS | Performed by: FAMILY MEDICINE

## 2020-12-14 PROCEDURE — 87624 HPV HI-RISK TYP POOLED RSLT: CPT | Performed by: FAMILY MEDICINE

## 2020-12-14 PROCEDURE — 99213 OFFICE O/P EST LOW 20 MIN: CPT | Performed by: FAMILY MEDICINE

## 2020-12-14 NOTE — PROGRESS NOTES
Chief Complaint   Patient presents with     Gyn Exam     Leti Grover is a 50 year old female who presents to clinic today for the following health issues:    History of Present Illness       She eats 2-3 servings of fruits and vegetables daily.She consumes 1 sweetened beverage(s) daily.She exercises with enough effort to increase her heart rate 20 to 29 minutes per day.  She exercises with enough effort to increase her heart rate 5 days per week.   She is taking medications regularly.     -Patient presents for a Pap only visit.  -A routine physical was performed 10/27/2020, but a  exam was not performed.  -Mammogram was within normal limits 12/7/2020.  -Patient is postmenopausal, with LMP a few years ago.  -No STD, bleeding, or vaginal concerns.    -Pap history: Last diagnostic Pap smear was within normal limits 7/8/2019, with negative HPV testing.  Patient has history of an ASCUS Pap 12/7/2017.  Patient is due for a follow up Pap with co-testing, per chart review.     -Patient denies additional concerns today.    Review of Systems   Respiratory: Negative for shortness of breath.    Cardiovascular: Negative for chest pain.       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       Social History     Tobacco Use     Smoking status: Never Smoker     Smokeless tobacco: Never Used   Substance Use Topics     Alcohol use: No     Alcohol/week: 0.0 standard drinks     Frequency: Never     Drinks per  session: Patient refused     Binge frequency: Never     Family History   Problem Relation Age of Onset     Cancer Father         skin cancer     Diabetes Other      C.A.D. No family hx of      Breast Cancer No family hx of      Cancer - colorectal No family hx of          Current Outpatient Medications   Medication Sig Dispense Refill     blood glucose (ACCU-CHEK MADDI PLUS) test strip TEST EVERY  strip 0     blood glucose monitoring (NO BRAND SPECIFIED) meter device kit Use to test blood sugar 2 times daily or as directed. 1 kit 0     blood glucose monitoring (SOFTCLIX) lancets TEST EVERY  each 0     fluocinonide (LIDEX) 0.05 % external ointment Twice daily to rash behind the ears until clear. 30 g 2     FLUoxetine (PROZAC) 20 MG capsule TAKE 2 CAPSULES(40 MG) BY MOUTH DAILY 180 capsule 3     loratadine (CLARITIN) 10 MG tablet TAKE 1 TABLET(10 MG) BY MOUTH DAILY 90 tablet 0     metFORMIN (GLUCOPHAGE-XR) 500 MG 24 hr tablet Take 2 tablets (1,000 mg) by mouth daily (with dinner) 360 tablet 3     multivitamin, therapeutic with minerals (MULTI-VITAMIN) TABS Take 1 tablet by mouth daily 100 tablet 3     order for DME Equipment being ordered: CPAP supplies. Length of Need: Lifetime 1 Package 99     oxybutynin ER (DITROPAN-XL) 10 MG 24 hr tablet Take 1 tablet (10 mg) by mouth daily 90 tablet 3     Plant Sterols and Stanols (CHOLEST OFF PO)        pravastatin (PRAVACHOL) 20 MG tablet Take 1 tablet (20 mg) by mouth daily 90 tablet 3     triamcinolone (KENALOG) 0.1 % external ointment To area behind ear twice daily until rash is clear. 30 g 1         No Known Allergies    OBJECTIVE  BP 98/72 (BP Location: Right arm, Patient Position: Sitting, Cuff Size: Adult Regular)   Pulse 80   Resp 18   Wt 67.1 kg (148 lb)   LMP 08/20/2014   SpO2 99%   Breastfeeding No   BMI 26.89 kg/m      Physical Exam    GENERAL: Healthy, alert and no distress.  A female chaperone was in the room throughout the duration of the  "patient's exam today, per patient request.  EYES: Wears glasses.  Eyes grossly normal to inspection  NECK: Spontaneous full range of motion.  RESP: lungs clear to auscultation - no rales, rhonchi or wheezes  BREAST: Discussed with and declined by patient.  CV: regular rates and rhythm, normal S1 S2, no S3 or S4 and no murmur, click or rub  ABDOMEN: soft, nontender, no hepatosplenomegaly, no masses and bowel sounds normal   (female): A female chaperone was in the room throughout the duration of the patient's exam today.  There is a 5-6 mm, flesh-colored skin tag on a short stalk noted involving the left perineum.  External genitalia otherwise within normal limits.  Normal urethral meatus.  Vaginal mucosa pink, moist, and well rugated.  Speculum exam was technically difficult (due to patient position and anterior cervix), with only the bottom half of the cervix visualized.  No discharge or bleeding.  Patient declined trying a different speculum or further exam, so a Pap smear was obtained from the visualized half of the cervix.  Patient did give consent for a bimanual exam.  Uterus was noted to be within normal limits for a postmenopausal female.  No cervical, uterine, or adnexal mass/tenderness noted.  MS: no gross musculoskeletal defects noted, no edema  SKIN: See \" exam\" above.   no suspicious lesions or rashes  NEURO: Normal strength and tone, mentation intact and speech normal  PSYCH: Mentation appears normal.  Provider performed appropriate handwashing, as witnessed by female chaperone.  Provider also double gloved for the patient's  exam.  Patient insisted that provider perform 20+ seconds of handwashing, which was performed pre and post  exam today.    Labs:  No results found for this or any previous visit (from the past 24 hour(s)).    ASSESSMENT:      ICD-10-CM    1. Encounter for gynecological examination without abnormal finding  Z01.419    2. Cervical cancer screening  Z12.4 HPV High Risk Types " DNA Cervical     Pap imaged thin layer screen with HPV - recommended age 30 - 65 years (select HPV order below)      PLAN:    -Patient will be notified of her Pap and HPV testing.  -May need to consider a follow up Pap and HPV testing in 1 year, only as appropriate.    The patient was discharged ambulatory and in stable condition post discussion of follow up.     Disclaimer: The above dictation was composed using a combination of keyboarding and voice recognition software.  As a result, there may be errors in the dictation that have gone undetected.  Please consider this when interpreting the information found in this chart.    Tania Ramos MD

## 2020-12-14 NOTE — NURSING NOTE
"While waiting in exam room for provider patient yelled \"hello\" down hallway. I spoke to patient, explained to patient that provider will be with her that she is finishing up with patient that was ahead of her. Patient was not upset and okay with that.    I also a chaperoned during pap smear with provider.     MANUELA REICH MA on 12/14/2020 at 11:57 AM    "

## 2020-12-15 ASSESSMENT — ENCOUNTER SYMPTOMS: SHORTNESS OF BREATH: 0

## 2020-12-16 LAB
COPATH REPORT: NORMAL
PAP: NORMAL

## 2021-03-18 DIAGNOSIS — E78.5 HYPERLIPIDEMIA LDL GOAL <160: ICD-10-CM

## 2021-03-18 DIAGNOSIS — E11.9 TYPE 2 DIABETES MELLITUS WITHOUT COMPLICATION, WITHOUT LONG-TERM CURRENT USE OF INSULIN (H): ICD-10-CM

## 2021-03-18 RX ORDER — PRAVASTATIN SODIUM 20 MG
20 TABLET ORAL DAILY
Qty: 90 TABLET | Refills: 0 | Status: SHIPPED | OUTPATIENT
Start: 2021-03-18 | End: 2021-06-16

## 2021-03-18 NOTE — TELEPHONE ENCOUNTER
Prescription approved per [facility] Refill Protocol.  Sabrina Soni RN, BSN  Message handled by CLINIC NURSE.

## 2021-06-10 ENCOUNTER — TELEPHONE (OUTPATIENT)
Dept: PEDIATRICS | Facility: CLINIC | Age: 51
End: 2021-06-10

## 2021-06-10 DIAGNOSIS — E11.9 TYPE 2 DIABETES MELLITUS WITHOUT COMPLICATION, WITHOUT LONG-TERM CURRENT USE OF INSULIN (H): ICD-10-CM

## 2021-06-11 RX ORDER — METFORMIN HCL 500 MG
1000 TABLET, EXTENDED RELEASE 24 HR ORAL
Qty: 360 TABLET | Refills: 0 | Status: SHIPPED | OUTPATIENT
Start: 2021-06-11 | End: 2021-07-09

## 2021-06-11 NOTE — TELEPHONE ENCOUNTER
Called and spoke to the patient's .  He stated that they moved to [place] and are establishing care down there.  He stated that he will make sure they establish with a new provider before the patient will need any further refills.     Routing to PCP as AMY.     Aubree Cruz    June 11, 2021 at 10:49 AM

## 2021-06-11 NOTE — TELEPHONE ENCOUNTER
Medication is being filled for 1 time refill only due to:  Patient needs to be seen because DM visit due.  Routed to TC for scheduling  Prescription approved per [facility] Refill Protocol.  Sabrina Soni RN, BSN  Message handled by CLINIC NURSE.

## 2021-06-15 DIAGNOSIS — E11.9 TYPE 2 DIABETES MELLITUS WITHOUT COMPLICATION, WITHOUT LONG-TERM CURRENT USE OF INSULIN (H): ICD-10-CM

## 2021-06-15 DIAGNOSIS — E78.5 HYPERLIPIDEMIA LDL GOAL <160: ICD-10-CM

## 2021-06-16 RX ORDER — PRAVASTATIN SODIUM 20 MG
20 TABLET ORAL DAILY
Qty: 90 TABLET | Refills: 0 | Status: SHIPPED | OUTPATIENT
Start: 2021-06-16

## 2021-06-16 NOTE — TELEPHONE ENCOUNTER
Prescription approved per [facility] Refill Protocol.  MARYELLEN BessN, RN  [facility]

## 2021-07-08 DIAGNOSIS — E11.9 TYPE 2 DIABETES MELLITUS WITHOUT COMPLICATION, WITHOUT LONG-TERM CURRENT USE OF INSULIN (H): ICD-10-CM

## 2021-07-09 RX ORDER — METFORMIN HCL 500 MG
1000 TABLET, EXTENDED RELEASE 24 HR ORAL
Qty: 180 TABLET | Refills: 0 | Status: SHIPPED | OUTPATIENT
Start: 2021-07-09

## 2021-07-09 NOTE — TELEPHONE ENCOUNTER
Patient moved to [place] and is establishing with a new care provider.     Aubree Cruz    July 9, 2021 at 1:36 PM

## 2021-07-09 NOTE — TELEPHONE ENCOUNTER
Routing refill request to provider for review/approval because:  Ирина given x1 and patient did not follow up, please advise  Labs not current:  Creatinine, A1c  Patient needs to be seen because:  Due for DM f/u    Pj MOYER RN

## 2021-07-09 NOTE — TELEPHONE ENCOUNTER
Overdue for q6 moth f/up.     Schedule DM f/up - extender okay.     LADY. Carroll Brambila MD  Internal Medicine-Pediatrics

## 2021-08-14 DIAGNOSIS — R73.03 PREDIABETES: ICD-10-CM

## 2021-08-17 RX ORDER — BLOOD SUGAR DIAGNOSTIC
STRIP MISCELLANEOUS
Qty: 100 STRIP | Refills: 0 | Status: SHIPPED | OUTPATIENT
Start: 2021-08-17

## 2021-08-17 NOTE — TELEPHONE ENCOUNTER
3 month josey refill approved.     MA/TC: Please assist patient in scheduling office visit.     Melania Jaeger RN on 8/17/2021 at 10:04 AM

## 2021-08-18 NOTE — TELEPHONE ENCOUNTER
Called patient and got her  - they moved so will be changing clinics.     Removed PCP.     Called pharmacy to cancel script, closing encounter.     Marilyn Georges CMA

## 2021-08-20 DIAGNOSIS — L30.9 CHRONIC DERMATITIS: ICD-10-CM

## 2021-08-20 DIAGNOSIS — R73.03 PREDIABETES: ICD-10-CM

## 2021-08-23 RX ORDER — LANCETS
EACH MISCELLANEOUS
Qty: 100 EACH | Refills: 1 | Status: SHIPPED | OUTPATIENT
Start: 2021-08-23

## 2021-08-23 NOTE — TELEPHONE ENCOUNTER
Medication is being filled for 1 time refill only due to:  Patient needs to be seen because due for follow up w/ PCP.  Routing to MA/TC pool. The Pt is due for a visit with PCP. Please call and help them schedule.    RN will issue a 90 day supply. No further refills until the Pt is seen.         Pj MOYER RN

## 2021-08-26 NOTE — TELEPHONE ENCOUNTER
Patient read Clarison[employer] message. No appointment made.   Called patient - no answer. Lvm to call the clinic to set up an appointment.     Sabina Jones MA

## 2021-09-18 ENCOUNTER — HEALTH MAINTENANCE LETTER (OUTPATIENT)
Age: 51
End: 2021-09-18

## 2021-10-19 PROBLEM — F32.9 MAJOR DEPRESSION: Status: ACTIVE | Noted: 2018-10-01

## 2021-11-13 ENCOUNTER — HEALTH MAINTENANCE LETTER (OUTPATIENT)
Age: 51
End: 2021-11-13

## 2021-12-01 NOTE — TELEPHONE ENCOUNTER
Insurance phone number: [phone]  Ins ID: [id number]      Patient is now using [facility] in [place] ([phone])  BIN:[id number]  PCN: [id number]  [id number]: [id number]    Submitted PA via cover my meds.     Elena Malone MA           [Follow-Up: _____] : a [unfilled] follow-up visit  [Patient] : patient [Parents] : parents [Medical Records] : medical records

## 2022-01-08 ENCOUNTER — HEALTH MAINTENANCE LETTER (OUTPATIENT)
Age: 52
End: 2022-01-08

## 2022-03-05 ENCOUNTER — HEALTH MAINTENANCE LETTER (OUTPATIENT)
Age: 52
End: 2022-03-05

## 2022-11-19 ENCOUNTER — HEALTH MAINTENANCE LETTER (OUTPATIENT)
Age: 52
End: 2022-11-19

## 2023-02-14 NOTE — TELEPHONE ENCOUNTER
Do you have your safety plan Yes      Have you had any opportunities to use your plan? Yes and but I have not needed it    Do you have your discharge instructions? No and OT who made call will alert  to mail out or have Sheri  AVS.     Have you had the opportunity to follow up with any of your after care appointments? Yes    Do you have transport to get to your outpatient appointments? Yes    Are there any other obstacles or barriers that you can think of that will get in the way of following through on your after care plan?  No    Are there any immediate concerns about the stability of your living situation? No    Do you have all your prescribed medications? Yes    Do you have any questions related to your medication? No    Are there any other concerns or questions that I can help with? No    Pt reports online AVS is empty, discharge was over the phone so she does not have a physical copy but would like to pick one up from the building as she does not live far. This communication have been relayed to the front dest and to pt's .     Sho Bruno, OTR   Not due for a refill.     FLUoxetine (PROZAC) 20 MG capsule was filled on 10/1/2018, qty 180 with 11 refills.

## 2023-04-09 ENCOUNTER — HEALTH MAINTENANCE LETTER (OUTPATIENT)
Age: 53
End: 2023-04-09

## 2024-01-28 ENCOUNTER — HEALTH MAINTENANCE LETTER (OUTPATIENT)
Age: 54
End: 2024-01-28

## 2024-06-16 ENCOUNTER — HEALTH MAINTENANCE LETTER (OUTPATIENT)
Age: 54
End: 2024-06-16